# Patient Record
Sex: FEMALE | Race: WHITE | Employment: UNEMPLOYED | URBAN - NONMETROPOLITAN AREA
[De-identification: names, ages, dates, MRNs, and addresses within clinical notes are randomized per-mention and may not be internally consistent; named-entity substitution may affect disease eponyms.]

---

## 2019-11-06 ENCOUNTER — OFFICE VISIT (OUTPATIENT)
Dept: NEUROSURGERY | Age: 59
End: 2019-11-06
Payer: COMMERCIAL

## 2019-11-06 VITALS
DIASTOLIC BLOOD PRESSURE: 76 MMHG | HEART RATE: 84 BPM | HEIGHT: 65 IN | BODY MASS INDEX: 33.15 KG/M2 | SYSTOLIC BLOOD PRESSURE: 134 MMHG | OXYGEN SATURATION: 98 % | WEIGHT: 199 LBS

## 2019-11-06 DIAGNOSIS — R25.1 TREMOR: ICD-10-CM

## 2019-11-06 DIAGNOSIS — R25.1 TREMOR: Primary | ICD-10-CM

## 2019-11-06 LAB
T4 FREE: 0.3 NG/DL (ref 0.9–1.7)
TSH SERPL DL<=0.05 MIU/L-ACNC: 22.56 UIU/ML (ref 0.27–4.2)

## 2019-11-06 PROCEDURE — 99204 OFFICE O/P NEW MOD 45 MIN: CPT | Performed by: NURSE PRACTITIONER

## 2019-11-06 RX ORDER — GEMFIBROZIL 600 MG/1
600 TABLET, FILM COATED ORAL
COMMUNITY
End: 2021-03-11

## 2019-11-06 RX ORDER — INSULIN GLARGINE 100 [IU]/ML
111 INJECTION, SOLUTION SUBCUTANEOUS DAILY
Refills: 1 | COMMUNITY
Start: 2019-09-23 | End: 2022-10-18

## 2019-11-06 RX ORDER — VITAMIN E 268 MG
800 CAPSULE ORAL DAILY
COMMUNITY
End: 2022-10-18

## 2019-11-06 RX ORDER — PEN NEEDLE, DIABETIC 32GX 5/32"
NEEDLE, DISPOSABLE MISCELLANEOUS
Refills: 5 | COMMUNITY
Start: 2019-08-19 | End: 2022-10-18

## 2019-11-06 SDOH — HEALTH STABILITY: MENTAL HEALTH: HOW OFTEN DO YOU HAVE A DRINK CONTAINING ALCOHOL?: NEVER

## 2019-11-07 ENCOUNTER — TELEPHONE (OUTPATIENT)
Dept: NEUROSURGERY | Age: 59
End: 2019-11-07

## 2019-11-15 ENCOUNTER — TELEPHONE (OUTPATIENT)
Dept: NEUROSURGERY | Age: 59
End: 2019-11-15

## 2019-12-18 ENCOUNTER — TELEPHONE (OUTPATIENT)
Dept: NEUROSURGERY | Age: 59
End: 2019-12-18

## 2020-01-15 ENCOUNTER — TELEPHONE (OUTPATIENT)
Dept: NEUROLOGY | Age: 60
End: 2020-01-15

## 2020-01-15 NOTE — TELEPHONE ENCOUNTER
Patient called and was wanting to know the results of her MRI that was done at [facility]. The radiology report is in the media tab in the chart.

## 2020-01-16 NOTE — TELEPHONE ENCOUNTER
Called patient and left VM. Advised patient to call the office if she has any additional questions. Provided my extension number so the patient can reach me directly.

## 2020-03-11 ENCOUNTER — OFFICE VISIT (OUTPATIENT)
Dept: NEUROSURGERY | Age: 60
End: 2020-03-11
Payer: COMMERCIAL

## 2020-03-11 VITALS
HEART RATE: 100 BPM | BODY MASS INDEX: 33.66 KG/M2 | OXYGEN SATURATION: 97 % | HEIGHT: 65 IN | WEIGHT: 202 LBS | SYSTOLIC BLOOD PRESSURE: 128 MMHG | DIASTOLIC BLOOD PRESSURE: 73 MMHG

## 2020-03-11 PROCEDURE — 99213 OFFICE O/P EST LOW 20 MIN: CPT | Performed by: NURSE PRACTITIONER

## 2020-03-11 RX ORDER — PROPRANOLOL HYDROCHLORIDE 40 MG/1
40 TABLET ORAL 2 TIMES DAILY
Qty: 60 TABLET | Refills: 5 | Status: SHIPPED | OUTPATIENT
Start: 2020-03-11 | End: 2020-06-10 | Stop reason: SDUPTHER

## 2020-03-11 RX ORDER — PRAVASTATIN SODIUM 80 MG/1
TABLET ORAL
COMMUNITY
Start: 2020-03-04 | End: 2020-12-11

## 2020-03-11 NOTE — PATIENT INSTRUCTIONS
Propranolol instructions:     Week 1: Take 1 tablet in the morning   Week 2: If no side effects (sleepiness, sluggish) then increase to 1 tablet twice daily.

## 2020-03-11 NOTE — PROGRESS NOTES
REVIEW OF SYSTEMS    Constitutional: []Fever []Sweat []Chills [] Recent Injury [x] Denies all unless marked  HEENT:[x]Headache  [] Head Injury/Hearing Loss  [] Sore Throat  [] Ear Ache/Dizziness  [] Denies all unless marked  Spine:  [] Neck pain  [] Back pain  [] Sciaticia  [x] Denies all unless marked  Cardiovascular:[]Heart Disease []Chest Pain [] Palpitations  [x] Denies all unless marked  Pulmonary: []Shortness of Breath []Cough   [x] Denies all unless marke  Gastrointestinal: []Nausea  []Vomiting  []Abdominal Pain  []Constipation  []Diarrhea  []Dark Bloody Stools  [x] Denies all unless marked  Psychiatric/Behavioral:[x] Depression [] Anxiety [] Denies all unless marked  Genitourinary:   [] Frequency  [] Urgency  [] Incontinence [] Pain with Urination  [x] Denies all unless marked  Extremities: []Pain  []Swelling  [x] Denies all unless marked  Musculoskeletal: [] Muscle Pain  [] Joint Pain  [] Arthritis [] Muscle Cramps [] Muscle Twitches  [x] Denies all unless marked  Sleep: [] Insomnia [] Snoring [x] Restless Legs [] Sleep Apnea  [] Daytime Sleepiness  [] Denies all unless marked  Skin:[] Rash [] Skin Discoloration [x] Denies all unless marked   Neurological: []Visual Disturbance/Memory Loss [] Loss of Balance [] Slurred Speech/Weakness [] Seizures  [] Vertigo/Dizziness [x] Denies all unless marked

## 2020-03-11 NOTE — PROGRESS NOTES
repetition intact. COMMENTS:    Cranial Nerves [X]No VF deficit to confrontation,  no papilledema on fundoscopic exam.  [X]PERRLA, EOMI, no nystagmus, conjugate eye movements, no ptosis  [X]Face symmetric  [X]Facial sensation intact  [X]Tongue midline no atrophy or fasciculations present  [X]Palate midline, hearing to finger rub normal bilaterally  [X]Shoulder shrug and SCM testing normal bilaterally  COMMENTS:    Motor   [X]5/5 strength x 4 extremities  [X]Normal bulk and tone  [X]No tremor present  [X]No rigidity or bradykinesia noted  COMMENTS: mild intention tremor, bilaterally L>R   Sensory  [X]Sensation intact to light touch, pin prick, vibration, and proprioception BLE  [X]Sensation intact to light touch, pin prick, vibration, and proprioception BUE  COMMENTS:   Coordination [X]FTN normal bilaterally   [X]HTS normal bilaterally  [X]ALISSA normal bilaterally. COMMENTS:   Reflexes  [X]Symmetric and non-pathological  [X]Toes down going bilaterally  [X]No clonus present  COMMENTS:   Gait                  [X]Normal steady gait    [  ]Ataxic    [  ]Spastic     [  ]Magnetic     [  ]Shuffling  COMMENTS:        LABS RECORD AND IMAGING REVIEW (As below and per HPI)    No results found for: Pearley Tonny  No results found for: WBC, HGB, HCT, MCV, PLT  No results found for: NA, K, CL, CO2, BUN, CREATININE, GLUCOSE, CALCIUM, PROT, LABALBU, BILITOT, ALKPHOS, AST, ALT, LABGLOM, GFRAA, AGRATIO, GLOB  No results found for: CHOL, TRIG, HDL, LDLCALC  Lab Results   Component Value Date    TSH 22.560 (H) 11/06/2019    T4FREE 0.3 (L) 11/06/2019     No results found for: CRP, SEDRATE     Reviewed referral records     MRI brain (1/2020)- normal     ASSESSMENT:    Ha Carver is a 64y.o. year old female here for follow up of tremor. MRI brain was normal, TSH was quite elevated, she has followed up with PCP and started on medication now.  Exam is most c/w essential tremor, has noted some worsening since last visit, starting to interfere with ADLs. Will plan to try Propranolol, has taken this previously but at quite a low dose. Advised to monitor blood sugar and possible masking of hypoglycemia while taking this medication, her blood sugar is not labile, does not have hypoglycemic episodes. ICD-10-CM    1. Tremor R25.1        PLAN:  1. Propranolol 40mg BID. Discussed side effects with patient. Discussed closely monitoring blood sugar with this medication   2. Continue follow up with PCP regarding thyroid function   3. Return in about 3 months (around 6/11/2020) for follow up, sooner if worsening. ROB Severino    Note:  A total of >50% (>8 minutes) of 15 minutes was spent discussing the pathophysiology and treatment and/or coordination of care of the above diagnoses. This dictation was generated by voice recognition computer software. Although all attempts are made to edit the dictation for accuracy, there may be errors in the transcription that are not intended.

## 2020-06-08 ENCOUNTER — TELEPHONE (OUTPATIENT)
Dept: NEUROSURGERY | Age: 60
End: 2020-06-08

## 2020-06-10 ENCOUNTER — VIRTUAL VISIT (OUTPATIENT)
Dept: NEUROSURGERY | Age: 60
End: 2020-06-10
Payer: COMMERCIAL

## 2020-06-10 PROCEDURE — 99213 OFFICE O/P EST LOW 20 MIN: CPT | Performed by: NURSE PRACTITIONER

## 2020-06-10 RX ORDER — PROPRANOLOL HYDROCHLORIDE 40 MG/1
80 TABLET ORAL 2 TIMES DAILY
Qty: 120 TABLET | Refills: 5
Start: 2020-06-10 | End: 2020-09-09

## 2020-09-09 ENCOUNTER — TELEPHONE (OUTPATIENT)
Dept: NEUROSURGERY | Age: 60
End: 2020-09-09

## 2020-09-09 ENCOUNTER — VIRTUAL VISIT (OUTPATIENT)
Dept: NEUROSURGERY | Age: 60
End: 2020-09-09
Payer: MEDICAID

## 2020-09-09 PROCEDURE — 99213 OFFICE O/P EST LOW 20 MIN: CPT | Performed by: NURSE PRACTITIONER

## 2020-09-09 RX ORDER — PRIMIDONE 50 MG/1
50 TABLET ORAL NIGHTLY
Qty: 60 TABLET | Refills: 5 | Status: SHIPPED | OUTPATIENT
Start: 2020-09-09 | End: 2020-12-11 | Stop reason: SDUPTHER

## 2020-09-09 NOTE — PROGRESS NOTES
unless marked  Cardiovascular:[]? Heart Disease []? Chest Pain []? Palpitations  [x]? Denies all unless marked  Pulmonary: []? Shortness of Breath []? Cough   [x]? Denies all unless marke  Gastrointestinal: []? Nausea  []? Vomiting  []? Abdominal Pain  []? Constipation  []? Diarrhea  []? Dark Bloody Stools  [x]? Denies all unless marked  Psychiatric/Behavioral:[]? Depression []? Anxiety [x]? Denies all unless marked  Genitourinary:   []? Frequency  []? Urgency  []? Incontinence []? Pain with Urination  [x]? Denies all unless marked  Extremities: []? Pain  []? Swelling  [x]? Denies all unless marked  Musculoskeletal: []? Muscle Pain  []? Joint Pain  []? Arthritis []? Muscle Cramps []? Muscle Twitches  [x]? Denies all unless marked  Sleep: []? Insomnia []? Snoring []? Restless Legs []? Sleep Apnea  []? Daytime Sleepiness  [x]? Denies all unless marked  Skin:[]? Rash []? Skin Discoloration [x]? Denies all unless marked   Neurological: []? Visual Disturbance/Memory Loss []? Loss of Balance []? Slurred Speech/Weakness []? Seizures  []? Vertigo/Dizziness [x]? Denies all unless marked    The MA has completed the ROS with the patient. I have reviewed it in its' entirety with the patient and agree with the documentation. Past Medical History:   Diagnosis Date    Hypercholesteremia     Tremor        History reviewed. No pertinent surgical history. History reviewed. No pertinent family history. Social History     Socioeconomic History    Marital status:       Spouse name: Not on file    Number of children: Not on file    Years of education: Not on file    Highest education level: Not on file   Occupational History    Not on file   Social Needs    Financial resource strain: Not on file    Food insecurity     Worry: Not on file     Inability: Not on file    Transportation needs     Medical: Not on file     Non-medical: Not on file   Tobacco Use    Smoking status: Never Smoker    Smokeless tobacco: Never Used Substance and Sexual Activity    Alcohol use: Never     Frequency: Never    Drug use: Never    Sexual activity: Not on file   Lifestyle    Physical activity     Days per week: Not on file     Minutes per session: Not on file    Stress: Not on file   Relationships    Social connections     Talks on phone: Not on file     Gets together: Not on file     Attends [employer] service: Not on file     Active member of club or organization: Not on file     Attends meetings of clubs or organizations: Not on file     Relationship status: Not on file    Intimate partner violence     Fear of current or ex partner: Not on file     Emotionally abused: Not on file     Physically abused: Not on file     Forced sexual activity: Not on file   Other Topics Concern    Not on file   Social History Narrative    Not on file       Current Outpatient Medications   Medication Sig Dispense Refill    primidone (MYSOLINE) 50 MG tablet Take 1 tablet by mouth nightly 60 tablet 5    pravastatin (PRAVACHOL) 80 MG tablet TAKE 1 TABLET BY MOUTH ONCE DAILY AT BEDTIME      BASAGLAR KWIKPEN 100 UNIT/ML injection pen Inject 111 Units into the skin daily   1    BD PEN NEEDLE ESPERANZA U/F 32G X 4 MM MISC USE AS DIRECTED  5    gemfibrozil (LOPID) 600 MG tablet Take 600 mg by mouth 2 times daily (before meals)      metFORMIN (GLUCOPHAGE) 1000 MG tablet Take 1,000 mg by mouth 2 times daily (with meals)      vitamin E 400 UNIT capsule Take 800 Units by mouth daily       No current facility-administered medications for this visit.         No Known Allergies    PHYSICAL EXAMINATION:    Constitutional -   General appearance: No acute distress   EYES -   Conjunctiva normal  ENT-    No scars, masses, or lesions over external nose or ears  Cardiovascular -   No clubbing, cyanosis, or edema   Pulmonary-   Good expansion, normal effort without use of accessory muscles  Musculoskeletal -   No significant wasting of muscles noted  Gait as below, see gait exam in the neurologic exam  No gross bony deformities  Skin -   No rash, erythema, or pallor  Psychiatric -   Mood, affect, and behavior appear normal    Memory as below see mental status examination in the neurologic exam    NEUROLOGICAL EXAM    Mental status   [x]Awake, alert, oriented   [x]Affect attention and concentration appear appropriate  [x]Recent and remote memory appears unremarkable  [x]Speech normal without dysarthria or aphasia, comprehension and repetition intact. COMMENTS:    Cranial Nerves [x] PER, EOMI, no nystagmus, conjugate eye movements, no ptosis  [x]Face symmetric  [x]Tongue midline   [x]Shoulder shrug normal bilaterally  COMMENTS:   Motor   [x]Antigravity x 4 extremities  [x]Normal bulk and tone  []No tremor present  COMMENTS: postural/intention tremor bilateral hands L>R       Coordination [x] HTS normal bilaterally   COMMENTS:       Gait                  [x]Normal steady gait    []Ataxic    []Spastic     []Magnetic     []Shuffling  COMMENTS:       [] OTHER:      Due to this being a TeleHealth encounter, evaluation of the following organ systems is limited: Vitals/Constitutional/EENT/Resp/CV/GI//MS/Neuro/Skin/Heme-Lymph-Imm. LABS RECORD AND IMAGING REVIEW (As below and per HPI)    No results found for: Eddie García  No results found for: WBC, HGB, HCT, MCV, PLT  No results found for: NA, K, CL, CO2, BUN, CREATININE, GLUCOSE, CALCIUM, PROT, LABALBU, BILITOT, ALKPHOS, AST, ALT, LABGLOM, GFRAA, AGRATIO, GLOB    Reviewed referral records      MRI brain (1/2020)- normal     ASSESSMENT:    Yoly Wiggins is a 64y.o. year old female evaluated via Telehealth encounter for follow up of tremors. MRI brain negative. Previous TSH was quite elevated but now on Synthroid and level has normalized per patient, PCP following this. Exam is unchanged, most consistent with essential tremor.  Has tried Propranolol without much improvement, further titration not beneficial. Will plan to switch to Primidone today. Noting more heel pain recently, question heel spur, she has an appt with her primary tomorrow regarding this. Diagnosis Orders   1. Tremor          PLAN:  1. Trial of Primidone 50mg nightly. Titrate further as needed. Discussed side effects with patient. 2. Continue follow up with PCP regarding thyroid function and right heel/hip pain   3. Follow up in 3 months, sooner with any worsening     An  electronic signature was used to authenticate this note. ROB Goodwin     Pursuant to the emergency declaration under the [facility] waiver authority and the [facility] and [employer] Act, this Virtual  Visit was conducted, with patient's consent, to reduce the patient's risk of exposure to COVID-19 and provide continuity of care for an established patient. Services were provided through a video synchronous discussion virtually to substitute for in-person clinic visit.

## 2020-09-09 NOTE — TELEPHONE ENCOUNTER
[place] to r/s appointment due to provider being out of office, patient came in office and scheduled new appointment

## 2020-12-11 ENCOUNTER — OFFICE VISIT (OUTPATIENT)
Dept: NEUROSURGERY | Age: 60
End: 2020-12-11
Payer: MEDICAID

## 2020-12-11 VITALS
OXYGEN SATURATION: 98 % | HEIGHT: 65 IN | DIASTOLIC BLOOD PRESSURE: 79 MMHG | SYSTOLIC BLOOD PRESSURE: 133 MMHG | WEIGHT: 197 LBS | HEART RATE: 88 BPM | TEMPERATURE: 98.9 F | BODY MASS INDEX: 32.82 KG/M2

## 2020-12-11 PROCEDURE — 99213 OFFICE O/P EST LOW 20 MIN: CPT | Performed by: NURSE PRACTITIONER

## 2020-12-11 RX ORDER — LEVOTHYROXINE SODIUM 137 UG/1
125 TABLET ORAL DAILY
COMMUNITY
Start: 2020-12-04 | End: 2021-03-11

## 2020-12-11 RX ORDER — PRIMIDONE 50 MG/1
100 TABLET ORAL NIGHTLY
Qty: 60 TABLET | Refills: 5 | Status: SHIPPED | OUTPATIENT
Start: 2020-12-11 | End: 2021-03-11 | Stop reason: SDUPTHER

## 2020-12-11 RX ORDER — HUMAN INSULIN 100 [IU]/ML
INJECTION, SOLUTION SUBCUTANEOUS
COMMUNITY
Start: 2020-12-10 | End: 2021-03-11

## 2020-12-11 NOTE — PROGRESS NOTES
[facility] Neurology Office Note      Patient:   Irais Driver  MR#:    [id number]  Account Number:                         YOB: 1960  Date of Evaluation:  12/11/2020  Time of Note:                          8:26 AM  Primary/Referring Physician:  ROB Fleming - [facility]   Consulting Physician:  ROB Mancilla    FOLLOW UP VISIT    Chief Complaint   Patient presents with    3 Month Follow-Up    Tremors     pt states tremors are about the same        HISTORY OF PRESENT ILLNESS    Irais Driver is a 64y.o. year old female here for follow up of bilateral hand tremor, L>R. Intention and postural component noted. Noting head titubation as well. No vocal changes. No clear resting tremor. Denies gait changes. Denies rigidity or bradykinesia. Tremor not interfering with ADLs. She has tried Propranolol but no improvement. No tremor inducing medication. No stroke history. There is a family history of Parkinson's. No clear family history of essential tremor. No other complaints. Past Medical History:   Diagnosis Date    Hypercholesteremia     Tremor      History reviewed. No pertinent surgical history. History reviewed. No pertinent family history. Social History     Socioeconomic History    Marital status:       Spouse name: Not on file    Number of children: Not on file    Years of education: Not on file    Highest education level: Not on file   Occupational History    Not on file   Social Needs    Financial resource strain: Not on file    Food insecurity     Worry: Not on file     Inability: Not on file    Transportation needs     Medical: Not on file     Non-medical: Not on file   Tobacco Use    Smoking status: Never Smoker    Smokeless tobacco: Never Used   Substance and Sexual Activity    Alcohol use: Never     Frequency: Never    Drug use: Never    Sexual activity: Not on file   Lifestyle    Physical activity     Days per week: Not on file     Minutes per session: Not on file    Stress: Not on file   Relationships    Social connections     Talks on phone: Not on file     Gets together: Not on file     Attends [employer]ist service: Not on file     Active member of club or organization: Not on file     Attends meetings of clubs or organizations: Not on file     Relationship status: Not on file    Intimate partner violence     Fear of current or ex partner: Not on file     Emotionally abused: Not on file     Physically abused: Not on file     Forced sexual activity: Not on file   Other Topics Concern    Not on file   Social History Narrative    Not on file       Current Outpatient Medications   Medication Sig Dispense Refill    levothyroxine (SYNTHROID) 137 MCG tablet Take 125 mcg by mouth daily      NOVOLIN R FLEXPEN 100 UNIT/ML SOPN       primidone (MYSOLINE) 50 MG tablet Take 2 tablets by mouth nightly 60 tablet 5    BASAGLAR KWIKPEN 100 UNIT/ML injection pen Inject 111 Units into the skin daily   1    BD PEN NEEDLE ESPERANZA U/F 32G X 4 MM MISC USE AS DIRECTED  5    gemfibrozil (LOPID) 600 MG tablet Take 600 mg by mouth 2 times daily (before meals)      vitamin E 400 UNIT capsule Take 800 Units by mouth daily       No current facility-administered medications for this visit. No Known Allergies    REVIEW OF SYSTEMS  Constitutional: []? Fever []? Sweats []? Chills []? Recent Injury [x]? Denies all unless marked  HEENT:[]? Headache  []? Head Injury []? Hearing Loss  []? Sore Throat  []? Ear Ache [x]? Denies all unless marked  Spine:  []? Neck pain  []? Back pain  []? Sciaticia  [x]? Denies all unless marked  Cardiovascular:[]? Heart Disease []? Palpitations []? Chest Pain   [x]? Denies all unless marked  Pulmonary: []? Shortness of Breath []? Cough   [x]? Denies all unless marked  Psychiatric/Behavioral:[]? Depression []? Anxiety [x]? Denies all unless marked  Gastrointestinal: []? Nausea  []? Vomiting  []? Abdominal Pain  []? Constipation  []? Diarrhea  [x]?  Denies all unless intact  [X]Tongue midline no atrophy or fasciculations present  [X]Palate midline, hearing to finger rub normal bilaterally  [X]Shoulder shrug and SCM testing normal bilaterally  COMMENTS:    Motor   [X]5/5 strength x 4 extremities  [X]Normal bulk and tone  [X]No tremor present  [X]No rigidity or bradykinesia noted  COMMENTS: mild intention tremor, bilaterally L>R   Sensory  [X]Sensation intact to light touch, pin prick, vibration, and proprioception BLE  [X]Sensation intact to light touch, pin prick, vibration, and proprioception BUE  COMMENTS:   Coordination [X]FTN normal bilaterally   [X]HTS normal bilaterally  [X]ALISSA normal bilaterally. COMMENTS:   Reflexes  [X]Symmetric and non-pathological  [X]Toes down going bilaterally  [X]No clonus present  COMMENTS:   Gait                  [X]Normal steady gait    [  ]Ataxic    [  ]Spastic     [  ]Magnetic     [  ]Shuffling  COMMENTS:        LABS RECORD AND IMAGING REVIEW (As below and per HPI)    No results found for: Elijosh Fritz  No results found for: WBC, HGB, HCT, MCV, PLT  No results found for: NA, K, CL, CO2, BUN, CREATININE, GLUCOSE, CALCIUM, PROT, LABALBU, BILITOT, ALKPHOS, AST, ALT, LABGLOM, GFRAA, AGRATIO, GLOB  No results found for: CHOL, TRIG, HDL, LDLCALC  Lab Results   Component Value Date    TSH 22.560 (H) 11/06/2019    T4FREE 0.3 (L) 11/06/2019     No results found for: CRP, SEDRATE     Reviewed referral records     MRI brain (1/2020)- normal     ASSESSMENT:    Cristian Gallagher is a 64y.o. year old female here for follow up of tremors. MRI brain negative. Previous thyroid testing abnormal but now on Synthroid, normalized per patient. Exam is unchanged, most c/w essential tremor. Has tried Propranolol without much improvement. Primidone has been beneficial, will titrate slightly further today. ICD-10-CM    1. Tremor  R25.1        PLAN:  1. Increase Primidone to 50mg BID. Titrate further as needed. Discussed side effects with patient.     2. Continue follow up with PCP regarding thyroid function   3. Return in about 6 months (around 6/11/2021) for follow up, sooner if worsening. Jarrett Carroll DNP, APRN    Note:  A total of >50% (>8 minutes) of 15 minutes was spent discussing the pathophysiology and treatment and/or coordination of care of the above diagnoses.

## 2021-03-11 ENCOUNTER — VIRTUAL VISIT (OUTPATIENT)
Dept: NEUROSURGERY | Age: 61
End: 2021-03-11
Payer: MEDICAID

## 2021-03-11 DIAGNOSIS — R25.1 TREMOR: Primary | ICD-10-CM

## 2021-03-11 PROCEDURE — 99213 OFFICE O/P EST LOW 20 MIN: CPT | Performed by: NURSE PRACTITIONER

## 2021-03-11 RX ORDER — PRIMIDONE 50 MG/1
TABLET ORAL
Qty: 90 TABLET | Refills: 5 | Status: SHIPPED | OUTPATIENT
Start: 2021-03-11 | End: 2021-06-11 | Stop reason: SDUPTHER

## 2021-03-11 NOTE — PROGRESS NOTES
[facility] Virtual Visit Note    3/11/2021    TELEHEALTH EVALUATION -- Audio/Visual (During GTYUF-42 public health emergency)      Patient:   Eloise Benjamin  MR#:    [id number]  Account Number:                         YOB: 1960  Date of Evaluation:  3/11/2021  Time of Note:                          9:32 AM  Primary/Referring Physician:  ROB Polo - NP   Consulting Physician:  ROB Momin     FOLLOW UP    Chief Complaint   Patient presents with    6 Month Follow-Up     c/o worsening tremors    Tremors    Medication Adjustment       HPI:    Eloise Benjamin (:  1960) has requested an audio/video evaluation for the following concern(s):    For sooner follow up. Has noted worsening tremor recently. Still noting bilateral hand tremor L>R. Intention and postural component noted. Noting head titubation as well. No vocal changes. No clear resting tremor. Denies gait changes. Denies rigidity or bradykinesia. Tremor is interfering with ADLs. She is on Primidone 100mg nightly. She has tried Propranolol but no improvement. No tremor inducing medications. No stroke history. There is a family history of Parkinson's. No clear family history of essential tremor. REVIEW OF SYSTEMS    Constitutional: []? Fever []? Sweats []? Chills []? Recent Injury [x]? Denies all unless marked  HEENT:[]? Headache  []? Head Injury []? Hearing Loss  []? Sore Throat  []? Ear Ache [x]? Denies all unless marked  Spine:  []? Neck pain  []? Back pain  []? Sciaticia  [x]? Denies all unless marked  Cardiovascular:[]? Heart Disease []? Palpitations []? Chest Pain   [x]? Denies all unless marked  Pulmonary: []? Shortness of Breath []? Cough   [x]? Denies all unless marked  Psychiatric/Behavioral:[]? Depression []? Anxiety [x]? Denies all unless marked  Gastrointestinal: []? Nausea  []? Vomiting  []? Abdominal Pain  []? Constipation  []? Diarrhea  [x]? Denies all unless marked  Genitourinary:   []? Frequency  []? Urgency  []? Dysuria []? Incontinence  [x]? Denies all unless marked  Extremities: []? Pain  []? Swelling  [x]? Denies all unless marked  Musculoskeletal: []? Myalgias  []? Joint Pain  []? Arthritis []? Muscle Cramps []? Muscle Twitches  [x]? Denies all unless marked  Sleep: []? Insomnia[]? Snoring []? Restless Legs  []? Sleep Apnea  []? Daytime Sleepiness  [x]? Denies all unless marked  Skin:[]? Rash []? Color Change [x]? Denies all unless marked   Neurological:[]? Visual Disturbance []? Memory Loss []? Loss of Balance []? Slurred Speech []? Weakness []? Seizures  []? Dizziness [x]? Denies all unless marked    Past Medical History:   Diagnosis Date    Hypercholesteremia     Tremor        History reviewed. No pertinent surgical history. History reviewed. No pertinent family history. Social History     Socioeconomic History    Marital status:       Spouse name: Not on file    Number of children: Not on file    Years of education: Not on file    Highest education level: Not on file   Occupational History    Not on file   Social Needs    Financial resource strain: Not on file    Food insecurity     Worry: Not on file     Inability: Not on file    Transportation needs     Medical: Not on file     Non-medical: Not on file   Tobacco Use    Smoking status: Never Smoker    Smokeless tobacco: Never Used   Substance and Sexual Activity    Alcohol use: Never     Frequency: Never    Drug use: Never    Sexual activity: Not on file   Lifestyle    Physical activity     Days per week: Not on file     Minutes per session: Not on file    Stress: Not on file   Relationships    Social connections     Talks on phone: Not on file     Gets together: Not on file     Attends Cheondoism service: Not on file     Active member of club or organization: Not on file     Attends meetings of clubs or organizations: Not on file     Relationship status: Not on file    Intimate partner violence     Fear of current or ex partner: Not on file     Emotionally abused: Not on file     Physically abused: Not on file     Forced sexual activity: Not on file   Other Topics Concern    Not on file   Social History Narrative    Not on file       Current Outpatient Medications   Medication Sig Dispense Refill    primidone (MYSOLINE) 50 MG tablet Take 2 tablets by mouth nightly 60 tablet 5    BASAGLAR KWIKPEN 100 UNIT/ML injection pen Inject 111 Units into the skin daily   1    BD PEN NEEDLE ESPERANZA U/F 32G X 4 MM MISC USE AS DIRECTED  5    vitamin E 400 UNIT capsule Take 800 Units by mouth daily       No current facility-administered medications for this visit. No Known Allergies    PHYSICAL EXAMINATION:    Constitutional    General appearance: No acute distress   EYES -   Conjunctiva normal  ENT-    No scars, masses, or lesions over external nose or ears  Cardiovascular -   No clubbing, cyanosis, or edema   Pulmonary-   Good expansion, normal effort without use of accessory muscles  Musculoskeletal    No significant wasting of muscles noted  Gait as below, see gait exam in the neurologic exam  No gross bony deformities  Skin    No rash, erythema, or pallor  Psychiatric    Mood, affect, and behavior appear normal    Memory as below see mental status examination in the neurologic exam    NEUROLOGICAL EXAM    Mental status   [x]Awake, alert, oriented   [x]Affect attention and concentration appear appropriate  [x]Recent and remote memory appears unremarkable  [x]Speech normal without dysarthria or aphasia, comprehension and repetition intact.    COMMENTS:    Cranial Nerves [x] PER, EOMI, no nystagmus, conjugate eye movements, no ptosis  [x]Face symmetric  [x]Tongue midline   [x]Shoulder shrug normal bilaterally  COMMENTS:   Motor   [x]Antigravity x 4 extremities  [x]Normal bulk and tone  [x]No tremor present  COMMENTS: mild intention tremor, bilaterally L>R       Coordination [x] HTS normal bilaterally   COMMENTS:       Gait Patient was located at there primary place of residence (home). Provider was located at [facility] in [facility], [address]. No one else was present during the video visit.

## 2021-06-11 ENCOUNTER — OFFICE VISIT (OUTPATIENT)
Dept: NEUROSURGERY | Age: 61
End: 2021-06-11
Payer: MEDICAID

## 2021-06-11 VITALS
WEIGHT: 188 LBS | HEART RATE: 69 BPM | DIASTOLIC BLOOD PRESSURE: 72 MMHG | BODY MASS INDEX: 29.51 KG/M2 | HEIGHT: 67 IN | SYSTOLIC BLOOD PRESSURE: 122 MMHG | OXYGEN SATURATION: 98 %

## 2021-06-11 DIAGNOSIS — G25.0 ESSENTIAL TREMOR: Primary | ICD-10-CM

## 2021-06-11 PROCEDURE — 99213 OFFICE O/P EST LOW 20 MIN: CPT | Performed by: NURSE PRACTITIONER

## 2021-06-11 RX ORDER — GEMFIBROZIL 600 MG/1
1 TABLET, FILM COATED ORAL 2 TIMES DAILY
COMMUNITY
Start: 2021-04-18

## 2021-06-11 RX ORDER — PRIMIDONE 50 MG/1
TABLET ORAL
Qty: 150 TABLET | Refills: 5 | Status: SHIPPED | OUTPATIENT
Start: 2021-06-11 | End: 2021-09-14 | Stop reason: SDUPTHER

## 2021-06-11 RX ORDER — LEVOTHYROXINE SODIUM 0.15 MG/1
1 TABLET ORAL DAILY
COMMUNITY
Start: 2021-04-18 | End: 2022-10-18

## 2021-06-11 NOTE — PROGRESS NOTES
Insecurity:     Worried About Running Out of Food in the Last Year:     [address] in the Last Year:    Transportation Needs:     Lack of Transportation (Medical):  Lack of Transportation (Non-Medical):    Physical Activity:     Days of Exercise per Week:     Minutes of Exercise per Session:    Stress:     Feeling of Stress :    Social Connections:     Frequency of Communication with Friends and Family:     Frequency of Social Gatherings with Friends and Family:     Attends Muslim Services:     Active Member of Clubs or Organizations:     Attends Club or Organization Meetings:     Marital Status:    Intimate Partner Violence:     Fear of Current or Ex-Partner:     Emotionally Abused:     Physically Abused:     Sexually Abused:        Current Outpatient Medications   Medication Sig Dispense Refill    levothyroxine (SYNTHROID) 150 MCG tablet Take 1 tablet by mouth daily      gemfibrozil (LOPID) 600 MG tablet Take 1 tablet by mouth 2 times daily      primidone (MYSOLINE) 50 MG tablet Take 2 tablets in the morning and 3 tablets at night. 150 tablet 5    BASAGLAR KWIKPEN 100 UNIT/ML injection pen Inject 111 Units into the skin daily   1    BD PEN NEEDLE ESPERANZA U/F 32G X 4 MM MISC USE AS DIRECTED  5    vitamin E 400 UNIT capsule Take 800 Units by mouth daily       No current facility-administered medications for this visit. No Known Allergies      REVIEW OF SYSTEMS  Constitutional: []? Fever []? Sweat []? Chills []? Recent Injury [x]? Denies all unless marked  HEENT:[]? Headache  []? Head Injury/Hearing Loss  []? Sore Throat  []? Ear Ache/Dizziness  [x]? Denies all unless marked  Spine:  []? Neck pain  []? Back pain  []? Sciaticia  [x]? Denies all unless marked  Cardiovascular:[]? Heart Disease []? Chest Pain []? Palpitations  [x]? Denies all unless marked  Pulmonary: []? Shortness of Breath []? Cough   [x]? Denies all unless marke  Gastrointestinal: []? Nausea  []? Vomiting  []? Abdominal Pain []?Constipation  []? Diarrhea  []? Dark Bloody Stools  [x]? Denies all unless marked  Psychiatric/Behavioral:[]? Depression []? Anxiety [x]? Denies all unless marked  Genitourinary:   []? Frequency  []? Urgency  []? Incontinence []? Pain with Urination  [x]? Denies all unless marked  Extremities: []? Pain  []? Swelling  [x]? Denies all unless marked  Musculoskeletal: []? Muscle Pain  []? Joint Pain  []? Arthritis []? Muscle Cramps []? Muscle Twitches  [x]? Denies all unless marked  Sleep: []? Insomnia []? Snoring []? Restless Legs []? Sleep Apnea  []? Daytime Sleepiness  [x]? Denies all unless marked  Skin:[]? Rash []? Skin Discoloration [x]? Denies all unless marked   Neurological: [x]? Visual Disturbance/Memory Loss []? Loss of Balance []? Slurred Speech/Weakness []? Seizures  [x]? Vertigo/Dizziness [x]? Denies all unless marked    The MA has completed the ROS with the patient. I have reviewed it in its' entirety with the patient and agree with the documentation. PHYSICAL EXAM  /72   Pulse 69   Ht 5' 7\" (1.702 m)   Wt 188 lb (85.3 kg)   SpO2 98%   BMI 29.44 kg/m²       Constitutional - No acute distress    HEENT- Conjunctiva normal.  No scars, masses, or lesions over external nose or ears, no neck masses noted, no jugular vein distension, no bruit  Cardiac- Regular rate and rhythm  Pulmonary- Good expansion, normal effort without use of accessory muscles  Musculoskeletal - No significant wasting of muscles noted, no bony deformities  Extremities - No clubbing, cyanosis or edema  Skin - Warm, dry, and intact. No rash, erythema, or pallor  Psychiatric - Mood, affect, and behavior appear normal      NEUROLOGICAL EXAM     Mental status   [x] Awake, alert, oriented   [x]Affect attention and concentration appear appropriate  [x]Recent and remote memory appears unremarkable  [x]Speech normal without dysarthria or aphasia, comprehension and repetition intact.    COMMENTS:    Cranial Nerves [x]No VF deficit to confrontation,  no papilledema on fundoscopic exam.  [x]PERRLA, EOMI, no nystagmus, conjugate eye movements, no ptosis  [x]Face symmetric  [x]Facial sensation intact  [x]Tongue midline no atrophy or fasciculations present  [x]Palate midline, hearing to finger rub normal bilaterally  [x]Shoulder shrug and SCM testing normal bilaterally  COMMENTS: head titubation; vocal tremor   Motor   [x]5/5 strength x 4 extremities  [x]Normal bulk and tone  []No tremor present  [x]No rigidity or bradykinesia noted  COMMENTS: bilateral postural tremor L>R   Sensory  [x]Sensation intact to light touch, pin prick, vibration, and proprioception BLE  [x]Sensation intact to light touch, pin prick, vibration, and proprioception BUE  COMMENTS:   Coordination [x]FTN normal bilaterally   [x]HTS normal bilaterally  [x]ALISSA normal bilaterally. COMMENTS:   Reflexes  [x]Symmetric and non-pathological  [x]Toes down going bilaterally  [x]No clonus present  COMMENTS:   Gait                  [x]Normal steady gait    []Ataxic    []Spastic     []Magnetic     []Shuffling  COMMENTS:       LABS RECORD AND IMAGING REVIEW (As below and per HPI)    No results found for: [id number]  No results found for: WBC, HGB, HCT, MCV, PLT  No results found for: NA, K, CL, CO2, BUN, CREATININE, GLUCOSE, CALCIUM, PROT, LABALBU, BILITOT, ALKPHOS, AST, ALT, LABGLOM, GFRAA, AGRATIO, GLOB  No results found for: CHOL, TRIG, HDL, LDLCALC  Lab Results   Component Value Date    TSH 22.560 (H) 11/06/2019    T4FREE 0.3 (L) 11/06/2019     No results found for: CRP, SEDRATE     MRI brain (1/2020)- normal     Reviewed referral records     ASSESSMENT:    Jaquan Mckenna is a 63y.o. year old female here for follow up of tremors. Exam is most consistent with essential tremor. Prior MRI brain negative. Prior thyroid testing abnormal but now on Synthroid. Given worsening of tremor will further titrate Primidone today. Has tried and failed Propranolol previously.  Could consider referral to movement clinic for additional treatment options as well. ICD-10-CM    1. Essential tremor  G25.0        PLAN:  1. Titrate Primidone to 100/150. Discussed side effects, titrate further as needed/tolerated   2. Avoid caffeine, manage stress/anxiety in the setting of essential tremor   3. Return in about 3 months (around 9/11/2021) for follow up, sooner if worsening.     Norm Cochran DNP, APRN

## 2021-06-11 NOTE — PROGRESS NOTES
REVIEW OF SYSTEMS    Constitutional: []Fever []Sweat []Chills [] Recent Injury [x] Denies all unless marked  HEENT:[]Headache  [] Head Injury/Hearing Loss  [] Sore Throat  [] Ear Ache/Dizziness  [x] Denies all unless marked  Spine:  [] Neck pain  [] Back pain  [] Sciaticia  [x] Denies all unless marked  Cardiovascular:[]Heart Disease []Chest Pain [] Palpitations  [x] Denies all unless marked  Pulmonary: []Shortness of Breath []Cough   [x] Denies all unless marke  Gastrointestinal: []Nausea  []Vomiting  []Abdominal Pain  []Constipation  []Diarrhea  []Dark Bloody Stools  [x] Denies all unless marked  Psychiatric/Behavioral:[] Depression [] Anxiety [x] Denies all unless marked  Genitourinary:   [] Frequency  [] Urgency  [] Incontinence [] Pain with Urination  [x] Denies all unless marked  Extremities: []Pain  []Swelling  [x] Denies all unless marked  Musculoskeletal: [] Muscle Pain  [] Joint Pain  [] Arthritis [] Muscle Cramps [] Muscle Twitches  [x] Denies all unless marked  Sleep: [] Insomnia [] Snoring [] Restless Legs [] Sleep Apnea  [] Daytime Sleepiness  [x] Denies all unless marked  Skin:[] Rash [] Skin Discoloration [x] Denies all unless marked   Neurological: [x]Visual Disturbance/Memory Loss [] Loss of Balance [] Slurred Speech/Weakness [] Seizures  [x] Vertigo/Dizziness [x] Denies all unless marked

## 2021-06-16 NOTE — PROGRESS NOTES
Twitches  [x]? Denies all unless marked  Sleep: []? Insomnia[]? Snoring []? Restless Legs  []? Sleep Apnea  []? Daytime Sleepiness  [x]? Denies all unless marked  Neurological:[]? Visual Disturbance/Memory Loss []? Loss of Balance []? Slurred Speech/Weakness []? Seizures  []? Vertigo/Dizziness [x]? Denies all unless marked    The MA has completed the ROS with the patient. I have reviewed it in its' entirety with the patient and agree with the documentation. Past Medical History:   Diagnosis Date    Hypercholesteremia     Tremor        History reviewed. No pertinent surgical history. History reviewed. No pertinent family history. Social History     Socioeconomic History    Marital status:       Spouse name: Not on file    Number of children: Not on file    Years of education: Not on file    Highest education level: Not on file   Occupational History    Not on file   Social Needs    Financial resource strain: Not on file    Food insecurity     Worry: Not on file     Inability: Not on file    Transportation needs     Medical: Not on file     Non-medical: Not on file   Tobacco Use    Smoking status: Never Smoker    Smokeless tobacco: Never Used   Substance and Sexual Activity    Alcohol use: Never     Frequency: Never    Drug use: Never    Sexual activity: Not on file   Lifestyle    Physical activity     Days per week: Not on file     Minutes per session: Not on file    Stress: Not on file   Relationships    Social connections     Talks on phone: Not on file     Gets together: Not on file     Attends Latter-day service: Not on file     Active member of club or organization: Not on file     Attends meetings of clubs or organizations: Not on file     Relationship status: Not on file    Intimate partner violence     Fear of current or ex partner: Not on file     Emotionally abused: Not on file     Physically abused: Not on file     Forced sexual activity: Not on file   Other Topics Concern    Not emergency declaration under the [facility] waiver authority and the [employer] Act, this Virtual  Visit was conducted, with patient's consent, to reduce the patient's risk of exposure to COVID-19 and provide continuity of care for an established patient. Services were provided through a video synchronous discussion virtually to substitute for in-person clinic visit. Family

## 2021-09-14 ENCOUNTER — TELEPHONE (OUTPATIENT)
Dept: NEUROSURGERY | Age: 61
End: 2021-09-14

## 2021-09-14 ENCOUNTER — VIRTUAL VISIT (OUTPATIENT)
Dept: NEUROSURGERY | Age: 61
End: 2021-09-14
Payer: MEDICAID

## 2021-09-14 DIAGNOSIS — G25.0 ESSENTIAL TREMOR: Primary | ICD-10-CM

## 2021-09-14 PROCEDURE — 99213 OFFICE O/P EST LOW 20 MIN: CPT | Performed by: NURSE PRACTITIONER

## 2021-09-14 RX ORDER — INSULIN LISPRO 100 [IU]/ML
INJECTION, SOLUTION INTRAVENOUS; SUBCUTANEOUS
COMMUNITY
Start: 2021-08-25

## 2021-09-14 RX ORDER — PRIMIDONE 50 MG/1
TABLET ORAL
Qty: 180 TABLET | Refills: 5 | Status: SHIPPED | OUTPATIENT
Start: 2021-09-14 | End: 2021-09-23 | Stop reason: SDUPTHER

## 2021-09-14 RX ORDER — FLUTICASONE PROPIONATE 50 MCG
SPRAY, SUSPENSION (ML) NASAL
COMMUNITY
Start: 2021-08-25 | End: 2022-10-18

## 2021-09-14 RX ORDER — AZELASTINE 1 MG/ML
SPRAY, METERED NASAL
COMMUNITY
Start: 2021-09-13 | End: 2022-10-18

## 2021-09-14 RX ORDER — CETIRIZINE HYDROCHLORIDE 10 MG/1
TABLET ORAL
COMMUNITY
Start: 2021-09-13 | End: 2022-10-18

## 2021-09-14 NOTE — PROGRESS NOTES
[facility] Neurology Virtual Visit Note    2021    TELEHEALTH EVALUATION -- Audio/Visual (During OOKST-28 public health emergency)      Patient:   Carol Montalvo  MR#:    [id number]  Account Number:                         YOB: 1960  Date of Evaluation:  2021  Time of Note:                          10:21 AM  Primary/Referring Physician:  ROB Osorio - NP   Consulting Physician:  Humphrey Amador DNP, APRN    FOLLOW UP    Chief Complaint   Patient presents with    3 Month Follow-Up     pt states tremor is getting worse. head is shaking much worse    Tremors    Discuss Medications     HPI:    Carol Montalvo (:  1960) has requested an audio/video evaluation for the following concern(s): For follow up of tremor. Has noted worsening tremor since last visit. Notes more head titubation. Feels like hand tremor has worsened as well, right hand more so than left. Worsens with intention/posture. Vocal tremor noted as well. Tremor is interfering with ADLs. On Primidone 100/150, no side effects. No clear resting tremor. Denies gait changes. Denies rigidity or bradykinesia. She has failed Propranolol previously. No tremor inducing medications. No stroke history. There is a family history of Parkinson's. No clear family history of essential tremor. REVIEW OF SYSTEMS     Constitutional: []? Fever []? Sweat []? Chills []? Recent Injury [x]? Denies all unless marked  HEENT:[]? Headache  []? Head Injury/Hearing Loss  []? Sore Throat  []? Ear Ache/Dizziness  [x]? Denies all unless marked  Spine:  []? Neck pain  []? Back pain  []? Sciaticia  [x]? Denies all unless marked  Cardiovascular:[]? Heart Disease []? Chest Pain []? Palpitations  [x]? Denies all unless marked  Pulmonary: []? Shortness of Breath []? Cough   [x]? Denies all unless marke  Gastrointestinal: []? Nausea  []? Vomiting  []? Abdominal Pain  []? Constipation  []? Diarrhea  []? Dark Bloody Stools  [x]?  Denies all unless marked  Psychiatric/Behavioral:[]? Depression []? Anxiety [x]? Denies all unless marked  Genitourinary:   []? Frequency  []? Urgency  []? Incontinence []? Pain with Urination  [x]? Denies all unless marked  Extremities: []? Pain  []? Swelling  [x]? Denies all unless marked  Musculoskeletal: []? Muscle Pain  []? Joint Pain  []? Arthritis []? Muscle Cramps []? Muscle Twitches  [x]? Denies all unless marked  Sleep: []? Insomnia []? Snoring []? Restless Legs []? Sleep Apnea  []? Daytime Sleepiness  [x]? Denies all unless marked  Skin:[]? Rash []? Skin Discoloration [x]? Denies all unless marked   Neurological: [x]? Visual Disturbance/Memory Loss []? Loss of Balance []? Slurred Speech/Weakness []? Seizures  [x]? Vertigo/Dizziness [x]? Denies all unless marked    The MA has completed the ROS with the patient. I have reviewed it in its' entirety with the patient and agree with the documentation. Past Medical History:   Diagnosis Date    Acquired cataract     Hypercholesteremia     Tremor        No past surgical history on file. No family history on file. Social History     Socioeconomic History    Marital status:      Spouse name: Not on file    Number of children: Not on file    Years of education: Not on file    Highest education level: Not on file   Occupational History    Not on file   Tobacco Use    Smoking status: Never Smoker    Smokeless tobacco: Never Used   Vaping Use    Vaping Use: Never used   Substance and Sexual Activity    Alcohol use: Never    Drug use: Never    Sexual activity: Not Currently   Other Topics Concern    Not on file   Social History Narrative    Not on file     Social Determinants of Health     Financial Resource Strain:     Difficulty of Paying Living Expenses:    Food Insecurity:     Worried About Running Out of Food in the Last Year:     [address] in the Last Year:    Transportation Needs:     Lack of Transportation (Medical):      Lack of Transportation (Non-Medical):    Physical Activity:     Days of Exercise per Week:     Minutes of Exercise per Session:    Stress:     Feeling of Stress :    Social Connections:     Frequency of Communication with Friends and Family:     Frequency of Social Gatherings with Friends and Family:     Attends [employer] Services:     Active Member of Clubs or Organizations:     Attends Club or Organization Meetings:     Marital Status:    Intimate Partner Violence:     Fear of Current or Ex-Partner:     Emotionally Abused:     Physically Abused:     Sexually Abused:        Current Outpatient Medications   Medication Sig Dispense Refill    fluticasone (FLONASE) 50 MCG/ACT nasal spray instill ONE SPRAY into EACH nostril TWICE DAILY      HUMALOG KWIKPEN 100 UNIT/ML SOPN INJECT 20 UNITS SUBCUTANEOUSLY THREE TIMES DAILY WITH MEALS      azelastine (ASTELIN) 0.1 % nasal spray       cetirizine (ZYRTEC) 10 MG tablet       primidone (MYSOLINE) 50 MG tablet Take 3 tablets in the morning and 4 tablets at night. 180 tablet 5    levothyroxine (SYNTHROID) 150 MCG tablet Take 1 tablet by mouth daily      gemfibrozil (LOPID) 600 MG tablet Take 1 tablet by mouth 2 times daily      BASAGLAR KWIKPEN 100 UNIT/ML injection pen Inject 111 Units into the skin daily   1    BD PEN NEEDLE ESPERANZA U/F 32G X 4 MM MISC USE AS DIRECTED  5    vitamin E 400 UNIT capsule Take 800 Units by mouth daily       No current facility-administered medications for this visit.        No Known Allergies    PHYSICAL EXAMINATION:    Constitutional    General appearance: No acute distress   EYES -   Conjunctiva normal  ENT-    No scars, masses, or lesions over external nose or ears  Cardiovascular -   No clubbing, cyanosis, or edema   Pulmonary-   Good expansion, normal effort without use of accessory muscles  Musculoskeletal    No significant wasting of muscles noted  Gait as below, see gait exam in the neurologic exam  No gross bony deformities  Skin    No rash, erythema, or pallor  Psychiatric    Mood, affect, and behavior appear normal    Memory as below see mental status examination in the neurologic exam    NEUROLOGICAL EXAM    Mental status   [x]Awake, alert, oriented   [x]Affect attention and concentration appear appropriate  [x]Recent and remote memory appears unremarkable  [x]Speech normal without dysarthria or aphasia, comprehension and repetition intact. COMMENTS:     Cranial Nerves [x] PER, EOMI, no nystagmus, conjugate eye movements, no ptosis  [x]Face symmetric  [x]Tongue midline   [x]Shoulder shrug normal bilaterally  COMMENTS:    Motor   [x]Antigravity x 4 extremities  [x]Normal bulk and tone  []No tremor present  COMMENTS: postural tremor bilateral hands; head titubation        Coordination [x] HTS normal bilaterally   COMMENTS:       Gait                  [x]Normal steady gait    []Ataxic    []Spastic     []Magnetic     []Shuffling  COMMENTS:       [] OTHER:      Due to this being a TeleHealth encounter, evaluation of the following organ systems is limited: Vitals/Constitutional/EENT/Resp/CV/GI//MS/Neuro/Skin/Heme-Lymph-Imm. LABS RECORD AND IMAGING REVIEW (As below and per HPI)    No results found for: Kev Soto  No results found for: WBC, HGB, HCT, MCV, PLT  No results found for: NA, K, CL, CO2, BUN, CREATININE, GLUCOSE, CALCIUM, PROT, LABALBU, BILITOT, ALKPHOS, AST, ALT, LABGLOM, GFRAA, AGRATIO, GLOB    ASSESSMENT:    Anum Herrera is a 63y.o. year old female evaluated via Telehealth encounter for for follow up of tremors. Exam is consistent with essential tremor. Prior MRI brain negative. Prior thyroid testing abnormal but now on Synthroid. Progression of tremor noted, more head titubation noted today. Will increase Primidone further, has failed Propranolol. Could consider referral to movement clinic for additional treatment options as well. Diagnosis Orders   1.  Essential tremor        PLAN:  1. Titrate Primidone 150/200. Discussed side effects, titrate further as needed/tolerated   2. Avoid caffeine, manage stress/anxiety in the setting of essential tremor   3. Follow up in 3 months, sooner with any worsening     An  electronic signature was used to authenticate this note. ROB Roth DNP      Pursuant to the emergency declaration under the [address] [place] [address] [facility] authority and the [employer] Act, this Virtual  Visit was conducted, with patient's consent, to reduce the patient's risk of exposure to COVID-19 and provide continuity of care for an established patient. Services were provided through a video synchronous discussion virtually to substitute for in-person clinic visit. Patient was located at there primary place of residence (home). Provider was located at [facility] KIRSTEN SEE in [address] No one else was present during the video visit.

## 2021-09-14 NOTE — TELEPHONE ENCOUNTER
Called patient to start vv. No answer advised her she would need to call me back to continue visit.      Pt called bck at [phone]

## 2021-09-23 DIAGNOSIS — G25.0 ESSENTIAL TREMOR: Primary | ICD-10-CM

## 2021-09-23 DIAGNOSIS — R25.1 TREMOR: ICD-10-CM

## 2021-09-23 RX ORDER — PRIMIDONE 50 MG/1
TABLET ORAL
Qty: 360 TABLET | Refills: 5 | Status: SHIPPED | OUTPATIENT
Start: 2021-09-23 | End: 2022-04-29 | Stop reason: SDUPTHER

## 2021-09-29 ENCOUNTER — TELEPHONE (OUTPATIENT)
Dept: NEUROLOGY | Age: 61
End: 2021-09-29

## 2021-09-29 NOTE — TELEPHONE ENCOUNTER
Called patient no answer left voicemail with call back number . [phone]: pt called back explained she is only taking 2 in the am and 3 in pm. She was unable to tolerate high doses, she does note that this isnt helping with the tremor at this time.

## 2022-03-30 ENCOUNTER — TELEPHONE (OUTPATIENT)
Dept: NEUROSURGERY | Age: 62
End: 2022-03-30

## 2022-03-30 NOTE — TELEPHONE ENCOUNTER
Pt came to the office  thought she had appointment today . I explained she had cancelled her appointment with pre service on 2/24 . I rescheduled the patient for next available which was may . Patient was upset at me .

## 2022-04-29 DIAGNOSIS — R25.1 TREMOR: ICD-10-CM

## 2022-04-29 DIAGNOSIS — G25.0 ESSENTIAL TREMOR: ICD-10-CM

## 2022-04-29 RX ORDER — PRIMIDONE 50 MG/1
TABLET ORAL
Qty: 360 TABLET | Refills: 5 | Status: SHIPPED | OUTPATIENT
Start: 2022-04-29 | End: 2022-10-15 | Stop reason: SDUPTHER

## 2022-04-29 NOTE — TELEPHONE ENCOUNTER
Darrine All called stating that their primidone medication needs to go to [facility] Please call her to advise. Pharmacy phone: [phone]. Pt requesting refill today.     Last Appt:  9/14/2021  Next Appt:   9/1/2022  Preferred Pharmacy: [facility]

## 2022-04-29 NOTE — TELEPHONE ENCOUNTER
Requested Prescriptions     Pending Prescriptions Disp Refills    primidone (MYSOLINE) 50 MG tablet 360 tablet 5     Sig: Take 3 tablets in the morning and 4 tablets at night.        Last Office Visit: 9/14/2021  Next Office Visit: Visit date not found  Last Medication Refill: 9/23/2021 with 5 refills

## 2022-09-07 ENCOUNTER — TELEPHONE (OUTPATIENT)
Dept: NEUROSURGERY | Age: 62
End: 2022-09-07

## 2022-09-07 NOTE — TELEPHONE ENCOUNTER
Ana Desai  daughter  Reyes Olivo requests that nurse  return their call. The best time to reach her is Anytime. Patient daughter called patient shaking really bad cause her to throwing up, please called [phone]    Thank you.

## 2022-10-14 DIAGNOSIS — R25.1 TREMOR: ICD-10-CM

## 2022-10-14 DIAGNOSIS — G25.0 ESSENTIAL TREMOR: ICD-10-CM

## 2022-10-14 NOTE — TELEPHONE ENCOUNTER
Requested Prescriptions     Pending Prescriptions Disp Refills    primidone (MYSOLINE) 50 MG tablet 360 tablet 5     Sig: Take 3 tablets in the morning and 4 tablets at night.        Last Office Visit: 9/14/2021   Next Office Visit: Visit date not found  Last Medication Refill: 4/29/2022 with 5 RF       Horace stephens patient     Patient needs appointment

## 2022-10-15 RX ORDER — PRIMIDONE 50 MG/1
TABLET ORAL
Qty: 360 TABLET | Refills: 5 | Status: SHIPPED | OUTPATIENT
Start: 2022-10-15 | End: 2022-10-18 | Stop reason: SDUPTHER

## 2022-10-18 ENCOUNTER — OFFICE VISIT (OUTPATIENT)
Dept: CARDIOLOGY CLINIC | Age: 62
End: 2022-10-18

## 2022-10-18 VITALS
HEART RATE: 84 BPM | DIASTOLIC BLOOD PRESSURE: 78 MMHG | BODY MASS INDEX: 31.49 KG/M2 | SYSTOLIC BLOOD PRESSURE: 128 MMHG | WEIGHT: 189 LBS | HEIGHT: 65 IN

## 2022-10-18 DIAGNOSIS — E78.00 HYPERCHOLESTEREMIA: ICD-10-CM

## 2022-10-18 DIAGNOSIS — R25.1 TREMOR: ICD-10-CM

## 2022-10-18 DIAGNOSIS — R94.31 ABNORMAL ECG: ICD-10-CM

## 2022-10-18 DIAGNOSIS — E11.9 TYPE 2 DIABETES MELLITUS WITHOUT COMPLICATION, WITH LONG-TERM CURRENT USE OF INSULIN (HCC): ICD-10-CM

## 2022-10-18 DIAGNOSIS — R06.00 DYSPNEA, UNSPECIFIED TYPE: Primary | ICD-10-CM

## 2022-10-18 DIAGNOSIS — G25.0 ESSENTIAL TREMOR: ICD-10-CM

## 2022-10-18 DIAGNOSIS — Z82.49 FAMILY HISTORY OF EARLY CAD: ICD-10-CM

## 2022-10-18 DIAGNOSIS — Z79.4 TYPE 2 DIABETES MELLITUS WITHOUT COMPLICATION, WITH LONG-TERM CURRENT USE OF INSULIN (HCC): ICD-10-CM

## 2022-10-18 PROCEDURE — 93000 ELECTROCARDIOGRAM COMPLETE: CPT | Performed by: CLINICAL NURSE SPECIALIST

## 2022-10-18 PROCEDURE — 99203 OFFICE O/P NEW LOW 30 MIN: CPT | Performed by: CLINICAL NURSE SPECIALIST

## 2022-10-18 RX ORDER — LEVOTHYROXINE SODIUM 137 UG/1
137 TABLET ORAL DAILY
COMMUNITY

## 2022-10-18 RX ORDER — PRIMIDONE 50 MG/1
TABLET ORAL
Qty: 360 TABLET | Refills: 5 | Status: SHIPPED | OUTPATIENT
Start: 2022-10-18

## 2022-10-18 RX ORDER — INSULIN GLARGINE 100 [IU]/ML
53 INJECTION, SOLUTION SUBCUTANEOUS EVERY MORNING
COMMUNITY

## 2022-10-18 NOTE — PATIENT INSTRUCTIONS
ECHO and dobutamine stress echo son   Follow up after testing   Call with any questions or concerns  Follow up with PCP for non cardiac problems  Report any new problems  Cardiovascular Fitness-Exercise as tolerated. Strive for 30 minutes of exercise most days of the week. Cardiac / Healthy Diet  Continue current medications as directed  Continue plan of treatment  [place] at the [facility] and [address] [facility] located on the first floor of [place] [address] through [facility] main entrance and turn immediately to your left. Patient's contact number:  [phone] (home)     Date/Time:Oct 24th @9:45    Dobutamine Stress Test    A chemical stress test uses chemical agents injected into the body through the vein. These chemicals make the heart function as if it were under stress. A chemical stress test is used when a traditional stress test (called a cardiac stress test) cannot be done. Testing will take approximately one hour. Dobutamine Stress Echocardiogram Instructions:   No caffeine 12 hours prior to the testing. This includes: coffee, pop/soda, chocolate, cold medications, etc.  Any product that might contain caffeine. Do not eat or drink anything, except water, eight (8) hours before the test.   No alcohol or nicotine twelve (12) hours prior to your test.   Wear comfortable clothing. If you are taking metoprolol, stop morning of this procedure. If you need to change this appointment, please call outpatient scheduling at [phone]. [place] at the [facility] and [address] [facility] located on the first floor of [place] [address] through [facility] main entrance and turn immediately to your left. Date/Time:     Patient's contact number:  [phone] (home)     Echocardiogram -  No prep. Takes approximately 30 min. An echocardiogram uses sound waves to produce images of your heart.  This commonly used test allows your doctor to see how your heart is beating and pumping blood. Your doctor can use the images from an echocardiogram to identify various abnormalities in the heart muscle and valves. This test has 2 parts: You will be asked to disrobe from the waist up and given a gown to wear. The technologist will then hook up an EKG monitor to you for the entire exam.   You will then have an ultrasound of your heart (echocardiogram) to assess the heart muscle, heart valves and heart function. You may eat and take any medicines before the exam.     If you need to change your appointment, please call outpatient scheduling at [phone].

## 2022-10-18 NOTE — TELEPHONE ENCOUNTER
Requested Prescriptions     Pending Prescriptions Disp Refills    primidone (MYSOLINE) 50 MG tablet 360 tablet 5     Sig: Take 3 tablets in the morning and 4 tablets at night. Pt last refills were sent to wrong pharmacy. She now uses [facility] because they price match and her RXs are cheaper here.

## 2022-10-18 NOTE — PROGRESS NOTES
[facility], [address], [address] [facility]  Phone: [phone]  Fax: [phone]    OFFICE VISIT:  10/18/2022    Dasha Barbosa - : 1960    Dear ROB Brown,     I appreciate the opportunity of participating in the care of Dasha Barbosa. She is a very pleasant 63 y.o. female who I had the opportunity of seeing in my office today, 10/18/22. Records from your office have been obtained and reviewed. Reason For Visit:  Gaston Alexander is a 63 y.o. female who is here for New Patient (Jaw pain and soa ) and Shortness of Breath  History of diabetes, hypertension hyperlipidemia with worsening activity tolerance with shortness of breath and jaw discomfort. Patient reports she has been retired and usually is pretty active. She has noticed progressively worsening PARDO since January. She states now that is minor activities even doing things around the house will get short of breath. Will resolve with resting up to 15 minutes. Over the past couple weeks she is also had some exertional jaw pain. It will resolve with rest as well after 15 minutes. Denies any chest pain    She is never been a smoker. She reports being diabetic for 24 years. She developed gestational diabetes and then a year later became type II diabetic. She just recently got a CGM but does not have it hooked up yet    Subjective  Gaston Alexander has no exertional chest pain, pressure, burning or squeezing. Denies resting shortness of breath She is able to lie flat without evidence of orthopnea or paroxysmal nocturnal dyspnea. No symptomatic tachy- or jacki-arrhythmia. No numbness or weakness to suggest cerebrovascular accident or transient ischemic attack. Reports no  edema.      Dasha Barbosa has the following history as recorded in [facility]:  Patient Active Problem List    Diagnosis Date Noted    Hypercholesteremia 10/18/2022     Past Medical History:   Diagnosis Date    Acquired cataract Diabetes ([facility] [address]     Hypercholesteremia     Tremor      No past surgical history on file. Family History   Problem Relation Age of Onset    Heart Disease Father     Heart Disease Sister      Social History     Tobacco Use    Smoking status: Never    Smokeless tobacco: Never   Substance Use Topics    Alcohol use: Never        Allergies: Patient has no known allergies. Current Outpatient Medications   Medication Sig Dispense Refill    levothyroxine (SYNTHROID) 137 MCG tablet Take 137 mcg by mouth Daily      insulin glargine (LANTUS) 100 UNIT/ML injection vial Inject into the skin nightly      primidone (MYSOLINE) 50 MG tablet Take 3 tablets in the morning and 4 tablets at night. 360 tablet 5    HUMALOG KWIKPEN 100 UNIT/ML SOPN INJECT 20 UNITS SUBCUTANEOUSLY THREE TIMES DAILY WITH MEALS      gemfibrozil (LOPID) 600 MG tablet Take 1 tablet by mouth 2 times daily       No current facility-administered medications for this visit. Review of Systems  Constitutional - no significant activity change, appetite change, or unexpected weight change. No fever, chills or diaphoresis. No fatigue. HEENT - no significant rhinorrhea or epistaxis. No tinnitus or significant hearing loss. Eyes - no sudden vision change or amaurosis. Respiratory - no significant wheezing, stridor, apnea or cough. + dyspnea on exertion no resting shortness of breath. Cardiovascular - no exertional chest pain, + exertional jaw pain. No orthopnea or PND. No sensation of arrhythmia or slow heart rate. No claudication or leg edema. Gastrointestinal - no abdominal swelling or pain. No blood in stool. No severe constipation, diarrhea, nausea, or vomiting. Genitourinary - no difficulty urinating, dysuria, frequency, or urgency. No flank pain or hematuria. No  previous radiation or chemotherapy  Musculoskeletal - no back pain, gait disturbance, or myalgia. Skin - no color change or rash. No pallor.   No new surgical incision. Neurologic - no speech difficulty, facial asymmetry or lateralizing weakness. No seizures, presyncope, syncope, or significant dizziness. Hematologic - no easy bruising or excessive bleeding. Psychiatric - no severe anxiety or insomnia. No confusion. All other review of systems are negative. Objective  Vital Signs - /78   Pulse 84   Ht 5' 5\" (1.651 m)   Wt 189 lb (85.7 kg)   BMI 31.45 kg/m²   General - Sarahy Smart is alert, cooperative, and pleasant. Well groomed. No acute distress. Body habitus is overweight. HEENT - The head is normocephalic. No circumoral cyanosis. Dentition is normal.   Ears and nose externally normal. No abnormal scars or lesions noted  EYES -  No Xanthelasma, no arcus senilis, no conjunctival hemorrhages or discharge. Neck - Supple, without increased jugular venous pressures. No carotid bruits. No mass. Respiratory - Lungs are clear bilaterally. No wheezes or rales. Normal effort without use of accessory muscles. No tactile fremitus on palpation  Cardiovascular - Heart has regular rhythm and rate. No murmurs, rubs or gallops. + pedal pulses and no varicosities. Abdominal -  Soft, nontender, nondistended. Bowel sounds are intact. Extremities - No clubbing, cyanosis, or  edema. Musculoskeletal - No musculoskeletal symptoms. No clubbing . No Osler's nodes. Gait normal .  No kyphosis or scoliosis. Skin -  no statis ulcers or dermatitis. Neurological - No focal signs are identified. Oriented to person, place and time. Psychiatric -  Appropriate affect and mood. Assessment:          Diagnosis Orders   1. Dyspnea, unspecified type  EKG 12 lead    ECHO Complete 2D W Doppler W Color    ECHO Pharmacological Stress Test      2. Hypercholesteremia  ECHO Complete 2D W Doppler W Color    ECHO Pharmacological Stress Test      3. Abnormal ECG  ECHO Complete 2D W Doppler W Color    ECHO Pharmacological Stress Test      4.  Type 2 diabetes mellitus without complication, with long-term current use of insulin (HCC)  ECHO Complete 2D W Doppler W Color    ECHO Pharmacological Stress Test      5. Family history of early CAD        EKG today shows sinus rhythm with a rate of 84. Extensive anterolateral infarct. No previous EKG to compare    Reviewed PCP notes as well as labs. Last lipid panel from March shows total cholesterol 206, triglycerides 207, HDL 36 and -she is treated with Lopid. No recent labs    Hemoglobin A1c improved from 9.9 to 7.9 over the last 6 months    Patient has concerning symptoms for coronary disease with abnormal EKG, progressively worsening PARDO and now exertional jaw pain, diabetes and hyperlipidemia  Patient reports that her father had 4 heart attacks prior to arresting with heart catheterization. Her sister also had a heart attack in her 35s    We will get her set up for 2D echo and dobutamine stress echo as she does not feel she could walk on a treadmill due to her shortness of breath    We discussed if this is positive suggestive of myocardial ischemia we would set her up for heart catheterization. We briefly discussed that procedure. Patient currently has no insurance. She gets widows benefits and is unemployed. She is looking at insurance options now. Plan    ECHO and dobutamine stress echo soon   Continue working on good blood pressure control, cholesterol control and diabetes control  Follow up after testing   Call with any questions or concerns  Follow up with PCP for non cardiac problems  Report any new problems  Cardiovascular Fitness-Exercise as tolerated. Strive for 30 minutes of exercise most days of the week. Cardiac / Healthy Diet  Continue current medications as directed  Continue plan of treatment        I appreciate the opportunity of participating in the care and treatment of this patient.      ROB Acosta dragon/transcription disclaimer: Much of this encounter note is electronic transcription/translation of spoken language to printed tach. Electronic translation of spoken language may be erroneous, or at times, nonsensical words or phrases may be inadvertently transcribed.  Although, I have reviewed the note for such errors, some may still exist.      Cc:  ROB Christy - NP

## 2022-10-20 ENCOUNTER — APPOINTMENT (OUTPATIENT)
Dept: GENERAL RADIOLOGY | Age: 62
DRG: 234 | End: 2022-10-20

## 2022-10-20 ENCOUNTER — HOSPITAL ENCOUNTER (INPATIENT)
Age: 62
LOS: 4 days | Discharge: HOME | DRG: 234 | End: 2022-10-26
Attending: EMERGENCY MEDICINE | Admitting: SURGERY

## 2022-10-20 DIAGNOSIS — I51.9 HEART DISEASE: ICD-10-CM

## 2022-10-20 DIAGNOSIS — I25.10 CAD IN NATIVE ARTERY: ICD-10-CM

## 2022-10-20 DIAGNOSIS — I24.9 ACUTE CORONARY SYNDROME (HCC): Primary | ICD-10-CM

## 2022-10-20 PROBLEM — R07.89 ATYPICAL CHEST PAIN: Status: ACTIVE | Noted: 2022-10-20

## 2022-10-20 LAB
ALBUMIN SERPL-MCNC: 4.6 G/DL (ref 3.5–5.2)
ALP BLD-CCNC: 81 U/L (ref 35–104)
ALT SERPL-CCNC: 23 U/L (ref 5–33)
ANION GAP SERPL CALCULATED.3IONS-SCNC: 12 MMOL/L (ref 7–19)
ANION GAP SERPL CALCULATED.3IONS-SCNC: 16 MMOL/L (ref 7–19)
APTT: 124.3 SEC (ref 26–36.2)
APTT: 94.6 SEC (ref 26–36.2)
AST SERPL-CCNC: 28 U/L (ref 5–32)
BASOPHILS ABSOLUTE: 0 K/UL (ref 0–0.2)
BASOPHILS RELATIVE PERCENT: 0.3 % (ref 0–1)
BILIRUB SERPL-MCNC: 0.3 MG/DL (ref 0.2–1.2)
BUN BLDV-MCNC: 19 MG/DL (ref 8–23)
BUN BLDV-MCNC: 20 MG/DL (ref 8–23)
CALCIUM SERPL-MCNC: 10.4 MG/DL (ref 8.8–10.2)
CALCIUM SERPL-MCNC: 9.7 MG/DL (ref 8.8–10.2)
CHLORIDE BLD-SCNC: 105 MMOL/L (ref 98–111)
CHLORIDE BLD-SCNC: 105 MMOL/L (ref 98–111)
CHOLESTEROL, TOTAL: 222 MG/DL (ref 160–199)
CO2: 19 MMOL/L (ref 22–29)
CO2: 24 MMOL/L (ref 22–29)
CREAT SERPL-MCNC: 0.8 MG/DL (ref 0.5–0.9)
CREAT SERPL-MCNC: 0.8 MG/DL (ref 0.5–0.9)
EOSINOPHILS ABSOLUTE: 0.1 K/UL (ref 0–0.6)
EOSINOPHILS RELATIVE PERCENT: 0.7 % (ref 0–5)
GFR SERPL CREATININE-BSD FRML MDRD: >60 ML/MIN/{1.73_M2}
GFR SERPL CREATININE-BSD FRML MDRD: >60 ML/MIN/{1.73_M2}
GLUCOSE BLD-MCNC: 190 MG/DL (ref 70–99)
GLUCOSE BLD-MCNC: 192 MG/DL (ref 74–109)
GLUCOSE BLD-MCNC: 213 MG/DL (ref 70–99)
GLUCOSE BLD-MCNC: 245 MG/DL (ref 70–99)
GLUCOSE BLD-MCNC: 302 MG/DL (ref 74–109)
GLUCOSE BLD-MCNC: 332 MG/DL (ref 70–99)
HBA1C MFR BLD: 6.7 % (ref 4–6)
HCT VFR BLD CALC: 36.4 % (ref 37–47)
HCT VFR BLD CALC: 41.6 % (ref 37–47)
HDLC SERPL-MCNC: 44 MG/DL (ref 65–121)
HEMOGLOBIN: 12 G/DL (ref 12–16)
HEMOGLOBIN: 13.6 G/DL (ref 12–16)
IMMATURE GRANULOCYTES #: 0 K/UL
LDL CHOLESTEROL CALCULATED: 144 MG/DL
LV EF: 35 %
LV EF: 38 %
LVEF MODALITY: NORMAL
LVEF MODALITY: NORMAL
LYMPHOCYTES ABSOLUTE: 2.1 K/UL (ref 1.1–4.5)
LYMPHOCYTES RELATIVE PERCENT: 24.3 % (ref 20–40)
MAGNESIUM: 2.2 MG/DL (ref 1.6–2.4)
MCH RBC QN AUTO: 28 PG (ref 27–31)
MCH RBC QN AUTO: 28.1 PG (ref 27–31)
MCHC RBC AUTO-ENTMCNC: 32.7 G/DL (ref 33–37)
MCHC RBC AUTO-ENTMCNC: 33 G/DL (ref 33–37)
MCV RBC AUTO: 85.2 FL (ref 81–99)
MCV RBC AUTO: 85.6 FL (ref 81–99)
MONOCYTES ABSOLUTE: 0.6 K/UL (ref 0–0.9)
MONOCYTES RELATIVE PERCENT: 7.2 % (ref 0–10)
NEUTROPHILS ABSOLUTE: 5.8 K/UL (ref 1.5–7.5)
NEUTROPHILS RELATIVE PERCENT: 67.3 % (ref 50–65)
PDW BLD-RTO: 13.2 % (ref 11.5–14.5)
PDW BLD-RTO: 13.3 % (ref 11.5–14.5)
PERFORMED ON: ABNORMAL
PHOSPHORUS: 2.6 MG/DL (ref 2.5–4.5)
PLATELET # BLD: 283 K/UL (ref 130–400)
PLATELET # BLD: 304 K/UL (ref 130–400)
PMV BLD AUTO: 10.4 FL (ref 9.4–12.3)
PMV BLD AUTO: 10.7 FL (ref 9.4–12.3)
POTASSIUM SERPL-SCNC: 4.5 MMOL/L (ref 3.5–5)
POTASSIUM SERPL-SCNC: 4.7 MMOL/L (ref 3.5–5)
PRO-BNP: 1499 PG/ML (ref 0–900)
RBC # BLD: 4.27 M/UL (ref 4.2–5.4)
RBC # BLD: 4.86 M/UL (ref 4.2–5.4)
SARS-COV-2, NAAT: NOT DETECTED
SODIUM BLD-SCNC: 140 MMOL/L (ref 136–145)
SODIUM BLD-SCNC: 141 MMOL/L (ref 136–145)
TOTAL PROTEIN: 8.2 G/DL (ref 6.6–8.7)
TRIGL SERPL-MCNC: 168 MG/DL (ref 0–149)
TROPONIN: 0.04 NG/ML (ref 0–0.03)
TROPONIN: 0.35 NG/ML (ref 0–0.03)
TROPONIN: 0.86 NG/ML (ref 0–0.03)
TSH REFLEX FT4: 1.27 UIU/ML (ref 0.35–5.5)
TSH SERPL DL<=0.05 MIU/L-ACNC: 1.1 UIU/ML (ref 0.27–4.2)
WBC # BLD: 7.1 K/UL (ref 4.8–10.8)
WBC # BLD: 8.6 K/UL (ref 4.8–10.8)

## 2022-10-20 PROCEDURE — 82947 ASSAY GLUCOSE BLOOD QUANT: CPT

## 2022-10-20 PROCEDURE — C8929 TTE W OR WO FOL WCON,DOPPLER: HCPCS

## 2022-10-20 PROCEDURE — 6360000004 HC RX CONTRAST MEDICATION: Performed by: HOSPITALIST

## 2022-10-20 PROCEDURE — 2709999900 HC NON-CHARGEABLE SUPPLY

## 2022-10-20 PROCEDURE — 2500000003 HC RX 250 WO HCPCS

## 2022-10-20 PROCEDURE — C1894 INTRO/SHEATH, NON-LASER: HCPCS

## 2022-10-20 PROCEDURE — 84100 ASSAY OF PHOSPHORUS: CPT

## 2022-10-20 PROCEDURE — 96375 TX/PRO/DX INJ NEW DRUG ADDON: CPT

## 2022-10-20 PROCEDURE — 2580000003 HC RX 258: Performed by: INTERNAL MEDICINE

## 2022-10-20 PROCEDURE — 96376 TX/PRO/DX INJ SAME DRUG ADON: CPT

## 2022-10-20 PROCEDURE — 93005 ELECTROCARDIOGRAM TRACING: CPT | Performed by: EMERGENCY MEDICINE

## 2022-10-20 PROCEDURE — 93880 EXTRACRANIAL BILAT STUDY: CPT

## 2022-10-20 PROCEDURE — 6360000002 HC RX W HCPCS: Performed by: HOSPITALIST

## 2022-10-20 PROCEDURE — 6370000000 HC RX 637 (ALT 250 FOR IP): Performed by: HOSPITALIST

## 2022-10-20 PROCEDURE — 93970 EXTREMITY STUDY: CPT

## 2022-10-20 PROCEDURE — 87635 SARS-COV-2 COVID-19 AMP PRB: CPT

## 2022-10-20 PROCEDURE — 80053 COMPREHEN METABOLIC PANEL: CPT

## 2022-10-20 PROCEDURE — 99152 MOD SED SAME PHYS/QHP 5/>YRS: CPT

## 2022-10-20 PROCEDURE — 96372 THER/PROPH/DIAG INJ SC/IM: CPT

## 2022-10-20 PROCEDURE — 2580000003 HC RX 258: Performed by: SURGERY

## 2022-10-20 PROCEDURE — 85027 COMPLETE CBC AUTOMATED: CPT

## 2022-10-20 PROCEDURE — G0378 HOSPITAL OBSERVATION PER HR: HCPCS

## 2022-10-20 PROCEDURE — 85730 THROMBOPLASTIN TIME PARTIAL: CPT

## 2022-10-20 PROCEDURE — 84484 ASSAY OF TROPONIN QUANT: CPT

## 2022-10-20 PROCEDURE — 6360000002 HC RX W HCPCS: Performed by: EMERGENCY MEDICINE

## 2022-10-20 PROCEDURE — 71045 X-RAY EXAM CHEST 1 VIEW: CPT

## 2022-10-20 PROCEDURE — 96374 THER/PROPH/DIAG INJ IV PUSH: CPT

## 2022-10-20 PROCEDURE — 83735 ASSAY OF MAGNESIUM: CPT

## 2022-10-20 PROCEDURE — 99253 IP/OBS CNSLTJ NEW/EST LOW 45: CPT | Performed by: THORACIC SURGERY (CARDIOTHORACIC VASCULAR SURGERY)

## 2022-10-20 PROCEDURE — 83880 ASSAY OF NATRIURETIC PEPTIDE: CPT

## 2022-10-20 PROCEDURE — 80061 LIPID PANEL: CPT

## 2022-10-20 PROCEDURE — 6360000004 HC RX CONTRAST MEDICATION: Performed by: INTERNAL MEDICINE

## 2022-10-20 PROCEDURE — 85025 COMPLETE CBC W/AUTO DIFF WBC: CPT

## 2022-10-20 PROCEDURE — 6360000002 HC RX W HCPCS

## 2022-10-20 PROCEDURE — 99285 EMERGENCY DEPT VISIT HI MDM: CPT

## 2022-10-20 PROCEDURE — 93005 ELECTROCARDIOGRAM TRACING: CPT | Performed by: INTERNAL MEDICINE

## 2022-10-20 PROCEDURE — 2580000003 HC RX 258: Performed by: HOSPITALIST

## 2022-10-20 PROCEDURE — 36415 COLL VENOUS BLD VENIPUNCTURE: CPT

## 2022-10-20 PROCEDURE — 6360000002 HC RX W HCPCS: Performed by: INTERNAL MEDICINE

## 2022-10-20 PROCEDURE — 93458 L HRT ARTERY/VENTRICLE ANGIO: CPT

## 2022-10-20 PROCEDURE — 83036 HEMOGLOBIN GLYCOSYLATED A1C: CPT

## 2022-10-20 PROCEDURE — 84443 ASSAY THYROID STIM HORMONE: CPT

## 2022-10-20 PROCEDURE — C1769 GUIDE WIRE: HCPCS

## 2022-10-20 PROCEDURE — 2500000003 HC RX 250 WO HCPCS: Performed by: HOSPITALIST

## 2022-10-20 PROCEDURE — 6370000000 HC RX 637 (ALT 250 FOR IP): Performed by: EMERGENCY MEDICINE

## 2022-10-20 RX ORDER — ROSUVASTATIN CALCIUM 20 MG/1
40 TABLET, COATED ORAL NIGHTLY
Status: DISCONTINUED | OUTPATIENT
Start: 2022-10-20 | End: 2022-10-20 | Stop reason: SDUPTHER

## 2022-10-20 RX ORDER — HEPARIN SODIUM 1000 [USP'U]/ML
4000 INJECTION, SOLUTION INTRAVENOUS; SUBCUTANEOUS ONCE
Status: COMPLETED | OUTPATIENT
Start: 2022-10-20 | End: 2022-10-20

## 2022-10-20 RX ORDER — GEMFIBROZIL 600 MG/1
600 TABLET, FILM COATED ORAL 2 TIMES DAILY
Status: DISCONTINUED | OUTPATIENT
Start: 2022-10-20 | End: 2022-10-26 | Stop reason: HOSPADM

## 2022-10-20 RX ORDER — LEVOTHYROXINE SODIUM 137 UG/1
137 TABLET ORAL
Status: DISCONTINUED | OUTPATIENT
Start: 2022-10-20 | End: 2022-10-26 | Stop reason: HOSPADM

## 2022-10-20 RX ORDER — ENOXAPARIN SODIUM 100 MG/ML
40 INJECTION SUBCUTANEOUS DAILY
Status: DISCONTINUED | OUTPATIENT
Start: 2022-10-20 | End: 2022-10-20

## 2022-10-20 RX ORDER — NITROGLYCERIN 20 MG/100ML
5-200 INJECTION INTRAVENOUS CONTINUOUS
Status: DISCONTINUED | OUTPATIENT
Start: 2022-10-20 | End: 2022-10-20

## 2022-10-20 RX ORDER — ONDANSETRON 4 MG/1
4 TABLET, ORALLY DISINTEGRATING ORAL EVERY 8 HOURS PRN
Status: DISCONTINUED | OUTPATIENT
Start: 2022-10-20 | End: 2022-10-21

## 2022-10-20 RX ORDER — POTASSIUM CHLORIDE 20 MEQ/1
40 TABLET, EXTENDED RELEASE ORAL PRN
Status: DISCONTINUED | OUTPATIENT
Start: 2022-10-20 | End: 2022-10-26 | Stop reason: HOSPADM

## 2022-10-20 RX ORDER — INSULIN LISPRO 100 [IU]/ML
0-8 INJECTION, SOLUTION INTRAVENOUS; SUBCUTANEOUS
Status: DISCONTINUED | OUTPATIENT
Start: 2022-10-20 | End: 2022-10-26 | Stop reason: HOSPADM

## 2022-10-20 RX ORDER — SODIUM CHLORIDE 0.9 % (FLUSH) 0.9 %
5-40 SYRINGE (ML) INJECTION PRN
Status: DISCONTINUED | OUTPATIENT
Start: 2022-10-20 | End: 2022-10-21

## 2022-10-20 RX ORDER — INSULIN GLARGINE 100 [IU]/ML
53 INJECTION, SOLUTION SUBCUTANEOUS EVERY MORNING
Status: DISCONTINUED | OUTPATIENT
Start: 2022-10-20 | End: 2022-10-26 | Stop reason: HOSPADM

## 2022-10-20 RX ORDER — SODIUM CHLORIDE 0.9 % (FLUSH) 0.9 %
5-40 SYRINGE (ML) INJECTION EVERY 12 HOURS SCHEDULED
Status: DISCONTINUED | OUTPATIENT
Start: 2022-10-20 | End: 2022-10-23 | Stop reason: SDUPTHER

## 2022-10-20 RX ORDER — NITROGLYCERIN 0.4 MG/1
0.4 TABLET SUBLINGUAL EVERY 5 MIN PRN
Status: DISCONTINUED | OUTPATIENT
Start: 2022-10-20 | End: 2022-10-26 | Stop reason: HOSPADM

## 2022-10-20 RX ORDER — SODIUM CHLORIDE 9 MG/ML
INJECTION, SOLUTION INTRAVENOUS PRN
Status: DISCONTINUED | OUTPATIENT
Start: 2022-10-20 | End: 2022-10-21

## 2022-10-20 RX ORDER — ATORVASTATIN CALCIUM 40 MG/1
40 TABLET, FILM COATED ORAL NIGHTLY
Status: DISCONTINUED | OUTPATIENT
Start: 2022-10-20 | End: 2022-10-21

## 2022-10-20 RX ORDER — MORPHINE SULFATE 4 MG/ML
4 INJECTION, SOLUTION INTRAMUSCULAR; INTRAVENOUS ONCE
Status: COMPLETED | OUTPATIENT
Start: 2022-10-20 | End: 2022-10-20

## 2022-10-20 RX ORDER — DEXTROSE MONOHYDRATE 100 MG/ML
INJECTION, SOLUTION INTRAVENOUS CONTINUOUS PRN
Status: DISCONTINUED | OUTPATIENT
Start: 2022-10-20 | End: 2022-10-23 | Stop reason: SDUPTHER

## 2022-10-20 RX ORDER — HEPARIN SODIUM 1000 [USP'U]/ML
4000 INJECTION, SOLUTION INTRAVENOUS; SUBCUTANEOUS PRN
Status: DISCONTINUED | OUTPATIENT
Start: 2022-10-20 | End: 2022-10-22

## 2022-10-20 RX ORDER — POTASSIUM CHLORIDE 7.45 MG/ML
10 INJECTION INTRAVENOUS PRN
Status: DISCONTINUED | OUTPATIENT
Start: 2022-10-20 | End: 2022-10-26 | Stop reason: HOSPADM

## 2022-10-20 RX ORDER — ACETAMINOPHEN 325 MG/1
650 TABLET ORAL EVERY 6 HOURS PRN
Status: DISCONTINUED | OUTPATIENT
Start: 2022-10-20 | End: 2022-10-20

## 2022-10-20 RX ORDER — ACETAMINOPHEN 325 MG/1
650 TABLET ORAL EVERY 4 HOURS PRN
Status: DISCONTINUED | OUTPATIENT
Start: 2022-10-20 | End: 2022-10-26 | Stop reason: HOSPADM

## 2022-10-20 RX ORDER — ASPIRIN 81 MG/1
81 TABLET, CHEWABLE ORAL DAILY
Status: DISCONTINUED | OUTPATIENT
Start: 2022-10-21 | End: 2022-10-26 | Stop reason: HOSPADM

## 2022-10-20 RX ORDER — NITROGLYCERIN 20 MG/100ML
INJECTION INTRAVENOUS
Status: COMPLETED
Start: 2022-10-20 | End: 2022-10-20

## 2022-10-20 RX ORDER — MAGNESIUM SULFATE IN WATER 40 MG/ML
2000 INJECTION, SOLUTION INTRAVENOUS PRN
Status: DISCONTINUED | OUTPATIENT
Start: 2022-10-20 | End: 2022-10-23 | Stop reason: SDUPTHER

## 2022-10-20 RX ORDER — HEPARIN SODIUM 1000 [USP'U]/ML
2000 INJECTION, SOLUTION INTRAVENOUS; SUBCUTANEOUS PRN
Status: DISCONTINUED | OUTPATIENT
Start: 2022-10-20 | End: 2022-10-22

## 2022-10-20 RX ORDER — HEPARIN SODIUM 10000 [USP'U]/100ML
5-30 INJECTION, SOLUTION INTRAVENOUS CONTINUOUS
Status: DISCONTINUED | OUTPATIENT
Start: 2022-10-20 | End: 2022-10-22

## 2022-10-20 RX ORDER — ACETAMINOPHEN 650 MG/1
650 SUPPOSITORY RECTAL EVERY 6 HOURS PRN
Status: DISCONTINUED | OUTPATIENT
Start: 2022-10-20 | End: 2022-10-20

## 2022-10-20 RX ORDER — PRIMIDONE 50 MG/1
150 TABLET ORAL DAILY
Status: DISCONTINUED | OUTPATIENT
Start: 2022-10-20 | End: 2022-10-22

## 2022-10-20 RX ORDER — SODIUM CHLORIDE 0.9 % (FLUSH) 0.9 %
10 SYRINGE (ML) INJECTION EVERY 12 HOURS SCHEDULED
Status: DISCONTINUED | OUTPATIENT
Start: 2022-10-20 | End: 2022-10-26 | Stop reason: HOSPADM

## 2022-10-20 RX ORDER — INSULIN LISPRO 100 [IU]/ML
0-4 INJECTION, SOLUTION INTRAVENOUS; SUBCUTANEOUS NIGHTLY
Status: DISCONTINUED | OUTPATIENT
Start: 2022-10-20 | End: 2022-10-26 | Stop reason: HOSPADM

## 2022-10-20 RX ORDER — SODIUM CHLORIDE 9 MG/ML
INJECTION, SOLUTION INTRAVENOUS CONTINUOUS
Status: ACTIVE | OUTPATIENT
Start: 2022-10-20 | End: 2022-10-21

## 2022-10-20 RX ORDER — PRIMIDONE 50 MG/1
200 TABLET ORAL NIGHTLY
Status: DISCONTINUED | OUTPATIENT
Start: 2022-10-20 | End: 2022-10-26 | Stop reason: HOSPADM

## 2022-10-20 RX ORDER — SODIUM CHLORIDE 0.9 % (FLUSH) 0.9 %
10 SYRINGE (ML) INJECTION PRN
Status: DISCONTINUED | OUTPATIENT
Start: 2022-10-20 | End: 2022-10-21

## 2022-10-20 RX ORDER — ONDANSETRON 2 MG/ML
4 INJECTION INTRAMUSCULAR; INTRAVENOUS EVERY 6 HOURS PRN
Status: DISCONTINUED | OUTPATIENT
Start: 2022-10-20 | End: 2022-10-21

## 2022-10-20 RX ORDER — ONDANSETRON 2 MG/ML
4 INJECTION INTRAMUSCULAR; INTRAVENOUS ONCE
Status: COMPLETED | OUTPATIENT
Start: 2022-10-20 | End: 2022-10-20

## 2022-10-20 RX ADMIN — ATORVASTATIN CALCIUM 40 MG: 40 TABLET, FILM COATED ORAL at 22:10

## 2022-10-20 RX ADMIN — ONDANSETRON 4 MG: 2 INJECTION INTRAMUSCULAR; INTRAVENOUS at 03:28

## 2022-10-20 RX ADMIN — INSULIN GLARGINE 53 UNITS: 100 INJECTION, SOLUTION SUBCUTANEOUS at 10:06

## 2022-10-20 RX ADMIN — NITROGLYCERIN 5 MCG/MIN: 20 INJECTION INTRAVENOUS at 08:14

## 2022-10-20 RX ADMIN — ENOXAPARIN SODIUM 40 MG: 100 INJECTION SUBCUTANEOUS at 09:59

## 2022-10-20 RX ADMIN — MORPHINE SULFATE 2 MG: 4 INJECTION, SOLUTION INTRAMUSCULAR; INTRAVENOUS at 03:41

## 2022-10-20 RX ADMIN — NITROGLYCERIN 5 MCG/MIN: 20 INJECTION INTRAVENOUS at 04:23

## 2022-10-20 RX ADMIN — HEPARIN SODIUM 4000 UNITS: 1000 INJECTION INTRAVENOUS; SUBCUTANEOUS at 14:47

## 2022-10-20 RX ADMIN — NITROGLYCERIN 0.4 MG: 0.4 TABLET, ORALLY DISINTEGRATING SUBLINGUAL at 03:28

## 2022-10-20 RX ADMIN — SODIUM CHLORIDE: 9 INJECTION, SOLUTION INTRAVENOUS at 11:25

## 2022-10-20 RX ADMIN — PRIMIDONE 100 MG: 50 TABLET ORAL at 09:58

## 2022-10-20 RX ADMIN — SODIUM CHLORIDE, PRESERVATIVE FREE 10 ML: 5 INJECTION INTRAVENOUS at 10:00

## 2022-10-20 RX ADMIN — HEPARIN SODIUM 11 UNITS/KG/HR: 10000 INJECTION, SOLUTION INTRAVENOUS at 15:57

## 2022-10-20 RX ADMIN — IOPAMIDOL 60 ML: 612 INJECTION, SOLUTION INTRAVENOUS at 13:34

## 2022-10-20 RX ADMIN — GEMFIBROZIL 600 MG: 600 TABLET ORAL at 09:59

## 2022-10-20 RX ADMIN — GEMFIBROZIL 600 MG: 600 TABLET ORAL at 22:10

## 2022-10-20 RX ADMIN — LEVOTHYROXINE SODIUM 137 MCG: 137 TABLET ORAL at 10:06

## 2022-10-20 RX ADMIN — PRIMIDONE 200 MG: 50 TABLET ORAL at 22:10

## 2022-10-20 RX ADMIN — PERFLUTREN 1.5 ML: 6.52 INJECTION, SUSPENSION INTRAVENOUS at 09:32

## 2022-10-20 RX ADMIN — HEPARIN SODIUM 12 UNITS/KG/HR: 10000 INJECTION, SOLUTION INTRAVENOUS at 14:55

## 2022-10-20 RX ADMIN — SODIUM CHLORIDE, PRESERVATIVE FREE 10 ML: 5 INJECTION INTRAVENOUS at 22:11

## 2022-10-20 ASSESSMENT — ENCOUNTER SYMPTOMS
NAUSEA: 0
SHORTNESS OF BREATH: 1
ABDOMINAL PAIN: 0
VOMITING: 1
VOMITING: 0
DIARRHEA: 0
NAUSEA: 1
ABDOMINAL DISTENTION: 0

## 2022-10-20 ASSESSMENT — PAIN SCALES - GENERAL
PAINLEVEL_OUTOF10: 5

## 2022-10-20 ASSESSMENT — PAIN DESCRIPTION - LOCATION: LOCATION: ABDOMEN

## 2022-10-20 ASSESSMENT — PAIN - FUNCTIONAL ASSESSMENT: PAIN_FUNCTIONAL_ASSESSMENT: 0-10

## 2022-10-20 NOTE — ED NOTES
Pt denies any specific pain she states \" I just feel like crap all over\"     Marilee Mendieta RN  10/20/22 1882

## 2022-10-20 NOTE — ED NOTES
Verbal order from Dr. Verónica Baptiste to give pt 4 mg of Morphine.      Michele Roque RN  10/20/22 1136

## 2022-10-20 NOTE — PROGRESS NOTES
Vascular lab preliminary. Bilateral carotid duplex scan completed. B/L ICA's no significant stenosis. B/l Vertebrals antegrade flow. Bilateral lower extremity vein mapping and marking completed. Right GSV: Zone 1:  3.8mm    Zone 2:  3.8mm    Zone 3:  4.4mm    Zone 4:  4.1mm    Zone 5:  3.9mm    Zone 6:  3.2mm    Zone 7:  3.7mm    Zone 8:  4.2mm    Left GSV: Zone 1:  6.1mm    Zone 2:  3.5mm    Zone 3:  2.9mm    Zone 4:  3.2mm    Zone 5:  3.6mm    Zone 6:  3.1mm    Zone 7:  3.2mm    Zone 8:  4.1mm    Final report pending.

## 2022-10-20 NOTE — ED PROVIDER NOTES
[address] [facility]  eMERGENCY dEPARTMENT eNCOUnter      Pt Name: Magali Means  MRN: [id number]  [place] 1960  Date of evaluation: 10/20/2022  Provider: Almedia Gowers, MD    CHIEF COMPLAINT       Chief Complaint   Patient presents with    Shortness of Breath     Started when pt went to lay down for bed tonight. Pt did have some nausea and one episode of vomiting. Pt is to have a stress test Monday pt denies any CP at this time. HISTORY OF PRESENT ILLNESS   (Location/Symptom, Timing/Onset,Context/Setting, Quality, Duration, Modifying Factors, Severity)  Note limiting factors. Magali Means is a 63 y.o. female who presents to the emergency department for evaluation regarding episodes of shortness of breath and feelings of chest pressure. Patient states that this evening when she went to lay down to go to bed she began to have increasing shortness of breath. This is been associated with some nausea and feelings of tightness across her anterior chest.  States that she has been having episodes of exertional chest discomfort and exertional shortness of breath for the past several months. She was just recently seen in cardiology's office and scheduled for outpatient echocardiogram and dobutamine stress testing. She denies any personal history of coronary artery disease. Does relate a prior family history of coronary artery disease. States that she has not had any fevers or chills. No prior history of pulmonary embolism or DVT. HPI    NursingNotes were reviewed. REVIEW OF SYSTEMS    (2-9 systems for level 4, 10 or more for level 5)     Review of Systems   Constitutional:  Negative for chills and fever. Respiratory:  Positive for shortness of breath. Cardiovascular:  Positive for chest pain. Negative for palpitations and leg swelling. Gastrointestinal:  Negative for abdominal distention, abdominal pain, diarrhea, nausea and vomiting. Neurological:  Negative for dizziness and syncope.    All other systems reviewed and are negative. PAST MEDICALHISTORY     Past Medical History:   Diagnosis Date    Acquired cataract     Diabetes ([address] 2     Essential tremor     Hypercholesteremia          SURGICAL HISTORY     History reviewed. No pertinent surgical history. CURRENT MEDICATIONS     Previous Medications    GEMFIBROZIL (LOPID) 600 MG TABLET    Take 1 tablet by mouth 2 times daily    HUMALOG KWIKPEN 100 UNIT/ML SOPN    INJECT 20 UNITS SUBCUTANEOUSLY THREE TIMES DAILY WITH MEALS    INSULIN GLARGINE (LANTUS) 100 UNIT/ML INJECTION VIAL    Inject 53 Units into the skin every morning    LEVOTHYROXINE (SYNTHROID) 137 MCG TABLET    Take 137 mcg by mouth Daily    PRIMIDONE (MYSOLINE) 50 MG TABLET    Take 3 tablets in the morning and 4 tablets at night. ALLERGIES     Patient has no known allergies. FAMILY HISTORY       Family History   Problem Relation Age of Onset    No Known Problems Mother         2nd hand smoker    Heart Disease Father         smoker    Heart Attack Father          at age 58 and had 4 MIs in the 4 months preceding the last and fatal one    Heart Disease Sister         2nd hand smoker    Heart Attack Sister 32    No Known Problems Brother         2nd hand smoker    No Known Problems Brother         2nd hand smoker    No Known Problems Brother         2nd hand smoker    No Known Problems Brother         2nd hand smoker    No Known Problems Daughter     No Known Problems Daughter     No Known Problems Daughter     No Known Problems Daughter     No Known Problems Daughter         adopted out    No Known Problems Son           SOCIAL HISTORY       Social History     Socioeconomic History    Marital status:       Spouse name: None    Number of children: 6    Years of education: None    Highest education level: None   Occupational History    Occupation:      Comment: retired   Tobacco Use    Smoking status: Never    Smokeless tobacco: Never   Vaping Use    Vaping Use: Never used   Substance and Sexual Activity    Alcohol use: Never    Drug use: Never   Social History Narrative    CODE STATUS: LIMITED CODE - NO INTUBATION, it was discussed that normally needs intubation after CPR nilson survive but pt declines that and is aware that she would be unlikely to have beenfit from CPR then    HEALTH CARE PROXY: her daughter, Mrs. Get Tracy: independently    DOMICILED: has no stairs in the home, has 2 steps to enter the home, lives alone, no pets       SCREENINGS    [place] Coma Scale  Eye Opening: Spontaneous  Best Verbal Response: Oriented  Best Motor Response: Obeys commands  [place] Coma Scale Score: 15        PHYSICAL EXAM    (up to 7 for level 4, 8 or more for level 5)     ED Triage Vitals [10/20/22 0256]   BP Temp Temp Source Heart Rate Resp SpO2 Height Weight   (!) 145/98 97.2 °F (36.2 °C) Temporal (!) 107 23 98 % -- --       Physical Exam  Vitals and nursing note reviewed. HENT:      Head: Atraumatic. Mouth/Throat:      Mouth: Mucous membranes are moist. Mucous membranes are not dry. Eyes:      General: No scleral icterus. Pupils: Pupils are equal, round, and reactive to light. Neck:      Trachea: No tracheal deviation. Cardiovascular:      Rate and Rhythm: Regular rhythm. Tachycardia present. Pulses: Normal pulses. Heart sounds: Normal heart sounds. No murmur heard. Pulmonary:      Effort: Pulmonary effort is normal. No respiratory distress. Breath sounds: Normal breath sounds. No stridor. Abdominal:      General: There is no distension. Palpations: Abdomen is soft. Tenderness: There is no abdominal tenderness. There is no guarding. Musculoskeletal:      Right lower leg: No edema. Left lower leg: No edema. Skin:     Capillary Refill: Capillary refill takes less than 2 seconds. Coloration: Skin is not pale. Findings: No rash. Neurological:      General: No focal deficit present.       Mental Status: She is alert and oriented to person, place, and time. Psychiatric:         Mood and Affect: Mood normal.         Behavior: Behavior is cooperative. DIAGNOSTIC RESULTS     EKG: All EKG's areinterpreted by the Emergency Department Physician who either signs or Co-signs this chart in the absence of a cardiologist.    8065: Sinus tachycardia at a rate of 104, there is no evidence of acute ST elevation identified. There is some T wave inversion identified in leads V5 and V6 with some mild ST depression noted in 1 and aVL. RADIOLOGY:  Non-plain film images such as CT, Ultrasound and MRI are read by the radiologist. Plain radiographic images are visualized and preliminarily interpreted bythe emergency physician with the below findings:      XR CHEST PORTABLE   Final Result   Increased interstitial airspace opacities throughout the lungs bilaterally. Correlation for atypical infection versus mild pulmonary vascular congestion recommended. Electronically signed by Nia Casanova M.D. on 10-20-22 at 5035              LABS:  Labs Reviewed   CBC WITH AUTO DIFFERENTIAL - Abnormal; Notable for the following components:       Result Value    MCHC 32.7 (*)     Neutrophils % 67.3 (*)     All other components within normal limits   COMPREHENSIVE METABOLIC PANEL - Abnormal; Notable for the following components:    CO2 19 (*)     Glucose 302 (*)     Calcium 10.4 (*)     All other components within normal limits   TROPONIN - Abnormal; Notable for the following components:    Troponin 0.04 (*)     All other components within normal limits   BRAIN NATRIURETIC PEPTIDE - Abnormal; Notable for the following components:    Pro-BNP 1,499 (*)     All other components within normal limits   HEMOGLOBIN A1C - Abnormal; Notable for the following components:    Hemoglobin A1C 6.7 (*)     All other components within normal limits   COVID-19, RAPID   MAGNESIUM   PHOSPHORUS   TROPONIN   TROPONIN   TROPONIN   TROPONIN   TSH WITH REFLEX TO FT4   LIPID PANEL       All other labs were within normal range or not returned as of this dictation. EMERGENCY DEPARTMENT COURSE and DIFFERENTIAL DIAGNOSIS/MDM:   Vitals:    Vitals:    10/20/22 0256 10/20/22 0345   BP: (!) 145/98 (!) 143/98   Pulse: (!) 107 100   Resp: 23 15   Temp: 97.2 °F (36.2 °C)    TempSrc: Temporal    SpO2: 98% 95%       MDM    Patient's initial EKG does have some very mild T wave inversion and ST depression identified. Her initial troponin is slightly elevated at 0.04. BNP is also a little bit elevated at about 1500. She seems to have got some relief after sublingual nitro. We will plan to start her on a nitro infusion with plans for admission to the progressive care unit. CONSULTS:    Case was discussed with Dr. Mckinley Figueroa regarding inpatient admission to the hospitalist service. PROCEDURES:  Unless otherwise noted below, none     Procedures    FINAL IMPRESSION      1.  Acute coronary syndrome [facility])          DISPOSITION/PLAN   DISPOSITION Admitted 10/20/2022 04:15:04 AM      (Please note that portions of this note were completed with a voice recognition program.  Efforts were made to edit thedictations but occasionally words are mis-transcribed.)    Jacqui Wong MD (electronically signed)  Attending Emergency Physician          Jacqui Wong MD  10/20/22 7804

## 2022-10-20 NOTE — PROGRESS NOTES
Case discussed with Dr. Chela Harris. I agree that the patient is indicated for urgent CABG. There are CHF symptoms, elevated BNP, reduced LVEF in the setting of multivessel CAD with the RCA showing EH grade 2 flow. This latter finding suggests an imminent risk for STEMI or other major cardiac event without timely intervention. I have discussed with the family and  the patient how I intend on proceeding tomorrow. There was a discussion of the patient's existing DNR. Based on our discussion, we agreed to suspend the DNR as an inherent part of the consent to proceed with CABG. After surgery, if it unexpectedly becomes clear that the patient's prognosis is not good, we will reinstate the DNR. The patient did not wish to assign a POA and wanted the family to discuss this issue as a group. I disclosed this discussion with the patient's bedside nurse.

## 2022-10-20 NOTE — CONSULTS
[facility]) Cardiology   Consult Note       Requesting MD:  Jose Bernal MD   Admit Status:         Patient:    Shoaib Gilmore  [id number]  1960         Chief Complaint: Non-STEMI    HPI: Patient is a 63 y.o. female has been having shortness of breath on moderate exertion since January with no chest pain. Yesterday patient started having nausea and vomiting with more shortness of breath. EKG done at the family practitioner's office showed ischemic changes. She was seen emergency room last night and found to have elevated troponin. At the moment she is comfortable. She is known to have diabetes and hyperlipidemia. Blood pressure was elevated on admission. She is a non-smoker nondrinker. She does have strong family history of early myocardial infarct. Father  age 58 of myocardial infarct. 1 sister had myocardial infarction in her 35s. Review of Systems:  HENNT: normal  PULMONARY: normal   CVS:see history of present illness  ABD: denies any abdominal pain  PERIPHERL: normal  MSK: no swelling of the lower extremities  CNS: Denies any dizziness, syncopal episode or history of stroke  Renal: Denies any history of dysuria or frequency    Cardiac Specific Data:  Specialty Problems          Cardiology Problems    Hypercholesteremia        * (Principal) Atypical chest pain           Past Medical History:  Past Medical History:   Diagnosis Date    Acquired cataract     Diabetes [address] [address] 2     Essential tremor     Hypercholesteremia         Past Surgical History:  History reviewed. No pertinent surgical history.     Past Family History:  Family History   Problem Relation Age of Onset    No Known Problems Mother         2nd hand smoker    Heart Disease Father         smoker    Heart Attack Father          at age 58 and had 4 MIs in the 4 months preceding the last and fatal one    Heart Disease Sister         2nd hand smoker    Heart Attack Sister 32    No Known Problems Brother         2nd hand smoker 137 MCG tablet Take 137 mcg by mouth Daily    Historical Provider, MD   insulin glargine (LANTUS) 100 UNIT/ML injection vial Inject 53 Units into the skin every morning    Historical Provider, MD   HUMALOG KWIKPEN 100 UNIT/ML SOPN INJECT [address] WITH MEALS 8/25/21   Historical Provider, MD   gemfibrozil (LOPID) 600 MG tablet Take 1 tablet by mouth 2 times daily 4/18/21   Historical Provider, MD        Current Facility-Administered Medications   Medication Dose Route Frequency Provider Last Rate Last Admin    nitroGLYCERIN (NITROSTAT) SL tablet 0.4 mg  0.4 mg SubLINGual Q5 Min PRN Joanne Sage MD   0.4 mg at 10/20/22 1324    nitroGLYCERIN 50 mg in dextrose 5% 250 mL infusion  5-200 mcg/min IntraVENous Continuous Amina Caraballo MD 1.5 mL/hr at 10/20/22 0423 5 mcg/min at 10/20/22 0423    primidone (MYSOLINE) tablet 150 mg  150 mg Oral Daily Amina Caraballo MD   100 mg at 10/20/22 6132    primidone (MYSOLINE) tablet 200 mg  200 mg Oral Nightly Amina Caraballo MD        insulin glargine (LANTUS) injection vial 53 Units  53 Units SubCUTAneous QAM Amina Caraballo MD   53 Units at 10/20/22 1006    gemfibrozil (LOPID) tablet 600 mg  600 mg Oral BID Amina Caraballo MD   600 mg at 10/20/22 0959    levothyroxine (SYNTHROID) tablet 137 mcg  137 mcg Oral QAM AC Amina Caraballo MD   137 mcg at 10/20/22 1006    insulin lispro (HUMALOG) injection vial 0-8 Units  0-8 Units SubCUTAneous TID WC Amina Caraballo MD        insulin lispro (HUMALOG) injection vial 0-4 Units  0-4 Units SubCUTAneous Nightly Amina Caraballo MD        glucose chewable tablet 16 g  4 tablet Oral PRN Amina Caraballo MD        dextrose bolus 10% 125 mL  125 mL IntraVENous PRN Amina Caraballo MD        Or    dextrose bolus 10% 250 mL  250 mL IntraVENous PRN Amina Caraballo MD        glucagon (rDNA) injection 1 mg  1 mg SubCUTAneous PRN Amina Caraballo MD        dextrose 10 % infusion   IntraVENous Continuous PRN Amina Caraballo MD sodium chloride flush 0.9 % injection 5-40 mL  5-40 mL IntraVENous 2 times per day Amy Molina MD   10 mL at 10/20/22 1000    sodium chloride flush 0.9 % injection 5-40 mL  5-40 mL IntraVENous PRN Amy Molina MD        0.9 % sodium chloride infusion   IntraVENous PRN Amy Molina MD        ondansetron (ZOFRAN-ODT) disintegrating tablet 4 mg  4 mg Oral Q8H PRN Amy Molina MD        Or    ondansetron [facility]) injection 4 mg  4 mg IntraVENous Q6H PRN Amy Molina MD        acetaminophen (TYLENOL) tablet 650 mg  650 mg Oral Q6H PRN Amy Molina MD        Or    acetaminophen (TYLENOL) suppository 650 mg  650 mg Rectal Q6H PRN MD Meaghan Moniqueela Dmitry ON 10/21/2022] aspirin chewable tablet 81 mg  81 mg Oral Daily Amy Molina MD        atorvastatin (LIPITOR) tablet 40 mg  40 mg Oral Nightly Amy Molina MD        potassium chloride (KLOR-CON M) extended release tablet 40 mEq  40 mEq Oral PRN Amy Molina MD        Or    potassium bicarb-citric acid (EFFER-K) effervescent tablet 40 mEq  40 mEq Oral PRN Amy Molina MD        Or    potassium chloride 10 mEq/100 mL IVPB (Peripheral Line)  10 mEq IntraVENous PRN Amy Molina MD        magnesium sulfate 2000 mg in 50 mL IVPB premix  2,000 mg IntraVENous PRN Amy Molina MD        enoxaparin (LOVENOX) injection 40 mg  40 mg SubCUTAneous Daily mAy Molina MD   40 mg at 10/20/22 0959    0.9 % sodium chloride infusion   IntraVENous Continuous Breegeovany Toney MD            Current Infused Meds:   nitroGLYCERIN 5 mcg/min (10/20/22 0423)    dextrose      sodium chloride      sodium chloride         Physical Exam:  Vitals:    10/20/22 1007   BP: 108/69   Pulse: 89   Resp: 20   Temp: 98 °F (36.7 °C)   SpO2: 99%     No intake or output data in the 24 hours ending 10/20/22 1111  Estimated body mass index is 31.45 kg/m² as calculated from the following:    Height as of 10/18/22: 5' 5\" (1.651 m). Weight as of 10/18/22: 189 lb (85.7 kg).     PHYSICAL EXAM:  HENNT: normal  PULMONARY: normal to inspection, palpation, percussion and ascultation  CVS:Normal neck vein, S1 and S2 normal, no murmur  ABD: liver and spleen not palpable, nontender, bowel sound normal  PERIPHERL: all pulses are normal with no bruit  MSK: no swelling of the lower extremities  CNS: Normal CN 2,3,4,6,5,7,9,10,11,and 12. Oriented to time, place and person    Labs:  Recent Labs     10/20/22  0255   WBC 8.6   HGB 13.6          Recent Labs     10/20/22  0255      K 4.5      CO2 19*   BUN 20   CREATININE 0.8   CALCIUM 10.4*   PHOS 2.6       CK, CKMB, Troponin:   Recent Labs     10/20/22  0255 10/20/22  0653   TROPONINI 0.04* 0.35*       Last 3 BNP:  Recent Labs     10/20/22  0255   PROBNP 1,499*             XR CHEST PORTABLE    Result Date: 10/20/2022  Increased interstitial airspace opacities throughout the lungs bilaterally. Correlation for atypical infection versus mild pulmonary vascular congestion recommended. Electronically signed by Yin Viera M.D. on 10-20-22 at 4410       Assessment and Plan:  Non-STEMI with functional class IV angina, her anginal is no chest pain but shortness of breath nausea and vomiting  Hypertension, diabetes and hyperlipidemia  Strong family history of early myocardial infarct    Plan: We will proceed with cardiac catheterization. The risk and benefit of the procedure have been explained to the patient risks include but not limited to myocardial infarction, cerebral  vascular accident, kidney problem, leg problem, allergy to the dye and possibility of emergency bypass surgery. Patient has given the consent. ASA class: 3, Mallampati class II    I have spent 60 minutes reviewing the chart, taking history, examining the patient, discussion with the patient and the family concerning the finding, diagnoses and treatment plan. This dictation was performed using [address] [place].   Mistake and misspelling may have been created without realizing them. Please notify me for clarification.   Thank you    Rossi Higuera MD   10/20/2022, 11:11 AM

## 2022-10-20 NOTE — ED NOTES
2 mg of Morphine wasted with [facility] - TERESA BAUTISTA, NATHANIEL Manzo RN  10/20/22 8305

## 2022-10-20 NOTE — CONSULTS
[address] [facility] Cardiothoracic Surgery  [address], [place] [address]  Phone: [phone]  Fax: [phone]    Name: Pallavi Cadet  : 1960    DATE: 10/20/2022                                 CARDIOTHORACIC SURGERY CONSULTATION    The patient is a 26-year-old type II diabetic female with a history of obesity hypertension hyperlipidemia and a strong family history of heart disease. She began developing episodes of shortness of breath and weakness about 9 months ago. She was seen in the cardiology office just recently with these complaints and it was arranged for her to come in for elective cardiac catheterization. In the meantime she presented yesterday with severe shortness of breath and nausea and vomiting. On presentation to the emergency department her ECG demonstrated flipped T waves in the anterior wall in addition to slight elevation of her troponin consistent with NSTEMI. She was seen in consultation by Dr. Ruel Long cardiologist who performed cardiac catheterization today. Catheterization demonstrates left ventricle that shows global hypokinesis with ejection fraction of 35%. The left main artery has a distal 50% tapering. The LAD is totally occluded proximally after a small diagonal branch the mid and distal vessel fills by left to left collaterals. This appears to be a chronic total occlusion. Circumflex system has a long proximal 50% stenosis followed by a 60 to 70% stenosis of the first obtuse marginal branch and a 99% subtotal occlusion before larger second obtuse marginal branch. The RCA is a dominant system there is a long complex 90 to 99% subtotal occlusion in the midportion of the vessel with EH II sluggish flow distally. The patient remains without chest pain following the cardiac catheterization. She has never been a cigarette smoker.   Because the presence of severe multivessel coronary disease I was asked to see her to consider surgical revascularization. Past Medical History:   Diagnosis Date    Acquired cataract     Diabetes ([facility] [address] 2     Essential tremor     Hypercholesteremia      History reviewed. No pertinent surgical history.   Current Facility-Administered Medications   Medication Dose Route Frequency Provider Last Rate Last Admin    nitroGLYCERIN (NITROSTAT) SL tablet 0.4 mg  0.4 mg SubLINGual Q5 Min PRN Prosper Garcias MD   0.4 mg at 10/20/22 0328    primidone (MYSOLINE) tablet 150 mg  150 mg Oral Daily Manoj Connolly MD   100 mg at 10/20/22 6386    primidone (MYSOLINE) tablet 200 mg  200 mg Oral Nightly Manoj Connolly MD        insulin glargine (LANTUS) injection vial 53 Units  53 Units SubCUTAneous QAM Manoj Connolly MD   53 Units at 10/20/22 1006    gemfibrozil (LOPID) tablet 600 mg  600 mg Oral BID Manoj Connolly MD   600 mg at 10/20/22 0959    levothyroxine (SYNTHROID) tablet 137 mcg  137 mcg Oral QAM AC Manoj Connolly MD   137 mcg at 10/20/22 1006    insulin lispro (HUMALOG) injection vial 0-8 Units  0-8 Units SubCUTAneous TID WC Manoj Connolly MD        insulin lispro (HUMALOG) injection vial 0-4 Units  0-4 Units SubCUTAneous Nightly Manoj Connolly MD        glucose chewable tablet 16 g  4 tablet Oral PRN Manoj Connolly MD        dextrose bolus 10% 125 mL  125 mL IntraVENous PRN Manoj Connolly MD        Or    dextrose bolus 10% 250 mL  250 mL IntraVENous PRN Manoj Connolly MD        glucagon (rDNA) injection 1 mg  1 mg SubCUTAneous PRN Manoj Connolly MD        dextrose 10 % infusion   IntraVENous Continuous PRN Manoj Connolly MD        sodium chloride flush 0.9 % injection 5-40 mL  5-40 mL IntraVENous 2 times per day Manoj Connolly MD   10 mL at 10/20/22 1000    sodium chloride flush 0.9 % injection 5-40 mL  5-40 mL IntraVENous PRN Manoj Connolly MD        0.9 % sodium chloride infusion   IntraVENous PRN Manoj Connolly MD        ondansetron (ZOFRAN-ODT) disintegrating tablet 4 mg  4 mg Oral Q8H PRN Omari Garnett MD        Or    ondansetron [facility]) injection 4 mg  4 mg IntraVENous Q6H PRN Omari Garnett MD        [START ON 10/21/2022] aspirin chewable tablet 81 mg  81 mg Oral Daily Omari Garnett MD        atorvastatin (LIPITOR) tablet 40 mg  40 mg Oral Nightly Omari Garnett MD        potassium chloride (KLOR-CON M) extended release tablet 40 mEq  40 mEq Oral PRN Omari Garnett MD        Or    potassium bicarb-citric acid (EFFER-K) effervescent tablet 40 mEq  40 mEq Oral PRN Omari Garnett MD        Or    potassium chloride 10 mEq/100 mL IVPB (Peripheral Line)  10 mEq IntraVENous PRN Omari Garnett MD        magnesium sulfate 2000 mg in 50 mL IVPB premix  2,000 mg IntraVENous PRN Omari Garnett MD        sodium chloride flush 0.9 % injection 5-40 mL  5-40 mL IntraVENous 2 times per day Fina Ahuja MD        sodium chloride flush 0.9 % injection 5-40 mL  5-40 mL IntraVENous PRN Fina Ahuja MD        0.9 % sodium chloride infusion   IntraVENous PRN Fina Ahuja MD        0.9 % sodium chloride infusion   IntraVENous Continuous Fina Ahuja MD 50 mL/hr at 10/20/22 1441 Rate Change at 10/20/22 1441    sodium chloride flush 0.9 % injection 5-40 mL  5-40 mL IntraVENous 2 times per day Fina Ahuja MD        sodium chloride flush 0.9 % injection 5-40 mL  5-40 mL IntraVENous PRN Fina Ahuja MD        0.9 % sodium chloride infusion   IntraVENous PRN Fina Ahuja MD        acetaminophen (TYLENOL) tablet 650 mg  650 mg Oral Q4H PRN Fina Ahuja MD        heparin (porcine) injection 4,000 Units  4,000 Units IntraVENous PRLEELA Ahuja MD        heparin (porcine) injection 2,000 Units  2,000 Units IntraVENous PRLEELA Ahuja MD        heparin 25,000 units in dextrose 5% 250 mL (premix) infusion  5-30 Units/kg/hr IntraVENous Continuous Fina Ahuja MD         No Known Allergies  Family History   Problem Relation Age of Onset    No Known Problems Mother         2nd hand smoker    Heart Disease Father         smoker    Heart Attack Father          at age 58 and had 4 MIs in the 4 months preceding the last and fatal one    Heart Disease Sister         2nd hand smoker    Heart Attack Sister 32    No Known Problems Brother         2nd hand smoker    No Known Problems Brother         2nd hand smoker    No Known Problems Brother         2nd hand smoker    No Known Problems Brother         2nd hand smoker    No Known Problems Daughter     No Known Problems Daughter     No Known Problems Daughter     No Known Problems Daughter     No Known Problems Daughter         adopted out    No Known Problems Son      Social History     Socioeconomic History    Marital status:      Spouse name: Not on file    Number of children: 6    Years of education: Not on file    Highest education level: Not on file   Occupational History    Occupation:      Comment: retired   Tobacco Use    Smoking status: Never    Smokeless tobacco: Never   Vaping Use    Vaping Use: Never used   Substance and Sexual Activity    Alcohol use: Never    Drug use: Never    Sexual activity: Not on file     Comment: had 6 kids, 5 survive   Other Topics Concern    Not on file   Social History Narrative    CODE STATUS: LIMITED CODE - NO INTUBATION, it was discussed that normally needs intubation after CPR nilson survive but pt declines that and is aware that she would be unlikely to have beenfit from CPR then    HEALTH CARE PROXY: her daughter, Mrs. Anastasio Primrose: independently    DOMICILED: has no stairs in the home, has 2 steps to enter the home, lives alone, no pets     Social Determinants of Health     Financial Resource Strain: Not on file   Food Insecurity: Not on file   Transportation Needs: Not on file   Physical Activity: Not on file   Stress: Not on file   Social Connections: Not on file   Intimate Partner Violence: Not on file   Housing Stability: Not on file         ROS:   HEENT: denies neck pain, sore throat, blurred vision, diplopia.     Respiratory: chronic shortness of breath, over the past 9 months worse yesterday. Denies any hemoptysis or wheezes. Cardiovascular: denies angina, palpitations, lower extremity edema. GI: denies abdominal pain, nausea, vomiting, constipation. : denies urinary frequency, or dysuria. Musculoskeletal: No joint pain or swelling. Neurologic: denies diplopia, headache, or ataxia. The other 14 systems have been reviewed and are negative    Physical Exam: BP (!) 100/59   Pulse 90   Temp 97.6 °F (36.4 °C) (Temporal)   Resp 24   SpO2 98%   Pleasant middle-age lady who appears comfortable at this time without pain or distress. ENT: Throat is clear., Mucosa clear., Septum intact., No pyorrhea or abscess. Neck: Neck is soft., No cervical masses. , Throat is clear., Neck veins are not distended. , Thyroid is normal size. Respiratory: Clear lung sounds., Normal., No wheezes or rhonchi., No use of accessory muscles. Cardiovascular: Cardiac sounds are clear., Regular rate and rhythm., No murmur., No carotid bruits. , Peripheral pulses are intact., Extremities exhibit no edema or varicosities. Abdomen: Abdomen is soft., No masses or tenderness. , Liver and spleen not palpable., Normal bowel sounds. Lymphatic: No lymphadenopathy in the cervical, axillary, or femoral areas. Musculoskeletal: No clubbing or cyanosis. , Normal muscle tone., Normal range of motion upper and lower extremities. , No joint inflammation or swelling. Skin: No rashes, lesions, or ulcers., No swelling or edema. Neurologic exam: No lateralizing neurologic findings. , Cranial nerves II-XII are normal., Examination of sensation is normal.  Psychiatric: Patient exhibits normal judgement and is oriented to name, time, and place., No anxiety or depression. This 63-year-old type II diabetic has severe multivessel coronary disease with progressive symptoms over the past 9 months.   It appears that she has had a prior anterior wall ischemic event most likely a small infarction as there is occlusion of the LAD that appears to be chronic. Most likely the culprit lesion is the RCA which has a very complex area of stenosis with EH II sluggish flow in the distal vessel. She certainly has significant circumflex disease as well with a subtotally occluded second obtuse marginal branch as well. Considering the severity of her coronary anatomy associated with moderate LV dysfunction with ischemia she is a good candidate for surgical revascularization. I am concerned about the sluggish flow in the RCA and feel that she needs surgical bypass fairly urgently. I will be leaving town tomorrow for the weekend and therefore I have asked Dr. Emmett Cheung to see her to consider placing her on his operating schedule for tomorrow. I discussed this with the patient and her family as well. She appears to be a good candidate for coronary bypass grafting her STS risk score is only 1.5% mortality with a 18% major morbidity mortality.     Electronically signed by Landon Melchor MD on 10/20/2022 at 2:53 PM

## 2022-10-20 NOTE — H&P
[facility] History and Physical    Patient Information:  Patient: Bradley Chatterjee  MRN: [id number]   Acct: [de-identified]  YOB: 1960  Admit Date: 10/20/2022      Date of Service: 10/20/2022  Primary Care Physician: ROB Escudero NP  CODE STATUS: LIMITED CODE - NO INTUBATION, it was discussed that normally needs intubation after CPR nilson survive but pt declines that and is aware that she would be unlikely to have beenfit from CPR then  HEALTH CARE PROXY: her daughter, Mrs. John Dubois:    Chief Complaint   Patient presents with    Shortness of Breath     Started when pt went to lay down for bed tonight. Pt did have some nausea and one episode of vomiting. Pt is to have a stress test Monday pt denies any CP at this time. EP Sign Out:  Atypical Chest pain  Dyspnea    HPI:  Mrs. Bradley Chatterjee is a pleasant 63year old  [place] lady from home. She states that she started to feel sick after taking a meal and that she threw up in the ambulance on the way in. She states that she was still nauseous on arrival but medicine did get rid of the nausea in the ED. She states that she has never been hospitalized before. She had given birth 6 times and adopted one of the children out. She states that there is nothing else that she wants us to know about. Her father had premature CAD but was a smoker. She had a sister have a MI at age 32 years as well however. She has type 2 DM and HLD. Review of Systems:   Review of Systems   Constitutional:  Positive for diaphoresis and fatigue. Respiratory:  Positive for shortness of breath. Cardiovascular:  Negative for chest pain, palpitations and leg swelling. Denied chest pressure   Gastrointestinal:  Positive for nausea and vomiting. Neurological:  Positive for weakness. Negative for syncope and light-headedness. Psychiatric/Behavioral:  Negative for confusion.       Past Medical History: Diagnosis Date    Acquired cataract     Diabetes ([facility] [address] 2     Essential tremor     Hypercholesteremia      Past Psychiatric History:  Denies any    Surgical History:  Denies any  History reviewed. No pertinent surgical history. Social History       Tobacco History       Smoking Status  Never      Smokeless Tobacco Use  Never              Alcohol History       Alcohol Use Status  Never              Drug Use       Drug Use Status  Never              Sexual Activity       Sexually Active  Not Asked Comment  had 6 kids, 5 survive             CODE STATUS: LIMITED CODE - NO INTUBATION, it was discussed that normally needs intubation after CPR nilson survive but pt declines that and is aware that she would be unlikely to have beenfit from CPR then  HEALTH CARE PROXY: her daughter, Mrs. Farooq Lock: independently  DOMICILED: has no stairs in the home, has 2 steps to enter the home, lives alone, no pets     Family History   Problem Relation Age of Onset    No Known Problems Mother         2nd hand smoker    Heart Disease Father         smoker    Heart Attack Father          at age 58 and had 4 MIs in the 4 months preceding the last and fatal one    Heart Disease Sister         2nd hand smoker    Heart Attack Sister 32    No Known Problems Brother         2nd hand smoker    No Known Problems Brother         2nd hand smoker    No Known Problems Brother         2nd hand smoker    No Known Problems Brother         2nd hand smoker    No Known Problems Daughter     No Known Problems Daughter     No Known Problems Daughter     No Known Problems Daughter     No Known Problems Daughter         adopted out    No Known Problems Son      Allergies:   No Known Allergies    Home Medications:  Prior to Admission medications    Medication Sig Start Date End Date Taking? Authorizing Provider   primidone (MYSOLINE) 50 MG tablet Take 3 tablets in the morning and 4 tablets at night.  10/18/22   ROB Dalal levothyroxine (SYNTHROID) 137 MCG tablet Take 137 mcg by mouth Daily    Historical Provider, MD   insulin glargine (LANTUS) 100 UNIT/ML injection vial Inject 53 Units into the skin every morning    Historical Provider, MD   HUMALOG KWIKPEN 100 UNIT/ML SOPN INJECT 20 UNITS SUBCUTANEOUSLY THREE TIMES DAILY WITH MEALS 8/25/21   Historical Provider, MD   gemfibrozil (LOPID) 600 MG tablet Take 1 tablet by mouth 2 times daily 4/18/21   Historical Provider, MD         OBJECTIVE:    Vitals:    10/20/22 0345   BP: (!) 143/98   Pulse: 100   Resp: 15   Temp:    SpO2: 95%   Breathing room air    BP (!) 143/98   Pulse 100   Temp 97.2 °F (36.2 °C) (Temporal)   Resp 15   SpO2 95%     No intake or output data in the 24 hours ending 10/20/22 0431    Physical Exam  Vitals reviewed. Constitutional:       General: She is not in acute distress. Appearance: Normal appearance. She is obese. She is ill-appearing. She is not toxic-appearing. HENT:      Head: Normocephalic and atraumatic. Nose: No congestion or rhinorrhea. Eyes:      General:         Right eye: No discharge. Left eye: No discharge. Neck:      Comments: Supple, trachea appears midline  Cardiovascular:      Rate and Rhythm: Normal rate and regular rhythm. Heart sounds: No murmur heard. No friction rub. No gallop. Pulmonary:      Effort: Pulmonary effort is normal. No respiratory distress. Breath sounds: No stridor. No wheezing, rhonchi or rales. Chest:      Chest wall: No tenderness. Abdominal:      General: Bowel sounds are normal.      Palpations: Abdomen is soft. Tenderness: There is no abdominal tenderness. There is no guarding or rebound. Musculoskeletal:         General: No tenderness or signs of injury. Right lower leg: No edema. Left lower leg: No edema. Skin:     General: Skin is warm.       Comments: nondiaphoretic   Neurological:      Mental Status: She is alert and oriented to person, place, and time.   Psychiatric:         Mood and Affect: Mood normal.         Behavior: Behavior normal.        LABORATORY DATA:    CBC:   Recent Labs     10/20/22  0255   WBC 8.6   HGB 13.6   HCT 41.6        BMP:   Recent Labs     10/20/22  0255      K 4.5      CO2 19*   BUN 20   CREATININE 0.8   CALCIUM 10.4*     Hepatic Profile:   Recent Labs     10/20/22  0255   AST 28   ALT 23   BILITOT 0.3   ALKPHOS 81     Coag Panel: No results for input(s): INR, PROTIME, APTT in the last 72 hours. Cardiac Enzymes:   Recent Labs     10/20/22  0255   TROPONINI 0.04*     Pro-BNP:   Recent Labs     10/20/22  0255   PROBNP 1,499*     A1C: No results for input(s): LABA1C in the last 72 hours. TSH: Invalid input(s): LABTSH  ABG: No results for input(s): PHART, DQC8GWO, PO2ART, OFJ1OPZ, BEART, HGBAE, C3GEZGEI, CARBOXHGBART in the last 72 hours. Urinalysis: No results found for: NITRU, WBCUA, BACTERIA, RBCUA, BLOODU, SPECGRAV, GLUCOSEU    EKG:   No ST changes reported    IMAGING:  No results found.       ASESSMENTS & PLANS:    Patient Active Problem List   Diagnosis    Hypercholesteremia    Atypical chest pain   Dyspnea:  Tele  Admit to cardiac barron as OBSERVATION status patient  Cardio consult in AM  Trend TnI  Mag, Phos, TSH, Lipid Panel, HbA1c - will run as add ons to ED labs if able  CBC and BMP with Reflex for following AM  ASA as per protocol (324-325mg chewed STAT unless on 81ASA daily in which case 162mg chewed STAT if not yet given, THEN from following AM 81mg Daily.)  Statin as per protocol (High intensity Statin, if already on high intensity Stain of Simvasttain 80 continue it, if Lipitor 40+ then Lipitor 80 (if less than 40 just double so long as >=40), if Rosuvastatin 20mg+ then Rosuvastatin 40mg PO QHS (if less than 20 just double so long as >=20mg)  NG SL PRN CP  TTE in AM  NG GGT    Chronic Medical Problems:  Continue current Regimen as indicated  Essential Tremor:   primidone  150 mg Oral Daily primidone  200 mg Oral Nightly   Type 2 DM:   insulin glargine  53 Units SubCUTAneous QAM    gemfibrozil  600 mg Oral BID   Hypothyroidism:   levothyroxine  137 mcg Oral QAM AC     Supoportive and Prophylactic Txx:  DVT Prophylaxis: lovenox SQ  GI (PUD) Ppx: not indicated  PT consult for evalutation and Txx as indicated: contraindicated for now  NPO except sips with meds  nitroGLYCERIN      Care time of >45 minutes  Pt seen/examined and placed to OBServation status. Inpatient status is used for patients with an expected LOS extending past two midnights due to medical therapy and or critical care needs, otherwise patients are placed to OBServation status. Signed:  Electronically signed by Saul Reilly MD on 10/20/22 at 4:47 AM CDT.

## 2022-10-20 NOTE — PLAN OF CARE
Patient admitted this a.m. Writer assumes care of patient this AM.  Patient seen and examined. Doing well. No new complaints. Continues to endorse intermittent chest pain.   Continue current management, including nitroglycerin and heparin gtt   Cardiology consulted on admission  Diagnostic cardiac catheterization-pending

## 2022-10-20 NOTE — PROCEDURES
Cardiac Catherization        Patient Name: Pallavi Cadet   MRN: [id number]   Age: 63 y.o. : 1960   Admission Date: 10/20/2022   Room/Bed: [address] [place] [address]   PRIMARY CARE PROVIDER   ROB White - MARE Marquez MD        DATE OF PROCEDURE: 10/20/2022   INDICATIONS:   This is a 63 y.o. female non-STEMI and functional class 3 angina in terms of shortness of breath on minimal exertion    PROCEDURE:   1. Coronary Angiogram   2. Left heart catheterization   3. Left ventriculogram      ANESTHESIA   Moderate sedation. The patient was continously monitored by the trained experienced nurse under my supervision with respect to level of  consciousness, and vital signs/physiologic status throughout the case. For further details regarding specific medications and doses please refer to  cath lab procedural notes    DESCRIPTION OF PROCEDURE:   Pallavi Cadet was brought to the cath lab in a fasting state after informed consent was obtained. A pre procedural time out occurred to identify correct patient and procedure. Patient was prepped and draped in the usual sterile fashion. Right radial approach was used. Bleeding was stopped with TR band  Routine use of diagnostic catheters were used for angiogram and different projections. FINDINGS:   HEMODYNAMICS:   LVEDP: 12 mmHg   BP: 130/70  HR: 80/min  There was no gradient seen across the aortic valve. Ejection fraction was 35%    Left ventriculogram was performed: Left ventriculogram was performed in the right anterior oblique projection. Left ventricle was normal in size. There was a large apical hypokinesis involving the inferoapical and anteroapical area    Comments: Moderately decreased ejection fraction. Severe wall motion abnormality.   Normal left ventricular end-diastolic pressure    CORONARY ANGIOGRAPHY:   Dominance: Right  Left Main: Long mid to distal left main stenosis compromising lumen probably by 60%  LAD: Left anterior descending was occluded chronically. Mid and distal left anterior descending filled faintly via left to left collateral.  The ramus or high diagonal branch was small in caliber and narrowed 60% proximally over the long tubular segment  LCx: Left circumflex was a large-caliber vessel. It supplied 2 obtuse marginal branch. The first obtuse marginal branch was normal in caliber. The second obtuse marginal branch was a large-caliber vessel and narrowed 90% over a short segment in its midportion. RCA: Right coronary artery was a normal-sized vessel. It had a long tubular stenosis at the midportion compromising the lumen by 95%. There was EH I flow down through the distal right coronary artery. The posterior descending and posterolateral branches were normal in caliber and free of significant disease          ESTIMATED BLOOD LOSS: Minimal  COMPLICATIONS: None         CONCLUSIONS:   Severe left main stenoses  Chronically occluded left anterior descending  Subtotally occluded mid right coronary artery  Severe stenoses of the second obtuse marginal branch  Moderate disease of the diagonal branch  Moderate impairment of left ventricular systolic function    RECOMMENDATIONS:   Patient will be referred for CABG. Dr. Marcela Washington has been notified. IV heparin will be restarted    This dictation was performed using [address]. Mistake and misspelling may have been created without  realizing them. Please notify me for clarification.   Thank you  Norm Mancini MD  10/20/2022, 1:51 PM

## 2022-10-21 ENCOUNTER — APPOINTMENT (OUTPATIENT)
Dept: GENERAL RADIOLOGY | Age: 62
DRG: 234 | End: 2022-10-21

## 2022-10-21 ENCOUNTER — ANESTHESIA EVENT (OUTPATIENT)
Dept: OPERATING ROOM | Age: 62
DRG: 234 | End: 2022-10-21

## 2022-10-21 ENCOUNTER — ANESTHESIA (OUTPATIENT)
Dept: OPERATING ROOM | Age: 62
DRG: 234 | End: 2022-10-21

## 2022-10-21 LAB
ABO/RH: NORMAL
ANION GAP SERPL CALCULATED.3IONS-SCNC: 10 MMOL/L (ref 7–19)
ANION GAP SERPL CALCULATED.3IONS-SCNC: 8 MMOL/L (ref 7–19)
ANTIBODY SCREEN: NORMAL
APTT: 57.4 SEC (ref 26–36.2)
BASE EXCESS ARTERIAL: -0.2 MMOL/L (ref -2–2)
BASE EXCESS ARTERIAL: -1 (ref -3–3)
BASE EXCESS ARTERIAL: -1.5 MMOL/L (ref -2–2)
BASE EXCESS ARTERIAL: -5 (ref -3–3)
BASE EXCESS ARTERIAL: 0 (ref -3–3)
BASE EXCESS ARTERIAL: 0 (ref -3–3)
BASE EXCESS VENOUS: -4
BLOOD BANK DISPENSE STATUS: NORMAL
BLOOD BANK PRODUCT CODE: NORMAL
BPU ID: NORMAL
BUN BLDV-MCNC: 15 MG/DL (ref 8–23)
BUN BLDV-MCNC: 16 MG/DL (ref 8–23)
CALCIUM IONIZED: 1.13 MMOL/L (ref 1.1–1.3)
CALCIUM IONIZED: 1.24 MMOL/L (ref 1.1–1.3)
CALCIUM IONIZED: 1.34 MMOL/L (ref 1.1–1.3)
CALCIUM IONIZED: 1.38 MMOL/L (ref 1.1–1.3)
CALCIUM IONIZED: 1.63 MMOL/L (ref 1.1–1.3)
CALCIUM SERPL-MCNC: 9.2 MG/DL (ref 8.8–10.2)
CALCIUM SERPL-MCNC: 9.3 MG/DL (ref 8.8–10.2)
CARBOXYHEMOGLOBIN ARTERIAL: 1.5 % (ref 0–5)
CARBOXYHEMOGLOBIN ARTERIAL: 1.7 % (ref 0–5)
CHLORIDE BLD-SCNC: 108 MMOL/L (ref 98–111)
CHLORIDE BLD-SCNC: 109 MMOL/L (ref 98–111)
CO2: 20 MMOL/L (ref 22–29)
CO2: 21 MEQ/L (ref 21–32)
CO2: 21 MEQ/L (ref 21–32)
CO2: 24 MEQ/L (ref 21–32)
CO2: 24 MMOL/L (ref 22–29)
CO2: 25 MEQ/L (ref 21–32)
CO2: 26 MEQ/L (ref 21–32)
CREAT SERPL-MCNC: 0.7 MG/DL (ref 0.5–0.9)
CREAT SERPL-MCNC: 0.8 MG/DL (ref 0.5–0.9)
DESCRIPTION BLOOD BANK: NORMAL
FIO2: 100 %
FIO2: 50 %
GFR SERPL CREATININE-BSD FRML MDRD: 57 ML/MIN/{1.73_M2}
GFR SERPL CREATININE-BSD FRML MDRD: 57 ML/MIN/{1.73_M2}
GFR SERPL CREATININE-BSD FRML MDRD: >60 ML/MIN/{1.73_M2}
GLUCOSE BLD-MCNC: 106 MG/DL (ref 70–99)
GLUCOSE BLD-MCNC: 119 MG/DL (ref 70–99)
GLUCOSE BLD-MCNC: 129 MG/DL (ref 70–99)
GLUCOSE BLD-MCNC: 135 MG/DL (ref 70–99)
GLUCOSE BLD-MCNC: 141 MG/DL (ref 70–99)
GLUCOSE BLD-MCNC: 142 MG/DL (ref 74–109)
GLUCOSE BLD-MCNC: 153 MG/DL (ref 70–99)
GLUCOSE BLD-MCNC: 153 MG/DL (ref 70–99)
GLUCOSE BLD-MCNC: 154 MG/DL (ref 70–99)
GLUCOSE BLD-MCNC: 158 MG/DL (ref 70–99)
GLUCOSE BLD-MCNC: 161 MG/DL (ref 70–99)
GLUCOSE BLD-MCNC: 163 MG/DL (ref 70–99)
GLUCOSE BLD-MCNC: 167 MG/DL (ref 74–109)
GLUCOSE BLD-MCNC: 168 MG/DL (ref 70–99)
GLUCOSE BLD-MCNC: 169 MG/DL (ref 70–99)
HCO3 ARTERIAL: 23.7 MMOL/L (ref 22–26)
HCO3 ARTERIAL: 24.8 MMOL/L (ref 22–26)
HCT VFR BLD CALC: 26.5 % (ref 37–47)
HCT VFR BLD CALC: 32.5 % (ref 37–47)
HCT VFR BLD CALC: 36.3 % (ref 37–47)
HEMOGLOBIN, ART, EXTENDED: 10.1 G/DL (ref 12–16)
HEMOGLOBIN, ART, EXTENDED: 9.2 G/DL (ref 12–16)
HEMOGLOBIN: 10.1 GM/DL (ref 12–18)
HEMOGLOBIN: 10.5 GM/DL (ref 12–18)
HEMOGLOBIN: 10.9 G/DL (ref 12–16)
HEMOGLOBIN: 10.9 G/DL (ref 12–16)
HEMOGLOBIN: 11.9 G/DL (ref 12–16)
HEMOGLOBIN: 8.2 GM/DL (ref 12–18)
HEMOGLOBIN: 8.6 GM/DL (ref 12–18)
HEMOGLOBIN: 8.7 GM/DL (ref 12–18)
HEMOGLOBIN: 9 G/DL (ref 12–16)
INR BLD: 1.32 (ref 0.88–1.18)
MAGNESIUM: 1.6 MG/DL (ref 1.6–2.4)
MAGNESIUM: 2.2 MG/DL (ref 1.6–2.4)
MCH RBC QN AUTO: 28.4 PG (ref 27–31)
MCH RBC QN AUTO: 29.3 PG (ref 27–31)
MCHC RBC AUTO-ENTMCNC: 32.8 G/DL (ref 33–37)
MCHC RBC AUTO-ENTMCNC: 34 G/DL (ref 33–37)
MCV RBC AUTO: 86.3 FL (ref 81–99)
MCV RBC AUTO: 86.6 FL (ref 81–99)
MECHANICAL RATE IN BPM: 10
METHEMOGLOBIN ARTERIAL: 1 %
METHEMOGLOBIN ARTERIAL: 1.1 %
MODE: ABNORMAL
MODE: ABNORMAL
MRSA SCREEN RT-PCR: NOT DETECTED
O2 CONTENT ARTERIAL: 13.5 ML/DL
O2 CONTENT ARTERIAL: 14.1 ML/DL
O2 SAT, ARTERIAL: 100 % (ref 93–100)
O2 SAT, ARTERIAL: 97 %
O2 SAT, ARTERIAL: 97.2 %
O2 SAT, VEN: 77 %
O2 THERAPY: ABNORMAL
O2 THERAPY: ABNORMAL
PCO2 ARTERIAL: 34 MM HG (ref 35–48)
PCO2 ARTERIAL: 37 MM HG (ref 35–48)
PCO2 ARTERIAL: 41 MMHG (ref 35–45)
PCO2 ARTERIAL: 41 MMHG (ref 35–45)
PCO2 ARTERIAL: 43 MM HG (ref 35–48)
PCO2 ARTERIAL: 46 MM HG (ref 35–48)
PCO2, VEN: 38.3 MM HG (ref 40–50)
PDW BLD-RTO: 13.2 % (ref 11.5–14.5)
PDW BLD-RTO: 13.4 % (ref 11.5–14.5)
PERFORMED ON: ABNORMAL
PH ARTERIAL: 7.34 (ref 7.3–7.5)
PH ARTERIAL: 7.36 (ref 7.3–7.5)
PH ARTERIAL: 7.37 (ref 7.35–7.45)
PH ARTERIAL: 7.38 (ref 7.3–7.5)
PH ARTERIAL: 7.39 (ref 7.35–7.45)
PH ARTERIAL: 7.45 (ref 7.3–7.5)
PH VENOUS: 7.35
PLATELET # BLD: 167 K/UL (ref 130–400)
PLATELET # BLD: 266 K/UL (ref 130–400)
PMV BLD AUTO: 10.6 FL (ref 9.4–12.3)
PMV BLD AUTO: 11.1 FL (ref 9.4–12.3)
PO2 ARTERIAL: 149 MMHG (ref 80–100)
PO2 ARTERIAL: 233 MM HG (ref 83–108)
PO2 ARTERIAL: 340 MMHG (ref 80–100)
PO2 ARTERIAL: 430 MM HG (ref 83–108)
PO2 ARTERIAL: 526 MM HG (ref 83–108)
PO2 ARTERIAL: 538 MM HG (ref 83–108)
PO2, VEN: 43.5 MM HG
POC ANION GAP: 10
POC ANION GAP: 10
POC ANION GAP: 12
POC ANION GAP: 13
POC ANION GAP: 13
POC CHLORIDE: 105 MEQ/L (ref 99–110)
POC CHLORIDE: 107 MEQ/L (ref 99–110)
POC CHLORIDE: 108 MEQ/L (ref 99–110)
POC CHLORIDE: 109 MEQ/L (ref 99–110)
POC CHLORIDE: 109 MEQ/L (ref 99–110)
POC CREATININE: 0.8 MG/DL (ref 0.3–1.3)
POC CREATININE: 1 MG/DL (ref 0.3–1.3)
POC CREATININE: 1 MG/DL (ref 0.3–1.3)
POC CREATININE: 1.1 MG/DL (ref 0.3–1.3)
POC CREATININE: 1.1 MG/DL (ref 0.3–1.3)
POC HEMATOCRIT: 24 % (ref 37–52)
POC HEMATOCRIT: 25 % (ref 37–52)
POC HEMATOCRIT: 26 % (ref 37–52)
POC HEMATOCRIT: 30 % (ref 37–52)
POC HEMATOCRIT: 31 % (ref 37–52)
POC POTASSIUM: 3.3 MEQ/L (ref 3.5–5.1)
POC POTASSIUM: 3.5 MEQ/L (ref 3.5–5.1)
POC POTASSIUM: 3.5 MEQ/L (ref 3.5–5.1)
POC POTASSIUM: 4.1 MEQ/L (ref 3.5–5.1)
POC POTASSIUM: 4.1 MEQ/L (ref 3.5–5.1)
POC SAMPLE TYPE: ABNORMAL
POC SODIUM: 142 MEQ/L (ref 136–145)
POC SODIUM: 143 MEQ/L (ref 136–145)
POC SODIUM: 144 MEQ/L (ref 136–145)
POSITIVE END EXP PRESS: 5
POSITIVE END EXP PRESS: 5
POTASSIUM REFLEX MAGNESIUM: 4.2 MMOL/L (ref 3.5–5)
POTASSIUM SERPL-SCNC: 3.8 MMOL/L (ref 3.5–5)
POTASSIUM SERPL-SCNC: 4.2 MMOL/L (ref 3.5–5)
POTASSIUM SERPL-SCNC: 4.5 MMOL/L (ref 3.5–5)
POTASSIUM, WHOLE BLOOD: 3.6
POTASSIUM, WHOLE BLOOD: 3.9
PROTHROMBIN TIME: 16.5 SEC (ref 12–14.6)
RBC # BLD: 3.07 M/UL (ref 4.2–5.4)
RBC # BLD: 4.19 M/UL (ref 4.2–5.4)
SAMPLE SOURCE: ABNORMAL
SAMPLE SOURCE: ABNORMAL
SODIUM BLD-SCNC: 138 MMOL/L (ref 136–145)
SODIUM BLD-SCNC: 141 MMOL/L (ref 136–145)
TCO2 ARTERIAL: 22 MMOL/L
TCO2 ARTERIAL: 25 MMOL/L
TCO2 ARTERIAL: 26 MMOL/L
TCO2 ARTERIAL: 27 MMOL/L
TCO2 CALC VENOUS: 22 MMOL/L
VT MECHANICAL: 500 %
WBC # BLD: 6.4 K/UL (ref 4.8–10.8)
WBC # BLD: 8.9 K/UL (ref 4.8–10.8)

## 2022-10-21 PROCEDURE — 2580000003 HC RX 258: Performed by: SURGERY

## 2022-10-21 PROCEDURE — 82374 ASSAY BLOOD CARBON DIOXIDE: CPT

## 2022-10-21 PROCEDURE — 2720000010 HC SURG SUPPLY STERILE: Performed by: SURGERY

## 2022-10-21 PROCEDURE — 82565 ASSAY OF CREATININE: CPT

## 2022-10-21 PROCEDURE — 85018 HEMOGLOBIN: CPT

## 2022-10-21 PROCEDURE — 82800 BLOOD PH: CPT

## 2022-10-21 PROCEDURE — 02100Z9 BYPASS CORONARY ARTERY, ONE ARTERY FROM LEFT INTERNAL MAMMARY, OPEN APPROACH: ICD-10-PCS | Performed by: SURGERY

## 2022-10-21 PROCEDURE — 84132 ASSAY OF SERUM POTASSIUM: CPT

## 2022-10-21 PROCEDURE — 2500000003 HC RX 250 WO HCPCS: Performed by: SURGERY

## 2022-10-21 PROCEDURE — 94640 AIRWAY INHALATION TREATMENT: CPT

## 2022-10-21 PROCEDURE — 33533 CABG ARTERIAL SINGLE: CPT | Performed by: SURGERY

## 2022-10-21 PROCEDURE — 80048 BASIC METABOLIC PNL TOTAL CA: CPT

## 2022-10-21 PROCEDURE — 82435 ASSAY OF BLOOD CHLORIDE: CPT

## 2022-10-21 PROCEDURE — 3600000090 HC PERFUSION PUMP ON: Performed by: SURGERY

## 2022-10-21 PROCEDURE — 85530 HEPARIN-PROTAMINE TOLERANCE: CPT | Performed by: SURGERY

## 2022-10-21 PROCEDURE — 6360000002 HC RX W HCPCS: Performed by: SURGERY

## 2022-10-21 PROCEDURE — 3600000018 HC SURGERY OHS ADDTL 15MIN: Performed by: SURGERY

## 2022-10-21 PROCEDURE — 6370000000 HC RX 637 (ALT 250 FOR IP): Performed by: HOSPITALIST

## 2022-10-21 PROCEDURE — 87641 MR-STAPH DNA AMP PROBE: CPT

## 2022-10-21 PROCEDURE — C1894 INTRO/SHEATH, NON-LASER: HCPCS | Performed by: SURGERY

## 2022-10-21 PROCEDURE — G0378 HOSPITAL OBSERVATION PER HR: HCPCS

## 2022-10-21 PROCEDURE — 37799 UNLISTED PX VASCULAR SURGERY: CPT

## 2022-10-21 PROCEDURE — 2700000000 HC OXYGEN THERAPY PER DAY

## 2022-10-21 PROCEDURE — 33508 ENDOSCOPIC VEIN HARVEST: CPT | Performed by: SURGERY

## 2022-10-21 PROCEDURE — 2709999900 HC NON-CHARGEABLE SUPPLY: Performed by: SURGERY

## 2022-10-21 PROCEDURE — 71045 X-RAY EXAM CHEST 1 VIEW: CPT

## 2022-10-21 PROCEDURE — 36415 COLL VENOUS BLD VENIPUNCTURE: CPT

## 2022-10-21 PROCEDURE — 021209W BYPASS CORONARY ARTERY, THREE ARTERIES FROM AORTA WITH AUTOLOGOUS VENOUS TISSUE, OPEN APPROACH: ICD-10-PCS | Performed by: SURGERY

## 2022-10-21 PROCEDURE — 82803 BLOOD GASES ANY COMBINATION: CPT

## 2022-10-21 PROCEDURE — 84295 ASSAY OF SERUM SODIUM: CPT

## 2022-10-21 PROCEDURE — 86850 RBC ANTIBODY SCREEN: CPT

## 2022-10-21 PROCEDURE — 82330 ASSAY OF CALCIUM: CPT

## 2022-10-21 PROCEDURE — 33967 INSERT I-AORT PERCUT DEVICE: CPT | Performed by: SURGERY

## 2022-10-21 PROCEDURE — 85730 THROMBOPLASTIN TIME PARTIAL: CPT

## 2022-10-21 PROCEDURE — C1729 CATH, DRAINAGE: HCPCS | Performed by: SURGERY

## 2022-10-21 PROCEDURE — 3700000001 HC ADD 15 MINUTES (ANESTHESIA): Performed by: SURGERY

## 2022-10-21 PROCEDURE — 3700000000 HC ANESTHESIA ATTENDED CARE: Performed by: SURGERY

## 2022-10-21 PROCEDURE — 2580000003 HC RX 258: Performed by: ANESTHESIOLOGY

## 2022-10-21 PROCEDURE — 71045 X-RAY EXAM CHEST 1 VIEW: CPT | Performed by: RADIOLOGY

## 2022-10-21 PROCEDURE — 06BQ0ZZ EXCISION OF LEFT SAPHENOUS VEIN, OPEN APPROACH: ICD-10-PCS | Performed by: SURGERY

## 2022-10-21 PROCEDURE — 83735 ASSAY OF MAGNESIUM: CPT

## 2022-10-21 PROCEDURE — B245ZZ4 ULTRASONOGRAPHY OF LEFT HEART, TRANSESOPHAGEAL: ICD-10-PCS | Performed by: SURGERY

## 2022-10-21 PROCEDURE — 85027 COMPLETE CBC AUTOMATED: CPT

## 2022-10-21 PROCEDURE — P9016 RBC LEUKOCYTES REDUCED: HCPCS

## 2022-10-21 PROCEDURE — 86923 COMPATIBILITY TEST ELECTRIC: CPT

## 2022-10-21 PROCEDURE — 3600000008 HC SURGERY OHS BASE: Performed by: SURGERY

## 2022-10-21 PROCEDURE — 6370000000 HC RX 637 (ALT 250 FOR IP): Performed by: SURGERY

## 2022-10-21 PROCEDURE — 2780000010 HC IMPLANT OTHER: Performed by: SURGERY

## 2022-10-21 PROCEDURE — A4217 STERILE WATER/SALINE, 500 ML: HCPCS | Performed by: SURGERY

## 2022-10-21 PROCEDURE — 33519 CABG ARTERY-VEIN THREE: CPT | Performed by: SURGERY

## 2022-10-21 PROCEDURE — 82810 BLOOD GASES O2 SAT ONLY: CPT

## 2022-10-21 PROCEDURE — 6370000000 HC RX 637 (ALT 250 FOR IP): Performed by: NURSE ANESTHETIST, CERTIFIED REGISTERED

## 2022-10-21 PROCEDURE — 6360000002 HC RX W HCPCS: Performed by: ANESTHESIOLOGY

## 2022-10-21 PROCEDURE — 85610 PROTHROMBIN TIME: CPT

## 2022-10-21 PROCEDURE — 86900 BLOOD TYPING SEROLOGIC ABO: CPT

## 2022-10-21 PROCEDURE — 85014 HEMATOCRIT: CPT

## 2022-10-21 PROCEDURE — 2500000003 HC RX 250 WO HCPCS: Performed by: NURSE ANESTHETIST, CERTIFIED REGISTERED

## 2022-10-21 PROCEDURE — 82947 ASSAY GLUCOSE BLOOD QUANT: CPT

## 2022-10-21 PROCEDURE — 6360000002 HC RX W HCPCS: Performed by: NURSE ANESTHETIST, CERTIFIED REGISTERED

## 2022-10-21 PROCEDURE — 86901 BLOOD TYPING SEROLOGIC RH(D): CPT

## 2022-10-21 PROCEDURE — 4A023N7 MEASUREMENT OF CARDIAC SAMPLING AND PRESSURE, LEFT HEART, PERCUTANEOUS APPROACH: ICD-10-PCS | Performed by: SURGERY

## 2022-10-21 PROCEDURE — 2580000003 HC RX 258: Performed by: NURSE ANESTHETIST, CERTIFIED REGISTERED

## 2022-10-21 PROCEDURE — 36430 TRANSFUSION BLD/BLD COMPNT: CPT

## 2022-10-21 PROCEDURE — 85347 COAGULATION TIME ACTIVATED: CPT | Performed by: SURGERY

## 2022-10-21 PROCEDURE — C1751 CATH, INF, PER/CENT/MIDLINE: HCPCS | Performed by: SURGERY

## 2022-10-21 DEVICE — DISK-SHAPED STYLE, SILICONE (1 PER STERILE PKG)
Type: IMPLANTABLE DEVICE | Site: HEART | Status: FUNCTIONAL
Brand: SCANLAN® RADIOMARK® GRAFT MARKERS

## 2022-10-21 RX ORDER — MEPERIDINE HYDROCHLORIDE 50 MG/ML
25 INJECTION INTRAMUSCULAR; INTRAVENOUS; SUBCUTANEOUS
Status: DISCONTINUED | OUTPATIENT
Start: 2022-10-21 | End: 2022-10-21

## 2022-10-21 RX ORDER — LIDOCAINE HYDROCHLORIDE 10 MG/ML
1 INJECTION, SOLUTION EPIDURAL; INFILTRATION; INTRACAUDAL; PERINEURAL
Status: DISCONTINUED | OUTPATIENT
Start: 2022-10-21 | End: 2022-10-21 | Stop reason: HOSPADM

## 2022-10-21 RX ORDER — SODIUM CHLORIDE, SODIUM LACTATE, POTASSIUM CHLORIDE, CALCIUM CHLORIDE 600; 310; 30; 20 MG/100ML; MG/100ML; MG/100ML; MG/100ML
INJECTION, SOLUTION INTRAVENOUS CONTINUOUS
Status: DISCONTINUED | OUTPATIENT
Start: 2022-10-21 | End: 2022-10-21

## 2022-10-21 RX ORDER — HEPARIN SODIUM 1000 [USP'U]/ML
INJECTION, SOLUTION INTRAVENOUS; SUBCUTANEOUS PRN
Status: DISCONTINUED | OUTPATIENT
Start: 2022-10-21 | End: 2022-10-21 | Stop reason: SDUPTHER

## 2022-10-21 RX ORDER — ONDANSETRON 2 MG/ML
4 INJECTION INTRAMUSCULAR; INTRAVENOUS EVERY 6 HOURS PRN
Status: DISCONTINUED | OUTPATIENT
Start: 2022-10-21 | End: 2022-10-26 | Stop reason: HOSPADM

## 2022-10-21 RX ORDER — MEPERIDINE HYDROCHLORIDE 25 MG/ML
25 INJECTION INTRAMUSCULAR; INTRAVENOUS; SUBCUTANEOUS
Status: ACTIVE | OUTPATIENT
Start: 2022-10-21 | End: 2022-10-22

## 2022-10-21 RX ORDER — DIPHENHYDRAMINE HYDROCHLORIDE 50 MG/ML
12.5 INJECTION INTRAMUSCULAR; INTRAVENOUS
Status: ACTIVE | OUTPATIENT
Start: 2022-10-21 | End: 2022-10-22

## 2022-10-21 RX ORDER — OXYCODONE HYDROCHLORIDE 5 MG/1
10 TABLET ORAL EVERY 4 HOURS PRN
Status: DISCONTINUED | OUTPATIENT
Start: 2022-10-21 | End: 2022-10-26 | Stop reason: HOSPADM

## 2022-10-21 RX ORDER — MAGNESIUM SULFATE IN WATER 40 MG/ML
2000 INJECTION, SOLUTION INTRAVENOUS PRN
Status: DISCONTINUED | OUTPATIENT
Start: 2022-10-21 | End: 2022-10-26 | Stop reason: HOSPADM

## 2022-10-21 RX ORDER — MEPERIDINE HYDROCHLORIDE 25 MG/ML
12.5 INJECTION INTRAMUSCULAR; INTRAVENOUS; SUBCUTANEOUS EVERY 5 MIN PRN
Status: DISCONTINUED | OUTPATIENT
Start: 2022-10-21 | End: 2022-10-23 | Stop reason: SDUPTHER

## 2022-10-21 RX ORDER — SODIUM CHLORIDE 9 MG/ML
INJECTION, SOLUTION INTRAVENOUS PRN
Status: DISCONTINUED | OUTPATIENT
Start: 2022-10-21 | End: 2022-10-23 | Stop reason: SDUPTHER

## 2022-10-21 RX ORDER — MORPHINE SULFATE 2 MG/ML
2 INJECTION, SOLUTION INTRAMUSCULAR; INTRAVENOUS
Status: DISCONTINUED | OUTPATIENT
Start: 2022-10-21 | End: 2022-10-26 | Stop reason: HOSPADM

## 2022-10-21 RX ORDER — CHLORHEXIDINE GLUCONATE 0.12 MG/ML
15 RINSE ORAL ONCE
Status: DISCONTINUED | OUTPATIENT
Start: 2022-10-21 | End: 2022-10-21 | Stop reason: HOSPADM

## 2022-10-21 RX ORDER — ONDANSETRON 2 MG/ML
4 INJECTION INTRAMUSCULAR; INTRAVENOUS
Status: ACTIVE | OUTPATIENT
Start: 2022-10-21 | End: 2022-10-22

## 2022-10-21 RX ORDER — INSULIN LISPRO 100 [IU]/ML
0-6 INJECTION, SOLUTION INTRAVENOUS; SUBCUTANEOUS
Status: DISCONTINUED | OUTPATIENT
Start: 2022-10-21 | End: 2022-10-23

## 2022-10-21 RX ORDER — SODIUM CHLORIDE 9 MG/ML
INJECTION, SOLUTION INTRAVENOUS CONTINUOUS
Status: DISCONTINUED | OUTPATIENT
Start: 2022-10-21 | End: 2022-10-21

## 2022-10-21 RX ORDER — OXYCODONE HYDROCHLORIDE 5 MG/1
5 TABLET ORAL EVERY 4 HOURS PRN
Status: DISCONTINUED | OUTPATIENT
Start: 2022-10-21 | End: 2022-10-26 | Stop reason: HOSPADM

## 2022-10-21 RX ORDER — ATORVASTATIN CALCIUM 20 MG/1
20 TABLET, FILM COATED ORAL NIGHTLY
Status: DISCONTINUED | OUTPATIENT
Start: 2022-10-22 | End: 2022-10-26 | Stop reason: HOSPADM

## 2022-10-21 RX ORDER — MIDAZOLAM HYDROCHLORIDE 1 MG/ML
INJECTION INTRAMUSCULAR; INTRAVENOUS PRN
Status: DISCONTINUED | OUTPATIENT
Start: 2022-10-21 | End: 2022-10-21 | Stop reason: SDUPTHER

## 2022-10-21 RX ORDER — FENTANYL CITRATE 50 UG/ML
50 INJECTION, SOLUTION INTRAMUSCULAR; INTRAVENOUS EVERY 5 MIN PRN
Status: DISCONTINUED | OUTPATIENT
Start: 2022-10-21 | End: 2022-10-23 | Stop reason: SDUPTHER

## 2022-10-21 RX ORDER — ONDANSETRON 2 MG/ML
INJECTION INTRAMUSCULAR; INTRAVENOUS PRN
Status: DISCONTINUED | OUTPATIENT
Start: 2022-10-21 | End: 2022-10-21 | Stop reason: SDUPTHER

## 2022-10-21 RX ORDER — CEFAZOLIN SODIUM 1 G/3ML
INJECTION, POWDER, FOR SOLUTION INTRAMUSCULAR; INTRAVENOUS PRN
Status: DISCONTINUED | OUTPATIENT
Start: 2022-10-21 | End: 2022-10-21 | Stop reason: SDUPTHER

## 2022-10-21 RX ORDER — ZOLPIDEM TARTRATE 5 MG/1
5 TABLET ORAL NIGHTLY PRN
Status: DISCONTINUED | OUTPATIENT
Start: 2022-10-22 | End: 2022-10-26 | Stop reason: HOSPADM

## 2022-10-21 RX ORDER — DEXAMETHASONE SODIUM PHOSPHATE 10 MG/ML
INJECTION, SOLUTION INTRAMUSCULAR; INTRAVENOUS PRN
Status: DISCONTINUED | OUTPATIENT
Start: 2022-10-21 | End: 2022-10-21 | Stop reason: SDUPTHER

## 2022-10-21 RX ORDER — LIDOCAINE HYDROCHLORIDE 10 MG/ML
INJECTION, SOLUTION INFILTRATION; PERINEURAL PRN
Status: DISCONTINUED | OUTPATIENT
Start: 2022-10-21 | End: 2022-10-21 | Stop reason: SDUPTHER

## 2022-10-21 RX ORDER — DEXTROSE MONOHYDRATE 100 MG/ML
INJECTION, SOLUTION INTRAVENOUS CONTINUOUS PRN
Status: DISCONTINUED | OUTPATIENT
Start: 2022-10-21 | End: 2022-10-26 | Stop reason: HOSPADM

## 2022-10-21 RX ORDER — MORPHINE SULFATE 4 MG/ML
4 INJECTION, SOLUTION INTRAMUSCULAR; INTRAVENOUS
Status: DISCONTINUED | OUTPATIENT
Start: 2022-10-21 | End: 2022-10-26 | Stop reason: HOSPADM

## 2022-10-21 RX ORDER — SODIUM CHLORIDE 9 MG/ML
INJECTION, SOLUTION INTRAVENOUS CONTINUOUS
Status: DISCONTINUED | OUTPATIENT
Start: 2022-10-21 | End: 2022-10-22

## 2022-10-21 RX ORDER — PROCHLORPERAZINE EDISYLATE 5 MG/ML
5 INJECTION INTRAMUSCULAR; INTRAVENOUS
Status: ACTIVE | OUTPATIENT
Start: 2022-10-21 | End: 2022-10-22

## 2022-10-21 RX ORDER — IPRATROPIUM BROMIDE AND ALBUTEROL SULFATE 2.5; .5 MG/3ML; MG/3ML
1 SOLUTION RESPIRATORY (INHALATION)
Status: DISCONTINUED | OUTPATIENT
Start: 2022-10-21 | End: 2022-10-26 | Stop reason: HOSPADM

## 2022-10-21 RX ORDER — EPINEPHRINE 1 MG/ML
INJECTION, SOLUTION, CONCENTRATE INTRAVENOUS PRN
Status: DISCONTINUED | OUTPATIENT
Start: 2022-10-21 | End: 2022-10-21 | Stop reason: SDUPTHER

## 2022-10-21 RX ORDER — PROPOFOL 10 MG/ML
INJECTION, EMULSION INTRAVENOUS PRN
Status: DISCONTINUED | OUTPATIENT
Start: 2022-10-21 | End: 2022-10-21 | Stop reason: SDUPTHER

## 2022-10-21 RX ORDER — SODIUM CHLORIDE 0.9 % (FLUSH) 0.9 %
5-40 SYRINGE (ML) INJECTION PRN
Status: DISCONTINUED | OUTPATIENT
Start: 2022-10-21 | End: 2022-10-26 | Stop reason: HOSPADM

## 2022-10-21 RX ORDER — CALCIUM CHLORIDE 100 MG/ML
INJECTION INTRAVENOUS; INTRAVENTRICULAR PRN
Status: DISCONTINUED | OUTPATIENT
Start: 2022-10-21 | End: 2022-10-21 | Stop reason: SDUPTHER

## 2022-10-21 RX ORDER — ONDANSETRON 4 MG/1
4 TABLET, ORALLY DISINTEGRATING ORAL EVERY 8 HOURS PRN
Status: DISCONTINUED | OUTPATIENT
Start: 2022-10-21 | End: 2022-10-26 | Stop reason: HOSPADM

## 2022-10-21 RX ORDER — SODIUM CHLORIDE 9 MG/ML
INJECTION, SOLUTION INTRAVENOUS PRN
Status: DISCONTINUED | OUTPATIENT
Start: 2022-10-21 | End: 2022-10-26 | Stop reason: HOSPADM

## 2022-10-21 RX ORDER — DEXMEDETOMIDINE HYDROCHLORIDE 4 UG/ML
.1-1.5 INJECTION, SOLUTION INTRAVENOUS CONTINUOUS
Status: DISCONTINUED | OUTPATIENT
Start: 2022-10-21 | End: 2022-10-22

## 2022-10-21 RX ORDER — SODIUM CHLORIDE 9 MG/ML
INJECTION, SOLUTION INTRAVENOUS PRN
Status: DISCONTINUED | OUTPATIENT
Start: 2022-10-21 | End: 2022-10-21 | Stop reason: HOSPADM

## 2022-10-21 RX ORDER — FENTANYL CITRATE 50 UG/ML
25 INJECTION, SOLUTION INTRAMUSCULAR; INTRAVENOUS EVERY 5 MIN PRN
Status: DISCONTINUED | OUTPATIENT
Start: 2022-10-21 | End: 2022-10-23 | Stop reason: SDUPTHER

## 2022-10-21 RX ORDER — PROTAMINE SULFATE 10 MG/ML
INJECTION, SOLUTION INTRAVENOUS PRN
Status: DISCONTINUED | OUTPATIENT
Start: 2022-10-21 | End: 2022-10-21 | Stop reason: SDUPTHER

## 2022-10-21 RX ORDER — ROCURONIUM BROMIDE 10 MG/ML
INJECTION, SOLUTION INTRAVENOUS PRN
Status: DISCONTINUED | OUTPATIENT
Start: 2022-10-21 | End: 2022-10-21 | Stop reason: SDUPTHER

## 2022-10-21 RX ORDER — CLOPIDOGREL BISULFATE 75 MG/1
75 TABLET ORAL DAILY
Status: DISCONTINUED | OUTPATIENT
Start: 2022-10-22 | End: 2022-10-26 | Stop reason: HOSPADM

## 2022-10-21 RX ORDER — FENTANYL CITRATE 50 UG/ML
25 INJECTION, SOLUTION INTRAMUSCULAR; INTRAVENOUS
Status: DISCONTINUED | OUTPATIENT
Start: 2022-10-21 | End: 2022-10-21 | Stop reason: HOSPADM

## 2022-10-21 RX ORDER — SODIUM CHLORIDE 0.9 % (FLUSH) 0.9 %
5-40 SYRINGE (ML) INJECTION EVERY 12 HOURS SCHEDULED
Status: DISCONTINUED | OUTPATIENT
Start: 2022-10-21 | End: 2022-10-26 | Stop reason: HOSPADM

## 2022-10-21 RX ORDER — SODIUM CHLORIDE 0.9 % (FLUSH) 0.9 %
5-40 SYRINGE (ML) INJECTION PRN
Status: DISCONTINUED | OUTPATIENT
Start: 2022-10-21 | End: 2022-10-23 | Stop reason: SDUPTHER

## 2022-10-21 RX ORDER — POTASSIUM CHLORIDE 29.8 MG/ML
20 INJECTION INTRAVENOUS PRN
Status: DISCONTINUED | OUTPATIENT
Start: 2022-10-21 | End: 2022-10-26 | Stop reason: HOSPADM

## 2022-10-21 RX ORDER — SUFENTANIL CITRATE 50 UG/ML
INJECTION EPIDURAL; INTRAVENOUS PRN
Status: DISCONTINUED | OUTPATIENT
Start: 2022-10-21 | End: 2022-10-21 | Stop reason: SDUPTHER

## 2022-10-21 RX ORDER — INSULIN LISPRO 100 [IU]/ML
0-3 INJECTION, SOLUTION INTRAVENOUS; SUBCUTANEOUS NIGHTLY
Status: DISCONTINUED | OUTPATIENT
Start: 2022-10-21 | End: 2022-10-26 | Stop reason: HOSPADM

## 2022-10-21 RX ORDER — SODIUM CHLORIDE 0.9 % (FLUSH) 0.9 %
5-40 SYRINGE (ML) INJECTION EVERY 12 HOURS SCHEDULED
Status: DISCONTINUED | OUTPATIENT
Start: 2022-10-21 | End: 2022-10-21 | Stop reason: HOSPADM

## 2022-10-21 RX ORDER — INSULIN GLARGINE 100 [IU]/ML
0.15 INJECTION, SOLUTION SUBCUTANEOUS NIGHTLY
Status: DISCONTINUED | OUTPATIENT
Start: 2022-10-22 | End: 2022-10-26 | Stop reason: HOSPADM

## 2022-10-21 RX ORDER — SODIUM CHLORIDE 0.9 % (FLUSH) 0.9 %
5-40 SYRINGE (ML) INJECTION PRN
Status: DISCONTINUED | OUTPATIENT
Start: 2022-10-21 | End: 2022-10-21 | Stop reason: HOSPADM

## 2022-10-21 RX ORDER — 0.9 % SODIUM CHLORIDE 0.9 %
500 INTRAVENOUS SOLUTION INTRAVENOUS CONTINUOUS PRN
Status: DISCONTINUED | OUTPATIENT
Start: 2022-10-21 | End: 2022-10-23 | Stop reason: SDUPTHER

## 2022-10-21 RX ORDER — SODIUM CHLORIDE 0.9 % (FLUSH) 0.9 %
5-40 SYRINGE (ML) INJECTION EVERY 12 HOURS SCHEDULED
Status: DISCONTINUED | OUTPATIENT
Start: 2022-10-21 | End: 2022-10-23 | Stop reason: SDUPTHER

## 2022-10-21 RX ORDER — PROTAMINE SULFATE 10 MG/ML
50 INJECTION, SOLUTION INTRAVENOUS
Status: ACTIVE | OUTPATIENT
Start: 2022-10-21 | End: 2022-10-22

## 2022-10-21 RX ORDER — MIDAZOLAM HYDROCHLORIDE 2 MG/2ML
2 INJECTION, SOLUTION INTRAMUSCULAR; INTRAVENOUS
Status: DISCONTINUED | OUTPATIENT
Start: 2022-10-21 | End: 2022-10-21 | Stop reason: HOSPADM

## 2022-10-21 RX ORDER — FENTANYL CITRATE 50 UG/ML
50 INJECTION, SOLUTION INTRAMUSCULAR; INTRAVENOUS
Status: DISCONTINUED | OUTPATIENT
Start: 2022-10-21 | End: 2022-10-21 | Stop reason: HOSPADM

## 2022-10-21 RX ORDER — ASPIRIN 81 MG/1
81 TABLET ORAL DAILY
Status: DISCONTINUED | OUTPATIENT
Start: 2022-10-21 | End: 2022-10-26 | Stop reason: HOSPADM

## 2022-10-21 RX ORDER — SODIUM CHLORIDE 9 MG/ML
INJECTION, SOLUTION INTRAVENOUS CONTINUOUS PRN
Status: DISCONTINUED | OUTPATIENT
Start: 2022-10-21 | End: 2022-10-21 | Stop reason: SDUPTHER

## 2022-10-21 RX ADMIN — SUFENTANIL CITRATE 10 MCG: 50 INJECTION, SOLUTION EPIDURAL; INTRAVENOUS at 08:20

## 2022-10-21 RX ADMIN — POTASSIUM CHLORIDE 20 MEQ: 29.8 INJECTION, SOLUTION INTRAVENOUS at 14:02

## 2022-10-21 RX ADMIN — ROCURONIUM BROMIDE 20 MG: 10 INJECTION, SOLUTION INTRAVENOUS at 11:51

## 2022-10-21 RX ADMIN — MORPHINE SULFATE 2 MG: 2 INJECTION, SOLUTION INTRAMUSCULAR; INTRAVENOUS at 17:53

## 2022-10-21 RX ADMIN — IPRATROPIUM BROMIDE AND ALBUTEROL SULFATE 1 AMPULE: 2.5; .5 SOLUTION RESPIRATORY (INHALATION) at 14:13

## 2022-10-21 RX ADMIN — ROCURONIUM BROMIDE 10 MG: 10 INJECTION, SOLUTION INTRAVENOUS at 10:19

## 2022-10-21 RX ADMIN — MIDAZOLAM 1 MG: 1 INJECTION INTRAMUSCULAR; INTRAVENOUS at 11:38

## 2022-10-21 RX ADMIN — MIDAZOLAM 2 MG: 1 INJECTION INTRAMUSCULAR; INTRAVENOUS at 11:18

## 2022-10-21 RX ADMIN — IPRATROPIUM BROMIDE AND ALBUTEROL SULFATE 1 AMPULE: 2.5; .5 SOLUTION RESPIRATORY (INHALATION) at 18:38

## 2022-10-21 RX ADMIN — ROCURONIUM BROMIDE 20 MG: 10 INJECTION, SOLUTION INTRAVENOUS at 11:02

## 2022-10-21 RX ADMIN — SUFENTANIL CITRATE 10 MCG: 50 INJECTION, SOLUTION EPIDURAL; INTRAVENOUS at 09:07

## 2022-10-21 RX ADMIN — SUFENTANIL CITRATE 10 MCG: 50 INJECTION, SOLUTION EPIDURAL; INTRAVENOUS at 08:24

## 2022-10-21 RX ADMIN — SODIUM CHLORIDE, PRESERVATIVE FREE 20 ML: 5 INJECTION INTRAVENOUS at 21:15

## 2022-10-21 RX ADMIN — MIDAZOLAM 1 MG: 1 INJECTION INTRAMUSCULAR; INTRAVENOUS at 11:51

## 2022-10-21 RX ADMIN — CALCIUM CHLORIDE 1 G: 100 INJECTION, SOLUTION INTRAVENOUS at 08:45

## 2022-10-21 RX ADMIN — EPINEPHRINE 1 MCG/MIN: 1 INJECTION INTRAMUSCULAR; INTRAVENOUS; SUBCUTANEOUS at 08:54

## 2022-10-21 RX ADMIN — MORPHINE SULFATE 2 MG: 2 INJECTION, SOLUTION INTRAMUSCULAR; INTRAVENOUS at 22:40

## 2022-10-21 RX ADMIN — MIDAZOLAM 1 MG: 1 INJECTION INTRAMUSCULAR; INTRAVENOUS at 08:23

## 2022-10-21 RX ADMIN — MAGNESIUM SULFATE HEPTAHYDRATE 2000 MG: 40 INJECTION, SOLUTION INTRAVENOUS at 13:58

## 2022-10-21 RX ADMIN — SODIUM CHLORIDE: 9 INJECTION, SOLUTION INTRAVENOUS at 08:43

## 2022-10-21 RX ADMIN — SODIUM CHLORIDE, SODIUM LACTATE, POTASSIUM CHLORIDE, AND CALCIUM CHLORIDE: 600; 310; 30; 20 INJECTION, SOLUTION INTRAVENOUS at 07:55

## 2022-10-21 RX ADMIN — CEFAZOLIN 2000 MG: 2 INJECTION, POWDER, FOR SOLUTION INTRAMUSCULAR; INTRAVENOUS at 16:27

## 2022-10-21 RX ADMIN — MORPHINE SULFATE 2 MG: 2 INJECTION, SOLUTION INTRAMUSCULAR; INTRAVENOUS at 13:53

## 2022-10-21 RX ADMIN — PROTAMINE SULFATE 150 MG: 10 INJECTION, SOLUTION INTRAVENOUS at 11:42

## 2022-10-21 RX ADMIN — LIDOCAINE HYDROCHLORIDE 100 MG: 10 INJECTION, SOLUTION INFILTRATION; PERINEURAL at 08:45

## 2022-10-21 RX ADMIN — SUFENTANIL CITRATE 10 MCG: 50 INJECTION, SOLUTION EPIDURAL; INTRAVENOUS at 08:22

## 2022-10-21 RX ADMIN — SUFENTANIL CITRATE 10 MCG: 50 INJECTION, SOLUTION EPIDURAL; INTRAVENOUS at 11:51

## 2022-10-21 RX ADMIN — POTASSIUM CHLORIDE 20 MEQ: 29.8 INJECTION, SOLUTION INTRAVENOUS at 18:13

## 2022-10-21 RX ADMIN — SUFENTANIL CITRATE 20 MCG: 50 INJECTION, SOLUTION EPIDURAL; INTRAVENOUS at 12:47

## 2022-10-21 RX ADMIN — POTASSIUM CHLORIDE 20 MEQ: 29.8 INJECTION, SOLUTION INTRAVENOUS at 15:23

## 2022-10-21 RX ADMIN — ROCURONIUM BROMIDE 20 MG: 10 INJECTION, SOLUTION INTRAVENOUS at 09:20

## 2022-10-21 RX ADMIN — SUFENTANIL CITRATE 10 MCG: 50 INJECTION, SOLUTION EPIDURAL; INTRAVENOUS at 09:18

## 2022-10-21 RX ADMIN — MIDAZOLAM 1 MG: 1 INJECTION INTRAMUSCULAR; INTRAVENOUS at 08:17

## 2022-10-21 RX ADMIN — SUGAMMADEX 300 MG: 100 INJECTION, SOLUTION INTRAVENOUS at 12:46

## 2022-10-21 RX ADMIN — HEPARIN SODIUM 30000 UNITS: 1000 INJECTION, SOLUTION INTRAVENOUS; SUBCUTANEOUS at 09:36

## 2022-10-21 RX ADMIN — PROPOFOL 80 MG: 10 INJECTION, EMULSION INTRAVENOUS at 08:24

## 2022-10-21 RX ADMIN — OXYCODONE 5 MG: 5 TABLET ORAL at 21:36

## 2022-10-21 RX ADMIN — SUFENTANIL CITRATE 10 MCG: 50 INJECTION, SOLUTION EPIDURAL; INTRAVENOUS at 09:43

## 2022-10-21 RX ADMIN — MIDAZOLAM 1 MG: 1 INJECTION INTRAMUSCULAR; INTRAVENOUS at 09:21

## 2022-10-21 RX ADMIN — SUFENTANIL CITRATE 10 MCG: 50 INJECTION, SOLUTION EPIDURAL; INTRAVENOUS at 10:13

## 2022-10-21 RX ADMIN — SODIUM CHLORIDE, PRESERVATIVE FREE 10 ML: 5 INJECTION INTRAVENOUS at 21:15

## 2022-10-21 RX ADMIN — EPINEPHRINE 10 MCG: 1 INJECTION, SOLUTION, CONCENTRATE INTRAVENOUS at 08:45

## 2022-10-21 RX ADMIN — FENTANYL CITRATE 50 MCG: 50 INJECTION, SOLUTION INTRAMUSCULAR; INTRAVENOUS at 19:14

## 2022-10-21 RX ADMIN — ROCURONIUM BROMIDE 20 MG: 10 INJECTION, SOLUTION INTRAVENOUS at 10:50

## 2022-10-21 RX ADMIN — SODIUM CHLORIDE 1.38 UNITS/HR: 9 INJECTION, SOLUTION INTRAVENOUS at 14:27

## 2022-10-21 RX ADMIN — ROCURONIUM BROMIDE 50 MG: 10 INJECTION, SOLUTION INTRAVENOUS at 08:24

## 2022-10-21 RX ADMIN — SODIUM CHLORIDE 2 UNITS/HR: 9 INJECTION, SOLUTION INTRAVENOUS at 09:08

## 2022-10-21 RX ADMIN — DEXAMETHASONE SODIUM PHOSPHATE 10 MG: 10 INJECTION, SOLUTION INTRAMUSCULAR; INTRAVENOUS at 12:22

## 2022-10-21 RX ADMIN — SODIUM CHLORIDE: 9 INJECTION, SOLUTION INTRAVENOUS at 13:09

## 2022-10-21 RX ADMIN — SODIUM CHLORIDE: 9 INJECTION, SOLUTION INTRAVENOUS at 09:41

## 2022-10-21 RX ADMIN — ONDANSETRON 4 MG: 2 INJECTION INTRAMUSCULAR; INTRAVENOUS at 12:22

## 2022-10-21 RX ADMIN — LEVOTHYROXINE SODIUM 137 MCG: 137 TABLET ORAL at 06:32

## 2022-10-21 RX ADMIN — CEFAZOLIN SODIUM 2 G: 1 INJECTION, POWDER, FOR SOLUTION INTRAMUSCULAR; INTRAVENOUS at 09:05

## 2022-10-21 ASSESSMENT — LIFESTYLE VARIABLES: SMOKING_STATUS: 0

## 2022-10-21 ASSESSMENT — PAIN SCALES - GENERAL
PAINLEVEL_OUTOF10: 0
PAINLEVEL_OUTOF10: 4
PAINLEVEL_OUTOF10: 0
PAINLEVEL_OUTOF10: 6
PAINLEVEL_OUTOF10: 5
PAINLEVEL_OUTOF10: 8
PAINLEVEL_OUTOF10: 2
PAINLEVEL_OUTOF10: 0
PAINLEVEL_OUTOF10: 0

## 2022-10-21 ASSESSMENT — PAIN DESCRIPTION - LOCATION
LOCATION: CHEST
LOCATION: CHEST

## 2022-10-21 ASSESSMENT — PULMONARY FUNCTION TESTS
PIF_VALUE: 23
PIF_VALUE: 23
PIF_VALUE: 22
PIF_VALUE: 25
PIF_VALUE: 22
PIF_VALUE: 23
PIF_VALUE: 10
PIF_VALUE: 10
PIF_VALUE: 27
PIF_VALUE: 24

## 2022-10-21 ASSESSMENT — PAIN DESCRIPTION - FREQUENCY: FREQUENCY: CONTINUOUS

## 2022-10-21 ASSESSMENT — PAIN DESCRIPTION - ONSET: ONSET: GRADUAL

## 2022-10-21 ASSESSMENT — PAIN - FUNCTIONAL ASSESSMENT: PAIN_FUNCTIONAL_ASSESSMENT: PREVENTS OR INTERFERES WITH MANY ACTIVE NOT PASSIVE ACTIVITIES

## 2022-10-21 ASSESSMENT — ENCOUNTER SYMPTOMS
DYSPNEA ACTIVITY LEVEL: AFTER AMBULATING 1 FLIGHT OF STAIRS
SHORTNESS OF BREATH: 1

## 2022-10-21 ASSESSMENT — PAIN DESCRIPTION - PAIN TYPE: TYPE: SURGICAL PAIN

## 2022-10-21 ASSESSMENT — PAIN DESCRIPTION - ORIENTATION: ORIENTATION: MID

## 2022-10-21 ASSESSMENT — PAIN SCALES - WONG BAKER: WONGBAKER_NUMERICALRESPONSE: 0

## 2022-10-21 ASSESSMENT — PAIN DESCRIPTION - DESCRIPTORS: DESCRIPTORS: BURNING

## 2022-10-21 NOTE — PROGRESS NOTES
4 Eyes Skin Assessment    Newton Gan is being assessed upon: Transfer to New Unit    I agree that Vince Baker RN, along with Joey Ring (either 2 RN's or 1 LPN and 1 RN) have performed a thorough Head to Toe Skin Assessment on the patient. ALL assessment sites listed below have been assessed. Areas assessed by both nurses:     [x]   Head, Face, and Ears   [x]   Shoulders, Back, and Chest  [x]   Arms, Elbows, and Hands   [x]   Coccyx, Sacrum, and Ischium  [x]   Legs, Feet, and Heels    Does the Patient have Skin Breakdown?  No    Flo Prevention initiated: Yes  Wound Care Orders initiated: NA    [facility] nurse consulted for Pressure Injury (Stage 3,4, Unstageable, DTI, NWPT, and Complex wounds) and New or Established Ostomies: NA        Primary Nurse eSignature: Martha Pulido RN on 10/21/2022 at 1:11 PM      Co-Signer eSignature: Electronically signed by Randi Vale RN on 10/21/22 at 7:12 PM CDT

## 2022-10-21 NOTE — ANESTHESIA PROCEDURE NOTES
Arterial Line:    An arterial line was placed using ultrasound guidance and surface landmarks, in the pre-op for the following indication(s): continuous blood pressure monitoring and blood sampling needed. A 20 gauge (size), 1 and 3/4 inch (length), Arrow (type) catheter was placed, Seldinger technique used, into the left radial artery and a [employer] Arterial Cannulation Support device was used for positioning, secured by tape, Tegaderm and suture. Anesthesia type: Local  Local infiltration: Injection    Events:  patient tolerated procedure well with no complications and EBL 0mL.     Additional notes:  Biopatch was placed and covered with [id number]/21/2022 7:45 AM  Anesthesiologist: Amira Novak DO  Performed: Anesthesiologist   Preanesthetic Checklist  Completed: patient identified, IV checked, site marked, risks and benefits discussed, surgical/procedural consents, equipment checked, pre-op evaluation, timeout performed, anesthesia consent given, oxygen available and monitors applied/VS acknowledged

## 2022-10-21 NOTE — ANESTHESIA PRE PROCEDURE
Department of Anesthesiology  Preprocedure Note       Name:  Newton Gan   Age:  63 y.o.  :  1960                                          MRN:  [id number]         Date:  10/21/2022      Surgeon: Joyce Tabares):  Andrea Sabillon MD    Procedure: Procedure(s):  CABG CORONARY ARTERY BYPASS Open sternum    Medications prior to admission:   Prior to Admission medications    Medication Sig Start Date End Date Taking? Authorizing Provider   primidone (MYSOLINE) 50 MG tablet Take 3 tablets in the morning and 4 tablets at night.  10/18/22   ROB Benitez   levothyroxine (SYNTHROID) 137 MCG tablet Take 137 mcg by mouth Daily    Historical Provider, MD   insulin glargine (LANTUS) 100 UNIT/ML injection vial Inject 53 Units into the skin every morning    Historical Provider, MD   HUMALOG KWIKPEN 100 UNIT/ML SOPN INJECT [address] WITH MEALS 21   Historical Provider, MD   gemfibrozil (LOPID) 600 MG tablet Take 1 tablet by mouth 2 times daily 21   Historical Provider, MD       Current medications:    Current Facility-Administered Medications   Medication Dose Route Frequency Provider Last Rate Last Admin    [MAR Hold] nitroGLYCERIN (NITROSTAT) SL tablet 0.4 mg  0.4 mg SubLINGual Q5 Min PRN Dianelys Fisher MD   0.4 mg at 10/20/22 0328    [MAR Hold] primidone (MYSOLINE) tablet 150 mg  150 mg Oral Daily Vince Baker MD   100 mg at 10/20/22 0958    [MAR Hold] primidone (MYSOLINE) tablet 200 mg  200 mg Oral Nightly Vince Baker MD   200 mg at 10/20/22 2210    [MAR Hold] insulin glargine (LANTUS) injection vial 53 Units  53 Units SubCUTAneous QAM Vince Baker MD   53 Units at 10/20/22 1006    [MAR Hold] gemfibrozil (LOPID) tablet 600 mg  600 mg Oral BID Vince Baker MD   600 mg at 10/20/22 2210    [MAR Hold] levothyroxine (SYNTHROID) tablet 137 mcg  137 mcg Oral QAM AC Vince Baker MD   137 mcg at 10/21/22 0632    [MAR Hold] insulin lispro (HUMALOG) injection vial 0-8 Units  0-8 Units SubCUTAneous TID WC Jv Summers MD        [facility] Hold] insulin lispro (HUMALOG) injection vial 0-4 Units  0-4 Units SubCUTAneous Nightly Jv Summers MD        [facility] Hold] glucose chewable tablet 16 g  4 tablet Oral PRN Jv Summers MD        [facility] Hold] dextrose bolus 10% 125 mL  125 mL IntraVENous PRN Jv Summers MD        Or   [facility]] dextrose bolus 10% 250 mL  250 mL IntraVENous PRN Jv Summers MD        [facility] Hold] glucagon (rDNA) injection 1 mg  1 mg SubCUTAneous PRN Jv Summers MD        [facility] Hold] dextrose 10 % infusion   IntraVENous Continuous PRN Jv Summers MD        [facility] Hold] sodium chloride flush 0.9 % injection 5-40 mL  5-40 mL IntraVENous 2 times per day Jv Summers MD   10 mL at 10/20/22 1000    [MAR Hold] sodium chloride flush 0.9 % injection 5-40 mL  5-40 mL IntraVENous PRN Jv Summers MD        [facility] Hold] 0.9 % sodium chloride infusion   IntraVENous PRN Jv Summers MD        [facility] Hold] ondansetron (ZOFRAN-ODT) disintegrating tablet 4 mg  4 mg Oral Q8H PRN Jv Summers MD        Or   [facility] Hold] ondansetron [facility]) injection 4 mg  4 mg IntraVENous Q6H PRN Jv Summers MD        [facility] Hold] aspirin chewable tablet 81 mg  81 mg Oral Daily Jv Summers MD        [facility] Hold] atorvastatin (LIPITOR) tablet 40 mg  40 mg Oral Nightly Jv Summers MD   40 mg at 10/20/22 2210    [MAR Hold] potassium chloride (KLOR-CON M) extended release tablet 40 mEq  40 mEq Oral PRN Jv Summers MD        Or   [facility] Hold] potassium bicarb-citric acid (EFFER-K) effervescent tablet 40 mEq  40 mEq Oral PRN Jv Summers MD        Or   [facility] Hold] potassium chloride 10 mEq/100 mL IVPB (Peripheral Line)  10 mEq IntraVENous PRN Jv Summers MD        [facility] Hold] magnesium sulfate 2000 mg in 50 mL IVPB premix  2,000 mg IntraVENous PRN Jv Summers MD        [facility] Hold] sodium chloride flush 0.9 % injection 5-40 mL  5-40 mL IntraVENous 2 times per day Yue Yeager Thom Chandler MD        [facility] Hold] sodium chloride flush 0.9 % injection 5-40 mL  5-40 mL IntraVENous PRN Jimmy Conroy MD        [facility] Hold] 0.9 % sodium chloride infusion   IntraVENous PRN Jimmy Conroy MD        [facility] Hold] 0.9 % sodium chloride infusion   IntraVENous Continuous Jimmy Conroy MD 50 mL/hr at 10/20/22 1441 Rate Change at 10/20/22 1441    [MAR Hold] sodium chloride flush 0.9 % injection 5-40 mL  5-40 mL IntraVENous 2 times per day Jimmy Conroy MD        [facility] Hold] sodium chloride flush 0.9 % injection 5-40 mL  5-40 mL IntraVENous PRN Jimmy Conroy MD        [facility] Hold] 0.9 % sodium chloride infusion   IntraVENous PRN Jimmy Conroy MD        [facility] Hold] acetaminophen (TYLENOL) tablet 650 mg  650 mg Oral Q4H PRN Jimmy Conroy MD        [facility] Hold] heparin (porcine) injection 4,000 Units  4,000 Units IntraVENous PRN Jimmy Conroy MD        [facility] Hold] heparin (porcine) injection 2,000 Units  2,000 Units IntraVENous PRN Jimmy Conroy MD        [facility] Hold] heparin 25,000 units in dextrose 5% 250 mL (premix) infusion  5-30 Units/kg/hr IntraVENous Continuous Jimmy Conroy MD   Stopped at 10/21/22 0651    [MAR Hold] sodium chloride flush 0.9 % injection 10 mL  10 mL IntraVENous 2 times per day Leopold Dickinson, MD   10 mL at 10/20/22 2211    [MAR Hold] sodium chloride flush 0.9 % injection 10 mL  10 mL IntraVENous PRN Leopold Dickinson, MD        [facility] Hold] 0.9 % sodium chloride infusion   IntraVENous PRN Leopold Dickinson, MD           Allergies:  No Known Allergies    Problem List:    Patient Active Problem List   Diagnosis Code    Hypercholesteremia E78.00    Atypical chest pain R07.89    Heart disease I51.9       Past Medical History:        Diagnosis Date    Acquired cataract     Diabetes ([facility] [address] 2     Essential tremor     Hypercholesteremia        Past Surgical History:  History reviewed. No pertinent surgical history.     Social History:    Social History     Tobacco Use    Smoking status: Never    Smokeless tobacco: Never   Substance Use Topics    Alcohol use: Never                                Counseling given: Not Answered      Vital Signs (Current):   Vitals:    10/21/22 0502 10/21/22 0503 10/21/22 0623 10/21/22 0710   BP: 111/73 118/79  118/77   Pulse: 85 80  92   Resp: 16 17 20   Temp: (!) 96.2 °F (35.7 °C) (!) 96.2 °F (35.7 °C)     TempSrc: Temporal Temporal     SpO2: 99% 98%  100%   Weight:   187 lb 6.4 oz (85 kg)    Height:   5' 5\" (1.651 m)                                               BP Readings from Last 3 Encounters:   10/21/22 118/77   10/18/22 128/78   06/11/21 122/72       NPO Status: Time of last liquid consumption: 2350                        Time of last solid consumption: 2350                        Date of last liquid consumption: 10/20/22                        Date of last solid food consumption: 10/20/22    BMI:   Wt Readings from Last 3 Encounters:   10/21/22 187 lb 6.4 oz (85 kg)   10/18/22 189 lb (85.7 kg)   06/11/21 188 lb (85.3 kg)     Body mass index is 31.18 kg/m². CBC:   Lab Results   Component Value Date/Time    WBC 6.4 10/21/2022 05:09 AM    RBC 4.19 10/21/2022 05:09 AM    HGB 11.9 10/21/2022 05:09 AM    HCT 36.3 10/21/2022 05:09 AM    MCV 86.6 10/21/2022 05:09 AM    RDW 13.4 10/21/2022 05:09 AM     10/21/2022 05:09 AM       CMP:   Lab Results   Component Value Date/Time     10/21/2022 05:09 AM    K 4.2 10/21/2022 05:09 AM     10/21/2022 05:09 AM    CO2 20 10/21/2022 05:09 AM    BUN 16 10/21/2022 05:09 AM    CREATININE 0.8 10/21/2022 05:09 AM    LABGLOM >60 10/21/2022 05:09 AM    GLUCOSE 167 10/21/2022 05:09 AM    PROT 8.2 10/20/2022 02:55 AM    CALCIUM 9.3 10/21/2022 05:09 AM    BILITOT 0.3 10/20/2022 02:55 AM    ALKPHOS 81 10/20/2022 02:55 AM    AST 28 10/20/2022 02:55 AM    ALT 23 10/20/2022 02:55 AM       POC Tests:   Recent Labs     10/20/22  2024   POCGLU 213*       Coags:   Lab Results   Component Value Date/Time    APTT 57.4 10/21/2022 05:09 AM       HCG (If Applicable):  No results found for: PREGTESTUR, PREGSERUM, HCG, HCGQUANT     ABGs: No results found for: PHART, PO2ART, FNY5VVY, IWB7BVJ, BEART, R8SMLYYV     Type & Screen (If Applicable):  No results found for: LABABO, LABRH    Drug/Infectious Status (If Applicable):  No results found for: HIV, HEPCAB    COVID-19 Screening (If Applicable):   Lab Results   Component Value Date/Time    COVID19 Not Detected 10/20/2022 03:45 AM           Anesthesia Evaluation  Patient summary reviewed and Nursing notes reviewed no history of anesthetic complications:   Airway: Mallampati: II  TM distance: >3 FB   Neck ROM: full  Mouth opening: > = 3 FB   Dental:          Pulmonary:   (+) shortness of breath: chronic,      (-) sleep apnea and not a current smoker                          ROS comment: CXR:  Impression  Increased interstitial airspace opacities throughout the lungs bilaterally.  Correlation for atypical infection versus mild pulmonary vascular congestion recommended. Electronically signed by Trisha Priest M.D. on 10-20-22 at 1774   Cardiovascular:  Exercise tolerance: no interval change,   (+) past MI:, CHF: diastolic, PARDO: after ambulating 1 flight of stairs, hyperlipidemia    (-) pacemaker, hypertension and  angina    ECG reviewed      Echocardiogram reviewed    Cleared by cardiology     Beta Blocker:  Dose within 24 Hrs      ROS comment: Heart cath:     FINDINGS:   HEMODYNAMICS:   LVEDP: 12 mmHg   BP: 130/70  HR: 80/min  There was no gradient seen across the aortic valve. Ejection fraction was 35%     Left ventriculogram was performed: Left ventriculogram was performed in the right anterior oblique projection. Left ventricle was normal in size. There was a large apical hypokinesis involving the inferoapical and anteroapical area     Comments: Moderately decreased ejection fraction. Severe wall motion abnormality.   Normal left ventricular end-diastolic pressure     CORONARY ANGIOGRAPHY:   Dominance: Right  Left Main: Long mid to distal left main stenosis compromising lumen probably by 60%  LAD: Left anterior descending was occluded chronically. Mid and distal left anterior descending filled faintly via left to left collateral.  The ramus or high diagonal branch was small in caliber and narrowed 60% proximally over the long tubular segment  LCx: Left circumflex was a large-caliber vessel. It supplied 2 obtuse marginal branch. The first obtuse marginal branch was normal in caliber. The second obtuse marginal branch was a large-caliber vessel and narrowed 90% over a short segment in its midportion. RCA: Right coronary artery was a normal-sized vessel. It had a long tubular stenosis at the midportion compromising the lumen by 95%. There was EH I flow down through the distal right coronary artery.   The posterior descending and posterolateral branches were normal in caliber and free of significant disease              ESTIMATED BLOOD LOSS: Minimal  COMPLICATIONS: None            CONCLUSIONS:   Severe left main stenoses  Chronically occluded left anterior descending  Subtotally occluded mid right coronary artery  Severe stenoses of the second obtuse marginal branch  Moderate disease of the diagonal branch  Moderate impairment of left ventricular systolic function       Echo:  Summary   Normal left ventricular size with moderate systolic dysfunction as a   consequence of distal septal and inferior apical hypo-/dyskinesis   -estimated ejection fraction 35 to 40%   Mild concentric left ventricular hypertrophy with probable mild (grade 1)   diastolic dysfunction   Normal right-sided chambers with preserved RV systolic function   Normal left atrial size   IVC dimension and inspiratory motion are normal consistent with normal   right atrial filling pressures   Trace pulmonic insufficiency   Mild tricuspid regurgitation with RVSP estimate 20 mmHg   Tricuspid aortic valve with adequate cusp separation neither stenosis or   insufficiency Moderate thickening of the mitral leaflets with decreased mobility and   mean gradient of 4 mmHg (very mild stenosis) and accompanying mild   regurgitation   Aortic root and ascending segment measure within normal limits   Trace pericardial effusion   Definity contrast utilized to better define endocardial borders        Neuro/Psych:      (-) seizures and CVA           GI/Hepatic/Renal:        (-) GERD, liver disease and no renal disease      ROS comment: No dysphagia. Endo/Other:    (+) DiabetesType II DM, well controlled, using insulin, blood dyscrasia: anemia:., no malignancy/cancer. (-) no malignancy/cancer               Abdominal:             Vascular:     - DVT and PE. Other Findings:           Anesthesia Plan      general     ASA 4       Induction: intravenous. arterial line, CVP, continuous noninvasive hemodynamic monitor, PA catheter, central line and NATO  MIPS: Postoperative opioids intended, Prophylactic antiemetics administered and Postoperative ventilation. Anesthetic plan and risks discussed with patient. Use of blood products discussed with patient whom consented to blood products.                      Mery Puri DO   10/21/2022

## 2022-10-21 NOTE — ANESTHESIA POSTPROCEDURE EVALUATION
Department of Anesthesiology  Postprocedure Note    Patient: Kristina Zaman  MRN: [id number]  YOB: 1960  Date of evaluation: 10/21/2022      Procedure Summary     Date: 10/21/22 Room / Location: [address]    Anesthesia Start: 0549 Anesthesia Stop: 7239    Procedure: OPEN STERNUM CABG CORONARY ARTERY BYPASS X 4 WITH LEFT INTERNAL MAMMARY ARTERY, ENDOSCOPIC VEIN HARVEST WITH PERFUSION. TRANSESOPHAGEAL ECHOCARDIOGRAM. Diagnosis:       Heart disease      (Heart disease [I51.9])    Surgeons: Sheridan Tolentino MD Responsible Provider: ROB Hilton CRNA    Anesthesia Type: general ASA Status: 4          Anesthesia Type: No value filed.     Monet Phase I: Monet Score: 10    Monet Phase II:        Anesthesia Post Evaluation    Patient location during evaluation: ICU  Patient participation: complete - patient cannot participate  Level of consciousness: sedated and ventilated  Pain score: 0  Airway patency: patent  Nausea & Vomiting: no nausea and no vomiting  Complications: no  Cardiovascular status: hemodynamically stable and blood pressure returned to baseline  Respiratory status: acceptable, ventilator and intubated  Hydration status: stable

## 2022-10-21 NOTE — ANESTHESIA PROCEDURE NOTES
Procedure Performed: NATO       Start Time:  10/21/2022 9:00 AM       End Time:      Preanesthesia Checklist:  Patient identified, IV assessed, risks and benefits discussed, monitors and equipment assessed, procedure being performed at surgeon's request and anesthesia consent obtained. General Procedure Information  Diagnostic Indications for Echo:  assessment of ascending aorta, assessment of surgical repair, hemodynamic monitoring and assessment of valve function  Physician Requesting Echo: Jared Santos MD  Location performed:  OR  Intubated  Bite block placed  Heart visualized  Probe Insertion:  Easy  Probe Type:  Mulitplane  Modalities:  2D only and color flow mapping    Echocardiographic and Doppler Measurements    Ventricles    Right Ventricle:  Cavity size normal.  Hypertrophy not present. Thrombus not present. Global function mildly impaired. Left Ventricle:  Cavity size normal.  Hypertrophy not present. Thrombus not present. Global Function moderately impaired. Ejection Fraction 35%. Ventricular Regional Function:  1- Basal Anteroseptal:  hypokinetic  2- Basal Anterior:  hypokinetic  3- Basal Anterolateral:  dyskinetic  4- Basal Inferolateral:  hypokinetic  5- Basal Inferior:  hypokinetic  6- Basal Inferoseptal:  hypokinetic  7- Mid Anteroseptal:  dyskinetic  8- Mid Anterior:  hypokinetic  9- Mid Anterolateral:  hypokinetic  10- Mid Inferolateral:  hypokinetic  11- Mid Inferior:  hypokinetic  12- Mid Inferoseptal:  hypokinetic  13- Apical Anterior:  dyskinetic  14- Apical Lateral:  hypokinetic  15- Apical Inferior:  hypokinetic  16- Apical Septal:  hypokinetic and dyskinetic  17- [place]:  hypokinetic      Valves    Aortic Valve: Annulus normal.  Stenosis not present. Regurgitation none. Leaflets normal.  Leaflet motions normal.      Mitral Valve: Annulus normal.  Stenosis not present. Regurgitation mild. Leaflets normal.  Leaflet motions normal.      Tricuspid Valve:   Annulus normal. Stenosis not present. Regurgitation mild. Leaflets normal.  Leaflet motions normal.    Pulmonic Valve: Annulus normal.  Stenosis not present. Regurgitation none. Aorta    Ascending Aorta:  Size normal.    Aortic Arch:  Size normal.    Descending Aorta:  Size normal.        Atria    Right Atrium:  Size normal.  Spontaneous echo contrast present. Thrombus not present. Tumor not present. Device present. Left Atrium:  Size dilated. Spontaneous echo contrast present. Thrombus not present. Tumor not present. Left atrial appendage normal.      Septa    Atrial Septum:  Intra-atrial septal morphology lipomatous hypertrophy.       Ventricular Septum:  Intra-ventricular septum morphology normal.          Other Findings  Pericardium:  normal      Anesthesia Information  Performed Personally  Anesthesiologist:  Braeden Samuel DO    Post Intervention Follow-up Study  Aortic Function: unchangedMitral Function: unchangedTricuspid Function: unchangedNone

## 2022-10-21 NOTE — ANESTHESIA PROCEDURE NOTES
Central Venous Line:    A central venous line was placed using ultrasound guidance, in the OR for the following indication(s): central venous access and CVP monitoring. 10/21/2022 8:30 AM    Sterility preparation included the following: hand hygiene performed prior to procedure, maximum sterile barriers used and sterile technique used to drape from head to toe. The patient was placed in supine position. The right internal jugular vein was prepped. The site was prepped with Chloraprep. A 9 Fr (size), 11.5 (length), introducer single lumen was placed. During the procedure, the following specific steps were taken: target vein identified, needle advanced into vein and blood aspirated and guidewire advanced into vein. Intravenous verification was obtained by ultrasound and venous blood return. Post insertion care included: all ports aspirated, all ports flushed easily, guidewire removed intact, Biopatch applied, line sutured in place and dressing applied. During the procedure the patient experienced: patient tolerated procedure well with no complications and ectopy on telemetry with guidewire insertion. Insertion site scrubbed per usage guidelines?: Yes  Skin prep agent dried for 3 minutes prior to procedure?:yes  Outcomes: uncomplicated and patient tolerated procedure wellno  Anesthesia type: generalA(n) non-oximetric, 7.5 (size) Pulmonary Artery Catheter (PAC) was placed through the Introducer CVL in the right internal jugular vein. The PAC placement was confirmed by pressure tracing changes and NTAO and secured at 38 cm (depth). The patient experienced the following events during the procedure: dysrhythmias. PA Cath placed?: Yes  Staffing  Performed: Anesthesiologist   Anesthesiologist: Mukesh Alford DO  Preanesthetic Checklist  Completed: patient identified, IV checked, site marked, risks and benefits discussed, surgical/procedural consents, equipment checked, pre-op evaluation, timeout performed, anesthesia consent given, oxygen available, monitors applied/VS acknowledged, fire risk safety assessment completed and verbalized and blood product R/B/A discussed and consented

## 2022-10-21 NOTE — PROGRESS NOTES
Patient wedding ring removed in Pre Op and given to patient daughter in ICU waiting room by this nurse

## 2022-10-21 NOTE — BRIEF OP NOTE
Brief Postoperative Note      Patient: Marii Shi  YOB: 1960  MRN: [id number]    Date of Procedure: 10/21/2022    Pre-Op Diagnosis: Heart disease [I51.9]    Post-Op Diagnosis: Same       Procedure(s):  OPEN STERNUM CABG CORONARY ARTERY BYPASS X 4 WITH LEFT INTERNAL MAMMARY ARTERY, ENDOSCOPIC VEIN HARVEST WITH PERFUSION. TRANSESOPHAGEAL ECHOCARDIOGRAM.    Surgeon(s):  Ella Mccrary MD    Assistant:  First Assistant: Aaron Acevedo    Anesthesia: General    Estimated Blood Loss (mL): 689     Complications: None    Specimens:   * No specimens in log *    Implants:  Implant Name Type Inv. Item Serial No.  Lot No. LRB No. Used Action   MARKER GRFT PATEL DISK LTWT BIOCOMPATIBLE RADPQ DISP - [id number] Vascular grafts MARKER GRFT PATEL DISK LTWT BIOCOMPATIBLE RADPQ DISP  [facility] [id number] N/A 2 Implanted         Drains:   Chest Tube Mediastinal 1 (Active)       Chest Tube Mediastinal 2 (Active)       Urinary Catheter 10/21/22 [employer]-Temperature;2 Way (Active)       Findings: preop PVCs but no fibrillation; good conduits, good targets; flows 30-50 cc/min; LVEF slightly improved postop.     Electronically signed by Ella Mccrary MD on 10/21/2022 at 12:09 PM

## 2022-10-21 NOTE — PROGRESS NOTES
Patient arrived from O.R. accompanied by anesthesia and O.R. staff in critical but stable condition. Patient hooked up to ICU monitors.

## 2022-10-22 ENCOUNTER — APPOINTMENT (OUTPATIENT)
Dept: GENERAL RADIOLOGY | Age: 62
DRG: 234 | End: 2022-10-22

## 2022-10-22 PROBLEM — I25.10 CAD IN NATIVE ARTERY: Status: ACTIVE | Noted: 2022-10-22

## 2022-10-22 PROBLEM — I24.9 ACUTE CORONARY SYNDROME (HCC): Status: ACTIVE | Noted: 2022-10-22

## 2022-10-22 LAB
ANION GAP SERPL CALCULATED.3IONS-SCNC: 11 MMOL/L (ref 7–19)
BUN BLDV-MCNC: 17 MG/DL (ref 8–23)
CALCIUM SERPL-MCNC: 8.8 MG/DL (ref 8.8–10.2)
CHLORIDE BLD-SCNC: 111 MMOL/L (ref 98–111)
CO2: 20 MMOL/L (ref 22–29)
CREAT SERPL-MCNC: 0.6 MG/DL (ref 0.5–0.9)
EKG P AXIS: 72 DEGREES
EKG P AXIS: 80 DEGREES
EKG P-R INTERVAL: 158 MS
EKG P-R INTERVAL: 174 MS
EKG Q-T INTERVAL: 354 MS
EKG Q-T INTERVAL: 416 MS
EKG QRS DURATION: 82 MS
EKG QRS DURATION: 90 MS
EKG QTC CALCULATION (BAZETT): 431 MS
EKG QTC CALCULATION (BAZETT): 463 MS
EKG T AXIS: -104 DEGREES
EKG T AXIS: 177 DEGREES
GFR SERPL CREATININE-BSD FRML MDRD: >60 ML/MIN/{1.73_M2}
GLUCOSE BLD-MCNC: 103 MG/DL (ref 70–99)
GLUCOSE BLD-MCNC: 103 MG/DL (ref 70–99)
GLUCOSE BLD-MCNC: 106 MG/DL (ref 74–109)
GLUCOSE BLD-MCNC: 112 MG/DL (ref 70–99)
GLUCOSE BLD-MCNC: 113 MG/DL (ref 70–99)
GLUCOSE BLD-MCNC: 116 MG/DL (ref 70–99)
GLUCOSE BLD-MCNC: 127 MG/DL (ref 70–99)
GLUCOSE BLD-MCNC: 136 MG/DL (ref 70–99)
GLUCOSE BLD-MCNC: 218 MG/DL (ref 70–99)
GLUCOSE BLD-MCNC: 231 MG/DL (ref 70–99)
GLUCOSE BLD-MCNC: 240 MG/DL (ref 70–99)
GLUCOSE BLD-MCNC: 88 MG/DL (ref 70–99)
HCT VFR BLD CALC: 31.5 % (ref 37–47)
HEMOGLOBIN: 10.3 G/DL (ref 12–16)
INR BLD: 1.16 (ref 0.88–1.18)
MAGNESIUM: 1.9 MG/DL (ref 1.6–2.4)
MCH RBC QN AUTO: 28.6 PG (ref 27–31)
MCHC RBC AUTO-ENTMCNC: 32.7 G/DL (ref 33–37)
MCV RBC AUTO: 87.5 FL (ref 81–99)
PDW BLD-RTO: 13.7 % (ref 11.5–14.5)
PERFORMED ON: ABNORMAL
PERFORMED ON: NORMAL
PLATELET # BLD: 188 K/UL (ref 130–400)
PMV BLD AUTO: 10.3 FL (ref 9.4–12.3)
POTASSIUM SERPL-SCNC: 4.1 MMOL/L (ref 3.5–5)
PROTHROMBIN TIME: 14.8 SEC (ref 12–14.6)
RBC # BLD: 3.6 M/UL (ref 4.2–5.4)
SODIUM BLD-SCNC: 142 MMOL/L (ref 136–145)
WBC # BLD: 10.1 K/UL (ref 4.8–10.8)

## 2022-10-22 PROCEDURE — 82947 ASSAY GLUCOSE BLOOD QUANT: CPT

## 2022-10-22 PROCEDURE — 93010 ELECTROCARDIOGRAM REPORT: CPT | Performed by: INTERNAL MEDICINE

## 2022-10-22 PROCEDURE — 6360000002 HC RX W HCPCS: Performed by: SURGERY

## 2022-10-22 PROCEDURE — 94640 AIRWAY INHALATION TREATMENT: CPT

## 2022-10-22 PROCEDURE — 6370000000 HC RX 637 (ALT 250 FOR IP): Performed by: SURGERY

## 2022-10-22 PROCEDURE — 2000000000 HC ICU R&B

## 2022-10-22 PROCEDURE — 2580000003 HC RX 258: Performed by: ANESTHESIOLOGY

## 2022-10-22 PROCEDURE — 80048 BASIC METABOLIC PNL TOTAL CA: CPT

## 2022-10-22 PROCEDURE — 99024 POSTOP FOLLOW-UP VISIT: CPT | Performed by: SURGERY

## 2022-10-22 PROCEDURE — 85610 PROTHROMBIN TIME: CPT

## 2022-10-22 PROCEDURE — 71045 X-RAY EXAM CHEST 1 VIEW: CPT

## 2022-10-22 PROCEDURE — 85027 COMPLETE CBC AUTOMATED: CPT

## 2022-10-22 PROCEDURE — 6370000000 HC RX 637 (ALT 250 FOR IP): Performed by: HOSPITALIST

## 2022-10-22 PROCEDURE — 97116 GAIT TRAINING THERAPY: CPT

## 2022-10-22 PROCEDURE — 97162 PT EVAL MOD COMPLEX 30 MIN: CPT

## 2022-10-22 PROCEDURE — 2580000003 HC RX 258: Performed by: SURGERY

## 2022-10-22 PROCEDURE — 97110 THERAPEUTIC EXERCISES: CPT

## 2022-10-22 PROCEDURE — 2700000000 HC OXYGEN THERAPY PER DAY

## 2022-10-22 PROCEDURE — 99232 SBSQ HOSP IP/OBS MODERATE 35: CPT | Performed by: INTERNAL MEDICINE

## 2022-10-22 PROCEDURE — 83735 ASSAY OF MAGNESIUM: CPT

## 2022-10-22 RX ORDER — FUROSEMIDE 20 MG/1
20 TABLET ORAL DAILY
Status: DISCONTINUED | OUTPATIENT
Start: 2022-10-22 | End: 2022-10-23

## 2022-10-22 RX ORDER — PRIMIDONE 50 MG/1
150 TABLET ORAL NIGHTLY
Status: DISCONTINUED | OUTPATIENT
Start: 2022-10-22 | End: 2022-10-25

## 2022-10-22 RX ADMIN — IPRATROPIUM BROMIDE AND ALBUTEROL SULFATE 1 AMPULE: 2.5; .5 SOLUTION RESPIRATORY (INHALATION) at 18:17

## 2022-10-22 RX ADMIN — ASPIRIN 81 MG: 81 TABLET, COATED ORAL at 07:50

## 2022-10-22 RX ADMIN — FUROSEMIDE 20 MG: 20 TABLET ORAL at 09:29

## 2022-10-22 RX ADMIN — OXYCODONE 10 MG: 5 TABLET ORAL at 09:26

## 2022-10-22 RX ADMIN — MORPHINE SULFATE 2 MG: 2 INJECTION, SOLUTION INTRAMUSCULAR; INTRAVENOUS at 11:18

## 2022-10-22 RX ADMIN — CEFAZOLIN 2000 MG: 2 INJECTION, POWDER, FOR SOLUTION INTRAMUSCULAR; INTRAVENOUS at 00:38

## 2022-10-22 RX ADMIN — INSULIN LISPRO 1 UNITS: 100 INJECTION, SOLUTION INTRAVENOUS; SUBCUTANEOUS at 19:54

## 2022-10-22 RX ADMIN — MORPHINE SULFATE 2 MG: 2 INJECTION, SOLUTION INTRAMUSCULAR; INTRAVENOUS at 02:56

## 2022-10-22 RX ADMIN — OXYCODONE 5 MG: 5 TABLET ORAL at 13:38

## 2022-10-22 RX ADMIN — IPRATROPIUM BROMIDE AND ALBUTEROL SULFATE 1 AMPULE: 2.5; .5 SOLUTION RESPIRATORY (INHALATION) at 15:18

## 2022-10-22 RX ADMIN — POTASSIUM CHLORIDE 20 MEQ: 29.8 INJECTION, SOLUTION INTRAVENOUS at 11:24

## 2022-10-22 RX ADMIN — MORPHINE SULFATE 2 MG: 2 INJECTION, SOLUTION INTRAMUSCULAR; INTRAVENOUS at 06:59

## 2022-10-22 RX ADMIN — MAGNESIUM SULFATE HEPTAHYDRATE 2000 MG: 40 INJECTION, SOLUTION INTRAVENOUS at 11:21

## 2022-10-22 RX ADMIN — MORPHINE SULFATE 2 MG: 2 INJECTION, SOLUTION INTRAMUSCULAR; INTRAVENOUS at 14:37

## 2022-10-22 RX ADMIN — SODIUM CHLORIDE, PRESERVATIVE FREE 10 ML: 5 INJECTION INTRAVENOUS at 07:51

## 2022-10-22 RX ADMIN — METOPROLOL TARTRATE 25 MG: 25 TABLET, FILM COATED ORAL at 09:29

## 2022-10-22 RX ADMIN — IPRATROPIUM BROMIDE AND ALBUTEROL SULFATE 1 AMPULE: 2.5; .5 SOLUTION RESPIRATORY (INHALATION) at 10:20

## 2022-10-22 RX ADMIN — ATORVASTATIN CALCIUM 20 MG: 20 TABLET, FILM COATED ORAL at 19:45

## 2022-10-22 RX ADMIN — OXYCODONE 5 MG: 5 TABLET ORAL at 01:35

## 2022-10-22 RX ADMIN — IPRATROPIUM BROMIDE AND ALBUTEROL SULFATE 1 AMPULE: 2.5; .5 SOLUTION RESPIRATORY (INHALATION) at 06:13

## 2022-10-22 RX ADMIN — CEFAZOLIN 2000 MG: 2 INJECTION, POWDER, FOR SOLUTION INTRAMUSCULAR; INTRAVENOUS at 16:43

## 2022-10-22 RX ADMIN — INSULIN LISPRO 2 UNITS: 100 INJECTION, SOLUTION INTRAVENOUS; SUBCUTANEOUS at 16:18

## 2022-10-22 RX ADMIN — CLOPIDOGREL BISULFATE 75 MG: 75 TABLET ORAL at 07:51

## 2022-10-22 RX ADMIN — OXYCODONE 5 MG: 5 TABLET ORAL at 05:23

## 2022-10-22 RX ADMIN — CEFAZOLIN 2000 MG: 2 INJECTION, POWDER, FOR SOLUTION INTRAMUSCULAR; INTRAVENOUS at 07:49

## 2022-10-22 RX ADMIN — INSULIN GLARGINE 13 UNITS: 100 INJECTION, SOLUTION SUBCUTANEOUS at 19:54

## 2022-10-22 RX ADMIN — METOPROLOL TARTRATE 25 MG: 25 TABLET, FILM COATED ORAL at 19:45

## 2022-10-22 RX ADMIN — PRIMIDONE 150 MG: 50 TABLET ORAL at 19:58

## 2022-10-22 RX ADMIN — OXYCODONE 5 MG: 5 TABLET ORAL at 17:55

## 2022-10-22 RX ADMIN — MORPHINE SULFATE 2 MG: 2 INJECTION, SOLUTION INTRAMUSCULAR; INTRAVENOUS at 00:37

## 2022-10-22 RX ADMIN — MORPHINE SULFATE 4 MG: 4 INJECTION, SOLUTION INTRAMUSCULAR; INTRAVENOUS at 19:06

## 2022-10-22 ASSESSMENT — PAIN DESCRIPTION - ORIENTATION
ORIENTATION: RIGHT;LEFT
ORIENTATION: LEFT;RIGHT
ORIENTATION: RIGHT;LEFT
ORIENTATION: RIGHT;LEFT
ORIENTATION: LEFT;RIGHT
ORIENTATION: MID
ORIENTATION: LEFT;RIGHT

## 2022-10-22 ASSESSMENT — PAIN SCALES - GENERAL
PAINLEVEL_OUTOF10: 1
PAINLEVEL_OUTOF10: 6
PAINLEVEL_OUTOF10: 6
PAINLEVEL_OUTOF10: 2
PAINLEVEL_OUTOF10: 8
PAINLEVEL_OUTOF10: 0
PAINLEVEL_OUTOF10: 1
PAINLEVEL_OUTOF10: 4
PAINLEVEL_OUTOF10: 4
PAINLEVEL_OUTOF10: 5
PAINLEVEL_OUTOF10: 6
PAINLEVEL_OUTOF10: 2
PAINLEVEL_OUTOF10: 4
PAINLEVEL_OUTOF10: 9
PAINLEVEL_OUTOF10: 5
PAINLEVEL_OUTOF10: 4
PAINLEVEL_OUTOF10: 3

## 2022-10-22 ASSESSMENT — PAIN DESCRIPTION - LOCATION
LOCATION: CHEST;STERNUM
LOCATION: CHEST
LOCATION: CHEST
LOCATION: STERNUM
LOCATION: CHEST;STERNUM

## 2022-10-22 ASSESSMENT — PAIN DESCRIPTION - DESCRIPTORS
DESCRIPTORS: ACHING
DESCRIPTORS: CRUSHING
DESCRIPTORS: ACHING

## 2022-10-22 ASSESSMENT — PAIN SCALES - WONG BAKER: WONGBAKER_NUMERICALRESPONSE: 0

## 2022-10-22 NOTE — PROGRESS NOTES
Progress Note    10/22/2022 8:06 AM          POD#   1    Subjective:  Ms. Alex Buckner was extubated yesterday afternoon. PULSE OXIMETRY RANGE: SpO2  Av.9 %  Min: 90 %  Max: 100 %  SUPPLEMENTAL O2: O2 Flow Rate (L/min): 2 L/min   Vital Signs: /67   Pulse (!) 105   Temp 99.9 °F (37.7 °C) (Core)   Resp (!) 33   Ht 5' 5\" (1.651 m)   Wt 207 lb 14.4 oz (94.3 kg)   SpO2 94%   BMI 34.60 kg/m²    Temperature Range:   Temp: 99.9 °F (37.7 °C)   Temp  Av.2 °F (36.8 °C)  Min: 97.3 °F (36.3 °C)  Max: 99.9 °F (37.7 °C)                   Rhythm: normal sinus rhythm    Labs:   ABG:    Lab Results   Component Value Date/Time    PHART 7.370 10/21/2022 03:21 PM    PO2ART 149.0 10/21/2022 03:21 PM    NEH2PLX 41.0 10/21/2022 03:21 PM    U8ACMYTG 97.0 10/21/2022 03:21 PM    ESX7DDF 25 10/21/2022 11:50 AM    RJW3YHF 23.7 10/21/2022 03:21 PM    BEART -1.5 10/21/2022 03:21 PM     CBC:   Recent Labs     10/21/22  0509 10/21/22  0849 10/21/22  1300 10/21/22  1815 10/21/22  1900 10/22/22  0600   WBC 6.4  --  8.9  --   --  10.1   HGB 11.9*   < > 9.0* 10.9* 10.9* 10.3*   HCT 36.3*  --  26.5*  --  32.5* 31.5*   MCV 86.6  --  86.3  --   --  87.5     --  167  --   --  188    < > = values in this interval not displayed. BMP:   Recent Labs     10/20/22  0255 10/20/22  1421 10/21/22  0509 10/21/22  0849 10/21/22  1150 10/21/22  1300 10/21/22  1304 10/21/22  1700 10/21/22  2109 10/22/22  0600      < > 138  --   --  141  --   --   --  142   K 4.5   < > 4.2  --   --  3.8   < > 4.2 4.5 4.1      < > 108  --   --  109  --   --   --  111   CO2 19*   < > 20*   < > 24 24  --   --   --  20*   PHOS 2.6  --   --   --   --   --   --   --   --   --    BUN 20   < > 16  --   --  15  --   --   --  17   CREATININE 0.8   < > 0.8   < > 1.0 0.7  --   --   --  0.6    < > = values in this interval not displayed.      PT/INR:   Recent Labs     10/21/22  1300 10/22/22  0600   PROTIME 16.5* 14.8*   INR 1.32* 1.16     APTT:   Recent Labs     10/20/22  1421 10/20/22  1949 10/21/22  0509   APTT 94.6* 124.3* 57.4*     Chest X-Ray:  Left lung well expanded, no effusions   CT:  I/O last 3 completed shifts: In: 0567 [P.O.:785; I.V.:3888; Blood:752.5; IV Piggyback:162.5]  Out: 4203 [Urine:3080; Blood:500; Chest Tube:206]      Air Leak:  No air leak  I/O last 3 completed shifts:   In: 5588 [P.O.:785; I.V.:3888; Blood:752.5; IV Piggyback:162.5]  Out: 3786 [Urine:3080; Blood:500; Chest Tube:206]  Scheduled Meds:    sodium chloride flush  5-40 mL IntraVENous 2 times per day    sodium chloride flush  5-40 mL IntraVENous 2 times per day    aspirin  81 mg Oral Daily    clopidogrel  75 mg Oral Daily    atorvastatin  20 mg Oral Nightly    ceFAZolin (ANCEF) IVPB  2,000 mg IntraVENous Q8H    ipratropium-albuterol  1 ampule Inhalation Q4H WA    insulin glargine  0.15 Units/kg SubCUTAneous Nightly    insulin lispro  0-6 Units SubCUTAneous TID WC    insulin lispro  0-3 Units SubCUTAneous Nightly    [MAR Hold] primidone  150 mg Oral Daily    [MAR Hold] primidone  200 mg Oral Nightly    [MAR Hold] insulin glargine  53 Units SubCUTAneous QAM    [MAR Hold] gemfibrozil  600 mg Oral BID    [MAR Hold] levothyroxine  137 mcg Oral QAM AC    [MAR Hold] insulin lispro  0-8 Units SubCUTAneous TID WC    [MAR Hold] insulin lispro  0-4 Units SubCUTAneous Nightly    [MAR Hold] sodium chloride flush  5-40 mL IntraVENous 2 times per day    [MAR Hold] aspirin  81 mg Oral Daily    [MAR Hold] sodium chloride flush  5-40 mL IntraVENous 2 times per day    [facility] Hold] sodium chloride flush  5-40 mL IntraVENous 2 times per day    [facility] Hold] sodium chloride flush  10 mL IntraVENous 2 times per day     Continuous Infusions:    EPINEPHrine infusion Stopped (10/21/22 1016)    sodium chloride      sodium chloride 75 mL/hr at 10/21/22 1309    sodium chloride      sodium chloride      insulin Stopped (10/22/22 0734)    dextrose      sodium chloride      dexmedetomidine HCl in NaCl      [MAR Hold] dextrose      [MAR Hold] heparin (PORCINE) Infusion Stopped (10/21/22 8738)     Exam:   General appearance: NAD  Neck: no bruits, no JVD, No lymph nodes   Lungs: no rhonchi, no wheezes, no rales   Sternum: stable, dressing dry and intact    Heart: S1 and S2 no murmer, + rub  Abdomen: positive bowel sounds, no bruits, no masses  Extremities: warm and dry, no cyanosis, no clubbing  Leg Wounds:  Dressing dry and intact    Assessment  SP CABG x 4 for ischemic cardiomyopathy. Minimal need for inotropic or vasopressor support since the OR. Will remove swan and start b-blocker. Evidence of vtach in the OR prior to revascularization but none postop. Pulmonary status is excellent. She is oxygenating well on 2 lpm O2 by NC. Minimal bleeding. Hct 32 this morning. Chest tube output <300cc since OR. Will remove tubes. Renal function stable. Will start low dose lasix.       Patient Active Problem List   Diagnosis    Hypercholesteremia    Atypical chest pain    Heart disease         MD Jerome Leal MD

## 2022-10-22 NOTE — PROGRESS NOTES
10/22/22 1437   Subjective   Subjective Patient  in chair agrees to walk   Pain Assessment   Pain Level 6   Patient's Stated Pain Goal 0 - No pain   Pain Location Sternum   Pain Orientation Left;Right   Pain Descriptors Aching   Functional Pain Assessment Prevents or interferes some active activities and ADLs   Non-Pharmaceutical Pain Intervention(s) Emotional support   Cognition   Overall Cognitive Status WFL   Orientation   Overall Orientation Status WFL   Vitals   O2 Device Nasal cannula   Comment 2L   Transfer Training   Transfer Training Yes   Sit to Stand Minimum assistance   Stand to Sit Contact-guard assistance   Gait Training   Gait Training Yes   Gait   Overall Level of Assistance Contact-guard assistance   Speed/Kathi Slow   Distance (ft) 225 Feet   Assistive Device Other (comment)  (Pushed WC)   Patient Education   Education Given To Patient   Education Provided Role of Therapy;Plan of Care;Transfer Training; Fall Prevention Strategies   Education Provided Comments sternal prec. Education Method Verbal   Barriers to Learning None   Education Outcome Verbalized understanding;Continued education needed   Other Specialty Interventions   Other Treatments/Modalities Patient back in chair all needs in reach.    Assessment   Activity Tolerance Patient tolerated treatment well   PT Plan of Care   Saturday X             Electronically signed by Siddharth Tay PTA on 10/22/2022 at 2:50 PM

## 2022-10-22 NOTE — PROGRESS NOTES
Cardiology Daily Note Eduardo Villalobos MD      Patient:  Newton Gan  [id number]    Patient Active Problem List    Diagnosis Date Noted    Atypical chest pain 10/20/2022    Hypercholesteremia 10/18/2022    Heart disease 10/20/2022       Admit Date:  10/20/2022    Admission Problem List: Present on Admission:   Atypical chest pain      Cardiac Specific Data:  Specialty Problems          Cardiology Problems    Hypercholesteremia        * (Principal) Atypical chest pain        Heart disease           Subjective:  Ms. Nix Face seen today postop day 1 following CABG x4. Transitioning care from Dr. Jacob Boyd to me at this time. Sitting up in the bed resting comfortably denies chest pain denies dyspnea sinus rhythm on the monitor no drips chest tubes to be removed today. Discussed with nursing staff laboratory studies reviewed. Blood pressure 109/75 heart rate 100. Dr. Santana Inch note reviewed. Objective:   /70   Pulse 99   Temp 97.5 °F (36.4 °C) (Temporal)   Resp 23   Ht 5' 5\" (1.651 m)   Wt 207 lb 14.4 oz (94.3 kg)   SpO2 94%   BMI 34.60 kg/m²       Intake/Output Summary (Last 24 hours) at 10/22/2022 1028  Last data filed at 10/22/2022 0800  Gross per 24 hour   Intake 4662.97 ml   Output 2748 ml   Net 1914.97 ml       Prior to Admission medications    Medication Sig Start Date End Date Taking? Authorizing Provider   primidone (MYSOLINE) 50 MG tablet Take 3 tablets in the morning and 4 tablets at night.  10/18/22   ROB Benitez   levothyroxine (SYNTHROID) 137 MCG tablet Take 137 mcg by mouth Daily    Historical Provider, MD   insulin glargine (LANTUS) 100 UNIT/ML injection vial Inject 53 Units into the skin every morning    Historical Provider, MD   HUMALOG KWIKPEN 100 UNIT/ML SOPN INJECT 20 UNITS SUBCUTANEOUSLY THREE TIMES DAILY WITH MEALS 8/25/21   Historical Provider, MD   gemfibrozil (LOPID) 600 MG tablet Take 1 tablet by mouth 2 times daily 4/18/21   Historical Provider, MD        metoprolol tartrate  25 mg Oral BID    furosemide  20 mg Oral Daily    sodium chloride flush  5-40 mL IntraVENous 2 times per day    sodium chloride flush  5-40 mL IntraVENous 2 times per day    aspirin  81 mg Oral Daily    clopidogrel  75 mg Oral Daily    atorvastatin  20 mg Oral Nightly    ceFAZolin (ANCEF) IVPB  2,000 mg IntraVENous Q8H    ipratropium-albuterol  1 ampule Inhalation Q4H WA    insulin glargine  0.15 Units/kg SubCUTAneous Nightly    insulin lispro  0-6 Units SubCUTAneous TID     insulin lispro  0-3 Units SubCUTAneous Nightly    [MAR Hold] primidone  150 mg Oral Daily    [MAR Hold] primidone  200 mg Oral Nightly    [MAR Hold] insulin glargine  53 Units SubCUTAneous QAM    [MAR Hold] gemfibrozil  600 mg Oral BID    [MAR Hold] levothyroxine  137 mcg Oral QAM AC    [MAR Hold] insulin lispro  0-8 Units SubCUTAneous TID WC    [MAR Hold] insulin lispro  0-4 Units SubCUTAneous Nightly    [MAR Hold] sodium chloride flush  5-40 mL IntraVENous 2 times per day    [MAR Hold] aspirin  81 mg Oral Daily    [MAR Hold] sodium chloride flush  5-40 mL IntraVENous 2 times per day    [MAR Hold] sodium chloride flush  5-40 mL IntraVENous 2 times per day    [MAR Hold] sodium chloride flush  10 mL IntraVENous 2 times per day       TELEMETRY: Sinus     Physical Exam:      Physical Exam      General:  Awake, alert, NAD  Skin:  Warm and dry  Neck:  no jvd , no carotid bruits  Chest:  Clear to auscultation, no wheezing or rales  Cardiovascular:  RRR O3U7 no murmurs, clicks, gallups, or rubs  Abdomen:  Soft nontender, nondistended, bowel sounds present  Extremities:  Edema: none      Lab Data:  CBC:   Recent Labs     10/21/22  0509 10/21/22  0849 10/21/22  1300 10/21/22  1815 10/21/22  1900 10/22/22  0600   WBC 6.4  --  8.9  --   --  10.1   HGB 11.9*   < > 9.0* 10.9* 10.9* 10.3*   HCT 36.3*  --  26.5*  --  32.5* 31.5*   MCV 86.6  --  86.3  --   --  87.5     --  167  --   --  188    < > = values in this interval not displayed. BMP:   Recent Labs     10/20/22  0255 10/20/22  1421 10/21/22  0509 10/21/22  0849 10/21/22  1150 10/21/22  1300 10/21/22  1304 10/21/22  1700 10/21/22  2109 10/22/22  0600      < > 138  --   --  141  --   --   --  142   K 4.5   < > 4.2  --   --  3.8   < > 4.2 4.5 4.1      < > 108  --   --  109  --   --   --  111   CO2 19*   < > 20*   < > 24 24  --   --   --  20*   PHOS 2.6  --   --   --   --   --   --   --   --   --    BUN 20   < > 16  --   --  15  --   --   --  17   CREATININE 0.8   < > 0.8   < > 1.0 0.7  --   --   --  0.6    < > = values in this interval not displayed. LIVER PROFILE:   Recent Labs     10/20/22  0255   AST 28   ALT 23   BILITOT 0.3   ALKPHOS 81     PT/INR:   Recent Labs     10/21/22  1300 10/22/22  0600   PROTIME 16.5* 14.8*   INR 1.32* 1.16     APTT:   Recent Labs     10/20/22  1421 10/20/22  1949 10/21/22  0509   APTT 94.6* 124.3* 57.4*     BNP:  No results for input(s): BNP in the last 72 hours. CK, CKMB, Troponin: @LABRCNT (CKTOTAL:3, CKMB:3, TROPONINI:3)@    IMAGING:  XR CHEST PORTABLE    Result Date: 10/22/2022  Patient: Sandy Chamorro  Time Out: 06:36Exam(s): FILM CXR 1 VIEW EXAM:  XR Chest, 1 ViewCLINICAL HISTORY:  Post op open heart surgery. TECHNIQUE:  Frontal view of the chest.COMPARISON:  10/21/2022FINDINGS:  Lungs:  Small amount of airspace opacities over  left middle lung zone. Pleural space:  Cannot rule out small bilateral pleural effusions. No pneumothorax. Mediastinum:  Sternal wires, mediastinal clips and right jugular [place]-Amtt catheter are again noted. Bones/joints: Unchanged. Tubes, lines and devices:  Endotracheal tube has been moved. Endotracheal tube has been moved. Otherwise, no substantially change. Electronically signed by Siri De León M.D. on 10-22-22 at 0636    XR CHEST PORTABLE    Result Date: 10/21/2022  NO PRIOR REPORT AVAILABLE Exam: X-RAY OF [facility] Clinical data:Intra-op CABG.  Technique:Single view of the chest. Prior studies: Radiograph of the chest dated 10/20/2022 image. Findings: The lungs are grossly clear; noevidence of acute infiltrate or pleural effusion. Cardiac silhouette is within normal limits. Median sternotomy wires and clips from CABG noted. ET tube in good position above the radha. Right transjugular catheter with tip in the main pulmonary artery is noted. No acute osseous abnormality is detected. There is suspicion of a left neck catheter with tip crossing the upper left lobe and producing a dense linear focus left upper lobe. Also noted is a large bore catheter in the gastric location, though origin is not clear. Wire coiled in the left upper quadrant, appears to be approaching from the midline lower chest.?? Right transjugular catheter with tip in the main pulmonary artery. ET tube in good position. Large bore catheter crossing the gastric location, origin undetermined but possibly from the left neck. CABG performed. Recommendation: Follow up short term. Electronically Signed by Eve Real MD at 21-Oct-2022 02:45:53 PM EST             XR CHEST PORTABLE    Result Date: 10/20/2022  Patient: Tobin Lobe  Time Out: 04:35Exam(s): FILM CXR 1 VIEW EXAM:  XR Chest, 1 ViewCLINICAL HISTORY:   Reason for exam: shortness of breath. TECHNIQUE:  Frontal view of the chest.COMPARISON:  No relevant prior studies available. FINDINGS:  Lungs: Increased interstitial airspace opacities throughout the lungs bilaterally. Pleural space:  Unremarkable. No pneumothorax. Heart:  No cardiomegaly. Mediastinum:  Unremarkable. Bones/joints:  Degenerative changes within the right greater than left shoulders. Increased interstitial airspace opacities throughout the lungs bilaterally. Correlation for atypical infection versus mild pulmonary vascular congestion recommended. Electronically signed by Clayton Morrissey M.D. on 10-20-22 at 3263    Vascular carotid duplex bilateral    Result Date: 10/21/2022  Vascular Carotid Procedure  Demographics   Patient Name  [facility]       Age                63                CLEO   Patient       [id number]        Gender             Female  Number   Visit Number  [id number]     Interpreting       Rafia Burns                              Physician   Date of Birth 1960    Referring          Ольга Conley MD                              Physician   Accession     [id number]    [facility] [id number] [facility]  Number                                         RVS, RCS  Procedure Type of Study:   Cerebral:Carotid, VL CAROTID BILATERAL. Indications for Study:Pre-op CABG. Risk Factors   - The patient's risk factor(s) include: diabetes mellitus, dyslipidemia     and arterial hypertension.   - The patient's last creatinine was 0.8 mg/dl. Allergies   - No known allergies. Impression   There is mixed plaque visualized in the bilateral internal carotid  artery(ies). There is less than 50% stenosis in the right internal carotid artery. There is less than 50% stenosis of the left internal carotid artery. There is normal antegrade flow in the bilateral vertebral artery(ies). Signature   ----------------------------------------------------------------  Electronically signed by Les Gibbs(Interpreting  physician) on 10/21/2022 01:47 PM  ----------------------------------------------------------------  Blood Pressure:Left arm 120/80 mmHg. Velocities are measured in cm/s ; Diameters are measured in mm Carotid Right Measurements +------------+-------+-------+--------+-------+------------+---------------+ ! Location    ! PSV    ! EDV    ! Angle   ! RI     !%Stenosis   ! Tortuosity     ! +------------+-------+-------+--------+-------+------------+---------------+ ! Prox CCA    !73.5   !17.6   ! 60      !0.76   !            !               ! +------------+-------+-------+--------+-------+------------+---------------+ ! Mid CCA     !76.3   !21.4   !60      !0.72   !            !               ! +------------+-------+-------+--------+-------+------------+---------------+ ! Prox ICA    ! 65     !23.3   ! 60      !0.64   !            !               ! +------------+-------+-------+--------+-------+------------+---------------+ ! Mid ICA     ! 87     !33.9   !60      !0.61   !            !               ! +------------+-------+-------+--------+-------+------------+---------------+ ! Dist ICA    ! 73.7   !31.5   !60      !0.57   !            !               ! +------------+-------+-------+--------+-------+------------+---------------+ ! Prox ECA    !103    !       !61      !       !            !               ! +------------+-------+-------+--------+-------+------------+---------------+ ! Vertebral   !45.9   !15.8   !60      !0.66   !            !               ! +------------+-------+-------+--------+-------+------------+---------------+   - There is antegrade vertebral flow noted on the right side. - Additional Measurements:ICAPSV/CCAPSV 1.18. ICAEDV/CCAEDV 1.93. Carotid Left Measurements +------------+-------+-------+--------+-------+------------+---------------+ ! Location    ! PSV    ! EDV    ! Angle   ! RI     !%Stenosis   ! Tortuosity     ! +------------+-------+-------+--------+-------+------------+---------------+ ! Prox CCA    !92.9   !11.3   !60      !0.88   !            !               ! +------------+-------+-------+--------+-------+------------+---------------+ ! Mid CCA     !98.8   !19.3   !60      !0.8    ! !               ! +------------+-------+-------+--------+-------+------------+---------------+ ! Prox ICA    !55.3   !18.9   !60      !0.66   !            !               ! +------------+-------+-------+--------+-------+------------+---------------+ ! Mid ICA     ! 88.7   !33.8   !60      !0.62   !            !               ! +------------+-------+-------+--------+-------+------------+---------------+ ! Dist ICA    !68     !29.9   !60      !0.56   !            !               ! +------------+-------+-------+--------+-------+------------+---------------+ ! Prox ECA    !111    !       !61      !       !            !               ! +------------+-------+-------+--------+-------+------------+---------------+ ! Vertebral   !70.7   !20.2   ! 60      !0.71   !            !               ! +------------+-------+-------+--------+-------+------------+---------------+   - There is antegrade vertebral flow noted on the left side. - Additional Measurements:ICAPSV/CCAPSV 0.95. ICAEDV/CCAEDV 2.99. VL Vein Mapping Lower Bilateral    Result Date: 10/21/2022  Vascular Lower Extremity Vein Mapping Procedure  Demographics   Patient Name  [facility]       Age                63                CLEO   Patient       [id number]        Gender             Female  Number   Visit Number  [id number]     Interpreting       Humairashasta Aviles                              Physician   Date of Birth 1960    Referring          Rachel Marshall MD                              Physician   Accession     [id number]    Sonographer        [place] [address]  Number                                         RVS, RCS  Procedure Type of Study:   Veins:Lower Extremity Vein Mapping, 40384, VEIN MAPPING LOWER BILATERAL . Indications for Study:Pre-op CABG. Risk Factors   - The patient's risk factor(s) include: diabetes mellitus, dyslipidemia     and arterial hypertension.   - The patient's last creatinine was 0.8 mg/dl. Allergies   - No known allergies. Impression   Bilateral lower extremity saphenous vein mapping performed. Veins are  patent with measurements noted.    Signature   ----------------------------------------------------------------  Electronically signed by Essie LeaInterpreting  physician) on 10/21/2022 01:45 PM  ----------------------------------------------------------------  Velocities are measured in cm/s ; Diameters are measured in mm +--------------------------------------++--------+-----+----+--------+-----+ ! Superficial - Great Saphenous Vein    ! ! Right   ! ! Left!        !     ! +--------------------------------------++--------+-----+----+--------+-----+ ! Location                              ! !Diameter! Depth! !Diameter! Depth! +--------------------------------------++--------+-----+----+--------+-----+ ! GSV 2 cm Distal to Junction           ! !3.8     !     !    !6.1     !     ! +--------------------------------------++--------+-----+----+--------+-----+ ! GSV High Thigh                        ! !3.8     !     !    !3.5     !     ! +--------------------------------------++--------+-----+----+--------+-----+ ! GSV Mid Thigh                         ! !4.4     !     !    !2.9     !     ! +--------------------------------------++--------+-----+----+--------+-----+ ! GSV Low Thigh                         !!4.1     !     !    !3.2     !     ! +--------------------------------------++--------+-----+----+--------+-----+ ! GSV Knee                              !!3.9     !     !    !3.6     !     ! +--------------------------------------++--------+-----+----+--------+-----+ ! GSV High Calf                         !!3.2     !     !    !3.1     !     ! +--------------------------------------++--------+-----+----+--------+-----+ ! GSV Mid Calf                          !!3.7     !     !    !3.2     !     ! +--------------------------------------++--------+-----+----+--------+-----+ ! GSV Ankle                             !!4.2     !     !    !4.1     !     ! +--------------------------------------++--------+-----+----+--------+-----+        Assessment:  Postoperative day #1 status post CABG x4 LIMA graft LAD vein graft to OM1 and OM2 and vein graft to main right coronary artery  Cardiac catheterization 10/20/2022 ejection fraction 35% large apical hypokinesis involving inferoapical and anteroapical, 60% left main stenosis, 100% occlusion LAD collateralized 90% second OM 95% mid right coronary artery  Echocardiogram 10/20/2022 ejection fraction 35 to 40% mild concentric LVH grade 1 diastolic dysfunction mild TR RVSP 20 mild mitral stenosis mild MR  Hyperlipidemia  Preoperative ventricular tachycardia    Plan:  Continue current medical management cardiovascular status appears stable    Elbert Stewart MD, MD 10/22/2022 10:28 AM

## 2022-10-22 NOTE — PROGRESS NOTES
Physical Therapy  Facility/Department: [facility] ICU  Physical Therapy Initial Assessment    Name: Jeramy November  : 1960  MRN: [id number]  Date of Service: 10/22/2022    Discharge Recommendations:  Continue to assess pending progress, 24 hour supervision or assist, Patient would benefit from continued therapy after discharge          Patient Diagnosis(es): The primary encounter diagnosis was Acute coronary syndrome ([facility] [address] A diagnosis of Heart disease was also pertinent to this visit. Past Medical History:  has a past medical history of Acquired cataract, Diabetes ([facility] [address] 2, Essential tremor, and Hypercholesteremia. Past Surgical History:  has a past surgical history that includes Coronary artery bypass graft (N/A, 10/21/2022). Assessment   Body Structures, Functions, Activity Limitations Requiring Skilled Therapeutic Intervention: Decreased functional mobility ; Decreased strength;Decreased balance; Increased pain;Decreased endurance  Assessment: Pt. will benefit from cont. PT to decrease impairments. Pt. a fall risk due to decreased endurance and fatigue. Will encourage progressive mobility and out of bed activity. Treatment Diagnosis: deconditioning after surgery  Therapy Prognosis: Good  Decision Making: Medium Complexity  Barriers to Learning: none noted  Requires PT Follow-Up: Yes  Activity Tolerance  Activity Tolerance: Patient tolerated treatment well  Activity Tolerance Comments: sat 93%,  after amb.      Plan   Physcial Therapy Plan  General Plan: 6-7 times per week  Current Treatment Recommendations: Strengthening, ROM, Balance training, Functional mobility training, Transfer training, Endurance training, Safety education & training, Patient/Caregiver education & training, Equipment evaluation, education, & procurement, Therapeutic activities, Positioning  Safety Devices  Type of Devices: Gait belt, Call light within reach, Patient at risk for falls, Nurse notified, Left in chair (reclined) Restrictions  Restrictions/Precautions  Restrictions/Precautions: Surgical Protocols  Required Braces or Orthoses?: No  Position Activity Restriction  Sternal Precautions: No Pushing, No Pulling, 5# Lifting Restrictions     Subjective   Pain: 2/10 surgical site  General  Chart Reviewed: Yes  Patient assessed for rehabilitation services?: Yes  Response To Previous Treatment: Not applicable  Family / Caregiver Present: No  Referring Practitioner: Pierre Hidalgo MD  Referral Date : 10/21/22  Diagnosis: ischemic cardiomyopathy secondary to MVCAD  Follows Commands: Within Functional Limits  General Comment  Comments: RNDasha PT and assisted with lines. 10/21 CABG x 4  Subjective  Subjective: Pt. willing to walk. States pain isn't too bad. Social/Functional History  Social/Functional History  Lives With: Alone  Type of Home: House  Home Layout: One level  Home Equipment:  (none)  ADL Assistance: Independent  Ambulation Assistance: Independent  Transfer Assistance: Independent  Active :  Yes  Additional Comments: reports her sister is here to stay with her from [facility] during recovery  Vision/Hearing  Hearing  Hearing: Within functional limits    Cognition   Orientation  Overall Orientation Status: Within Functional Limits  Cognition  Overall Cognitive Status: WFL     Objective   Heart Rate: 89  Heart Rate Source: Monitor  BP: (!) 95/58  BP Location: Left upper arm  BP Method: Automatic  MAP (Calculated): 70.33  Resp: 24  SpO2: 95 %  O2 Device: Nasal cannula  Comment: 2LO2     Observation/Palpation  Posture: Good  Observation: telemetry, 2LO2, IV (RN unhooked), O2 sat monitor, bipin  Edema: B hands/feet a little swollen        AROM RLE (degrees)  RLE AROM: WFL  AROM LLE (degrees)  LLE AROM : WFL  Strength RLE  Strength RLE: Exception  Comment: at least 3+/5  Strength LLE  Strength LLE: Exception  Comment: at least 3+/5           Bed mobility  Supine to Sit: Unable to assess  Sit to Supine: Unable to assess  Bed Mobility Comments: up in recliner  Transfers  Sit to Stand: Minimal Assistance  Stand to Sit: Minimal Assistance  Comment: v. cues for technique to due sternal prec. Ambulation  Surface: Level tile  Device:  (pushing w/c)  Other Apparatus: O2  Assistance: Minimal assistance  Quality of Gait: a little unsteady  Gait Deviations: Slow Kathi;Decreased step length;Decreased step height  Distance: 150'+  Comments: declined standing rest break     Balance  Posture: Good  Sitting - Static: Good;-  Sitting - Dynamic: Fair;+  Standing - Static: Fair;-  Standing - Dynamic: Poor;+           OutComes Score                                                  AM-PAC Score             Tinneti Score       Goals  Short Term Goals  Time Frame for Short Term Goals: 2 wks  Short Term Goal 1: supine to sit indep  Short Term Goal 2: sit to stand indep  Short Term Goal 3: amb. 300'+ indep  Patient Goals   Patient Goals : go home       Education  Patient Education  Education Given To: Patient  Education Provided: Role of Therapy;Plan of Care;Transfer Training  Education Provided Comments: sternal prec.   Education Method: Verbal  Barriers to Learning: None  Education Outcome: Verbalized understanding;Continued education needed      Therapy Time   Individual Concurrent Group Co-treatment   Time In           Time Out           Minutes                   Augusta Thompson PT   Electronically signed by Augusta Thompson PT on 10/22/2022 at 12:52 PM

## 2022-10-22 NOTE — OP NOTE
mammary itself was somewhat small, but it  did improve in its diameter with topical papaverine. The mammary was  divided distally after giving an on-pump dose of heparin to achieve an  ACT greater than 500 seconds. The right saphenous vein was harvested  using totally endoscopic techniques with a Archy system. The vein  itself was about 3.5 to 4 mm in diameter and had no significant  varicosities or other abnormalities making it a good conduit. The  patient had the ascending aorta imaged with an epiaortic ultrasound to  make sure there was not any atherosclerosis at the site that the aorta  was cannulated for the cardiopulmonary bypass cannula and then where the  partial clamp was placed for the placement of the two proximal  anastomoses. The ascending aorta was then cannulated and the 21 right  angle cannula was secured in position using a pursestring. The  patient's proximal anastomoses were created using a 4.0 punch in the  ascending aorta with a 6-0 Prolene and no repair sutures were required. Both sites were marked with vein markers. The patient then had the  cardiopulmonary bypass initiated and was on bypass for a total of 79  minutes. On-pump the heart was fully decompressed. The LIMA-LAD  anastomosis was created. The LIMA at this point had dilated with  papaverine and was a good conduit and it went to the midportion of the  LAD. The vessel had some atherosclerosis in it, but otherwise was a  reasonable target for suturing, it was about 2.0 mm. It was totally  occluded. The flow in this graft after the completion anastomosis was  found to be 80 mL a minute. No repair sutures were required. The heart  was allowed to continue to beat and then the vein going to the lateral  wall was sequenced to both OM1 and OM2. Both these targets were  reasonable quality about 1.9 to 2 mm in diameter.   The first anastomosis  was created as a side-to-side anastomosis to OM1 and then OM2 was sewn as a \"end

## 2022-10-23 ENCOUNTER — APPOINTMENT (OUTPATIENT)
Dept: GENERAL RADIOLOGY | Age: 62
DRG: 234 | End: 2022-10-23

## 2022-10-23 LAB
ANION GAP SERPL CALCULATED.3IONS-SCNC: 11 MMOL/L (ref 7–19)
BUN BLDV-MCNC: 17 MG/DL (ref 8–23)
CALCIUM SERPL-MCNC: 8.3 MG/DL (ref 8.8–10.2)
CHLORIDE BLD-SCNC: 103 MMOL/L (ref 98–111)
CO2: 20 MMOL/L (ref 22–29)
CREAT SERPL-MCNC: 0.9 MG/DL (ref 0.5–0.9)
GFR SERPL CREATININE-BSD FRML MDRD: >60 ML/MIN/{1.73_M2}
GLUCOSE BLD-MCNC: 220 MG/DL (ref 70–99)
GLUCOSE BLD-MCNC: 227 MG/DL (ref 70–99)
GLUCOSE BLD-MCNC: 244 MG/DL (ref 74–109)
GLUCOSE BLD-MCNC: 270 MG/DL (ref 70–99)
GLUCOSE BLD-MCNC: 302 MG/DL (ref 70–99)
HCT VFR BLD CALC: 29.4 % (ref 37–47)
HEMOGLOBIN: 9.4 G/DL (ref 12–16)
INR BLD: 1.37 (ref 0.88–1.18)
MAGNESIUM: 1.9 MG/DL (ref 1.6–2.4)
MCH RBC QN AUTO: 28.6 PG (ref 27–31)
MCHC RBC AUTO-ENTMCNC: 32 G/DL (ref 33–37)
MCV RBC AUTO: 89.4 FL (ref 81–99)
PDW BLD-RTO: 14.5 % (ref 11.5–14.5)
PERFORMED ON: ABNORMAL
PLATELET # BLD: 173 K/UL (ref 130–400)
PMV BLD AUTO: 10.7 FL (ref 9.4–12.3)
POTASSIUM SERPL-SCNC: 4.5 MMOL/L (ref 3.5–5)
PROTHROMBIN TIME: 17 SEC (ref 12–14.6)
RBC # BLD: 3.29 M/UL (ref 4.2–5.4)
SODIUM BLD-SCNC: 134 MMOL/L (ref 136–145)
WBC # BLD: 12.8 K/UL (ref 4.8–10.8)

## 2022-10-23 PROCEDURE — 71045 X-RAY EXAM CHEST 1 VIEW: CPT

## 2022-10-23 PROCEDURE — 6370000000 HC RX 637 (ALT 250 FOR IP): Performed by: HOSPITALIST

## 2022-10-23 PROCEDURE — 2580000003 HC RX 258: Performed by: SURGERY

## 2022-10-23 PROCEDURE — 97116 GAIT TRAINING THERAPY: CPT

## 2022-10-23 PROCEDURE — 2500000003 HC RX 250 WO HCPCS: Performed by: SURGERY

## 2022-10-23 PROCEDURE — 6360000002 HC RX W HCPCS: Performed by: SURGERY

## 2022-10-23 PROCEDURE — 6370000000 HC RX 637 (ALT 250 FOR IP): Performed by: SURGERY

## 2022-10-23 PROCEDURE — 2140000000 HC CCU INTERMEDIATE R&B

## 2022-10-23 PROCEDURE — 2700000000 HC OXYGEN THERAPY PER DAY

## 2022-10-23 PROCEDURE — 82947 ASSAY GLUCOSE BLOOD QUANT: CPT

## 2022-10-23 PROCEDURE — 94640 AIRWAY INHALATION TREATMENT: CPT

## 2022-10-23 PROCEDURE — 71045 X-RAY EXAM CHEST 1 VIEW: CPT | Performed by: RADIOLOGY

## 2022-10-23 PROCEDURE — 85027 COMPLETE CBC AUTOMATED: CPT

## 2022-10-23 PROCEDURE — 99231 SBSQ HOSP IP/OBS SF/LOW 25: CPT | Performed by: INTERNAL MEDICINE

## 2022-10-23 PROCEDURE — 83735 ASSAY OF MAGNESIUM: CPT

## 2022-10-23 PROCEDURE — 85610 PROTHROMBIN TIME: CPT

## 2022-10-23 PROCEDURE — 80048 BASIC METABOLIC PNL TOTAL CA: CPT

## 2022-10-23 PROCEDURE — 2580000003 HC RX 258: Performed by: ANESTHESIOLOGY

## 2022-10-23 RX ORDER — INSULIN LISPRO 100 [IU]/ML
0-4 INJECTION, SOLUTION INTRAVENOUS; SUBCUTANEOUS NIGHTLY
Status: DISCONTINUED | OUTPATIENT
Start: 2022-10-23 | End: 2022-10-26 | Stop reason: HOSPADM

## 2022-10-23 RX ORDER — BUMETANIDE 0.25 MG/ML
0.5 INJECTION, SOLUTION INTRAMUSCULAR; INTRAVENOUS 2 TIMES DAILY
Status: DISCONTINUED | OUTPATIENT
Start: 2022-10-23 | End: 2022-10-25

## 2022-10-23 RX ORDER — INSULIN LISPRO 100 [IU]/ML
0-16 INJECTION, SOLUTION INTRAVENOUS; SUBCUTANEOUS
Status: DISCONTINUED | OUTPATIENT
Start: 2022-10-23 | End: 2022-10-26 | Stop reason: HOSPADM

## 2022-10-23 RX ADMIN — SODIUM CHLORIDE, PRESERVATIVE FREE 10 ML: 5 INJECTION INTRAVENOUS at 21:59

## 2022-10-23 RX ADMIN — SODIUM CHLORIDE, PRESERVATIVE FREE 10 ML: 5 INJECTION INTRAVENOUS at 09:00

## 2022-10-23 RX ADMIN — BUMETANIDE 0.5 MG: 0.25 INJECTION INTRAMUSCULAR; INTRAVENOUS at 11:20

## 2022-10-23 RX ADMIN — CEFAZOLIN 2000 MG: 2 INJECTION, POWDER, FOR SOLUTION INTRAMUSCULAR; INTRAVENOUS at 01:35

## 2022-10-23 RX ADMIN — FUROSEMIDE 20 MG: 20 TABLET ORAL at 07:25

## 2022-10-23 RX ADMIN — METOPROLOL TARTRATE 25 MG: 25 TABLET, FILM COATED ORAL at 07:25

## 2022-10-23 RX ADMIN — INSULIN LISPRO 3 UNITS: 100 INJECTION, SOLUTION INTRAVENOUS; SUBCUTANEOUS at 07:24

## 2022-10-23 RX ADMIN — OXYCODONE 10 MG: 5 TABLET ORAL at 11:20

## 2022-10-23 RX ADMIN — MORPHINE SULFATE 2 MG: 2 INJECTION, SOLUTION INTRAMUSCULAR; INTRAVENOUS at 15:00

## 2022-10-23 RX ADMIN — OXYCODONE 10 MG: 5 TABLET ORAL at 20:53

## 2022-10-23 RX ADMIN — INSULIN LISPRO 1 UNITS: 100 INJECTION, SOLUTION INTRAVENOUS; SUBCUTANEOUS at 19:40

## 2022-10-23 RX ADMIN — IPRATROPIUM BROMIDE AND ALBUTEROL SULFATE 1 AMPULE: 2.5; .5 SOLUTION RESPIRATORY (INHALATION) at 18:56

## 2022-10-23 RX ADMIN — IPRATROPIUM BROMIDE AND ALBUTEROL SULFATE 1 AMPULE: 2.5; .5 SOLUTION RESPIRATORY (INHALATION) at 06:16

## 2022-10-23 RX ADMIN — INSULIN LISPRO 4 UNITS: 100 INJECTION, SOLUTION INTRAVENOUS; SUBCUTANEOUS at 17:47

## 2022-10-23 RX ADMIN — MORPHINE SULFATE 2 MG: 2 INJECTION, SOLUTION INTRAMUSCULAR; INTRAVENOUS at 19:22

## 2022-10-23 RX ADMIN — IPRATROPIUM BROMIDE AND ALBUTEROL SULFATE 1 AMPULE: 2.5; .5 SOLUTION RESPIRATORY (INHALATION) at 10:49

## 2022-10-23 RX ADMIN — PRIMIDONE 150 MG: 50 TABLET ORAL at 21:59

## 2022-10-23 RX ADMIN — OXYCODONE 5 MG: 5 TABLET ORAL at 07:26

## 2022-10-23 RX ADMIN — MORPHINE SULFATE 2 MG: 2 INJECTION, SOLUTION INTRAMUSCULAR; INTRAVENOUS at 09:29

## 2022-10-23 RX ADMIN — INSULIN LISPRO 8 UNITS: 100 INJECTION, SOLUTION INTRAVENOUS; SUBCUTANEOUS at 11:55

## 2022-10-23 RX ADMIN — ATORVASTATIN CALCIUM 20 MG: 20 TABLET, FILM COATED ORAL at 19:38

## 2022-10-23 RX ADMIN — INSULIN GLARGINE 13 UNITS: 100 INJECTION, SOLUTION SUBCUTANEOUS at 19:40

## 2022-10-23 RX ADMIN — ASPIRIN 81 MG: 81 TABLET, COATED ORAL at 07:24

## 2022-10-23 RX ADMIN — METOPROLOL TARTRATE 25 MG: 25 TABLET, FILM COATED ORAL at 19:39

## 2022-10-23 RX ADMIN — OXYCODONE 5 MG: 5 TABLET ORAL at 03:34

## 2022-10-23 RX ADMIN — CLOPIDOGREL BISULFATE 75 MG: 75 TABLET ORAL at 07:25

## 2022-10-23 RX ADMIN — BUMETANIDE 0.5 MG: 0.25 INJECTION INTRAMUSCULAR; INTRAVENOUS at 19:37

## 2022-10-23 RX ADMIN — MAGNESIUM SULFATE HEPTAHYDRATE 2000 MG: 40 INJECTION, SOLUTION INTRAVENOUS at 04:55

## 2022-10-23 ASSESSMENT — PAIN DESCRIPTION - ORIENTATION
ORIENTATION: MID
ORIENTATION: MID
ORIENTATION: RIGHT;LEFT
ORIENTATION: RIGHT;LEFT

## 2022-10-23 ASSESSMENT — PAIN SCALES - GENERAL
PAINLEVEL_OUTOF10: 0
PAINLEVEL_OUTOF10: 2
PAINLEVEL_OUTOF10: 0
PAINLEVEL_OUTOF10: 3
PAINLEVEL_OUTOF10: 4
PAINLEVEL_OUTOF10: 5
PAINLEVEL_OUTOF10: 2
PAINLEVEL_OUTOF10: 5
PAINLEVEL_OUTOF10: 6
PAINLEVEL_OUTOF10: 4
PAINLEVEL_OUTOF10: 7
PAINLEVEL_OUTOF10: 3

## 2022-10-23 ASSESSMENT — PAIN DESCRIPTION - LOCATION
LOCATION: CHEST
LOCATION: STERNUM
LOCATION: STERNUM
LOCATION: CHEST;INCISION
LOCATION: CHEST
LOCATION: CHEST;INCISION
LOCATION: GENERALIZED

## 2022-10-23 ASSESSMENT — PAIN - FUNCTIONAL ASSESSMENT
PAIN_FUNCTIONAL_ASSESSMENT: ACTIVITIES ARE NOT PREVENTED

## 2022-10-23 ASSESSMENT — PAIN DESCRIPTION - PAIN TYPE: TYPE: SURGICAL PAIN

## 2022-10-23 ASSESSMENT — PAIN DESCRIPTION - DESCRIPTORS
DESCRIPTORS: ACHING
DESCRIPTORS: DISCOMFORT
DESCRIPTORS: ACHING
DESCRIPTORS: DISCOMFORT

## 2022-10-23 ASSESSMENT — PAIN DESCRIPTION - FREQUENCY: FREQUENCY: CONTINUOUS

## 2022-10-23 ASSESSMENT — PAIN DESCRIPTION - ONSET: ONSET: GRADUAL

## 2022-10-23 NOTE — PROGRESS NOTES
Progress Note    10/23/2022 11:01 AM          POD#   2    Subjective:  Ms. Nix Face is doing well  PULSE OXIMETRY RANGE: SpO2  Av.2 %  Min: 85 %  Max: 96 %  SUPPLEMENTAL O2: O2 Flow Rate (L/min): 3 L/min   Vital Signs: /74   Pulse 91   Temp 98.8 °F (37.1 °C) (Temporal)   Resp 24   Ht 5' 5\" (1.651 m)   Wt 212 lb 1.6 oz (96.2 kg)   SpO2 (!) 85%   BMI 35.30 kg/m²    Temperature Range:   Temp: 98.8 °F (37.1 °C)   Temp  Av.9 °F (37.2 °C)  Min: 98.8 °F (37.1 °C)  Max: 99.2 °F (37.3 °C)                   Rhythm: normal sinus rhythm    Labs:   ABG:    Lab Results   Component Value Date/Time    PHART 7.370 10/21/2022 03:21 PM    PO2ART 149.0 10/21/2022 03:21 PM    UHJ8HCT 41.0 10/21/2022 03:21 PM    Q3XFOJGW 97.0 10/21/2022 03:21 PM    GCB5IRY 25 10/21/2022 11:50 AM    EID3STN 23.7 10/21/2022 03:21 PM    BEART -1.5 10/21/2022 03:21 PM     CBC:   Recent Labs     10/21/22  1300 10/21/22  1815 10/21/22  1900 10/22/22  0600 10/23/22  0338   WBC 8.9  --   --  10.1 12.8*   HGB 9.0*   < > 10.9* 10.3* 9.4*   HCT 26.5*  --  32.5* 31.5* 29.4*   MCV 86.3  --   --  87.5 89.4     --   --  188 173    < > = values in this interval not displayed. BMP:   Recent Labs     10/21/22  1300 10/21/22  1304 10/21/22  2109 10/22/22  0600 10/23/22  0338     --   --  142 134*   K 3.8   < > 4.5 4.1 4.5     --   --  111 103   CO2 24  --   --  20* 20*   BUN 15  --   --  17 17   CREATININE 0.7  --   --  0.6 0.9    < > = values in this interval not displayed. PT/INR:   Recent Labs     10/21/22  1300 10/22/22  0600 10/23/22  0338   PROTIME 16.5* 14.8* 17.0*   INR 1.32* 1.16 1.37*     APTT:   Recent Labs     10/20/22  1421 10/20/22  1949 10/21/22  0509   APTT 94.6* 124.3* 57.4*     Chest X-Ray:  Left lung well expanded   CT:  I/O last 3 completed shifts: In: 2982.2 [P.O.:1140; I.V.:1742.2; IV Piggyback:100]  Out: 1899 [Urine:1815; Chest Tube:84]      I/O last 3 completed shifts:   In: 2982.2 [P.O.:1140; I.V.:1742.2; IV Piggyback:100]  Out: 1899 [Urine:1815; Chest Tube:84]  Scheduled Meds:    metoprolol tartrate  25 mg Oral BID    furosemide  20 mg Oral Daily    primidone  150 mg Oral Nightly    sodium chloride flush  5-40 mL IntraVENous 2 times per day    sodium chloride flush  5-40 mL IntraVENous 2 times per day    aspirin  81 mg Oral Daily    clopidogrel  75 mg Oral Daily    atorvastatin  20 mg Oral Nightly    ipratropium-albuterol  1 ampule Inhalation Q4H WA    insulin glargine  0.15 Units/kg SubCUTAneous Nightly    insulin lispro  0-6 Units SubCUTAneous TID WC    insulin lispro  0-3 Units SubCUTAneous Nightly    [MAR Hold] primidone  200 mg Oral Nightly    [MAR Hold] insulin glargine  53 Units SubCUTAneous QAM    [MAR Hold] gemfibrozil  600 mg Oral BID    [MAR Hold] levothyroxine  137 mcg Oral QAM AC    [MAR Hold] insulin lispro  0-8 Units SubCUTAneous TID WC    [MAR Hold] insulin lispro  0-4 Units SubCUTAneous Nightly    [MAR Hold] sodium chloride flush  5-40 mL IntraVENous 2 times per day    [MAR Hold] aspirin  81 mg Oral Daily    [MAR Hold] sodium chloride flush  5-40 mL IntraVENous 2 times per day    [MAR Hold] sodium chloride flush  5-40 mL IntraVENous 2 times per day    [MAR Hold] sodium chloride flush  10 mL IntraVENous 2 times per day     Continuous Infusions:    sodium chloride      sodium chloride      sodium chloride      dextrose      sodium chloride      [MAR Hold] dextrose       Exam:   General appearance: NAD  Neck: no bruits, no JVD, No lymph nodes   Lungs: no rhonchi, no wheezes, no rales   Sternum: stable, dressing dry and intact    Heart: S1 and S2 no murmer, + rub  Abdomen: positive bowel sounds, no bruits, no masses  Extremities: warm and dry, no cyanosis, no clubbing  Leg Wounds:  Dressing dry and intact    Assessment  SP CABG x 4; doing well. NSR on metoprolol. Remains on supplemental O2. CXR shows LLL atelectasis. Will encourage lung exercises. Renal function stable.   Will start diuresis at higher dose. Afebrile, WBC ct 12K, wound healing well. Glucose level high. Will restart home insulin regimen. Transfer to PCU.       Patient Active Problem List   Diagnosis    Hypercholesteremia    Atypical chest pain    Heart disease    Acute coronary syndrome (HCC)    CAD in native artery         MD Kvng Baker MD

## 2022-10-23 NOTE — PLAN OF CARE
Problem: Discharge Planning  Goal: Discharge to home or other facility with appropriate resources  10/23/2022 1457 by Lakisha Harris RN  Outcome: Progressing  10/23/2022 1457 by Lakisha Harris RN  Outcome: Progressing  Flowsheets (Taken 10/23/2022 0800)  Discharge to home or other facility with appropriate resources: Identify barriers to discharge with patient and caregiver     Problem: Pain  Goal: Verbalizes/displays adequate comfort level or baseline comfort level  10/23/2022 1457 by Lakisha Harris RN  Outcome: Progressing  10/23/2022 1457 by Lakisha Harris RN  Outcome: Progressing  Flowsheets (Taken 10/23/2022 0800)  Verbalizes/displays adequate comfort level or baseline comfort level:   Encourage patient to monitor pain and request assistance   Assess pain using appropriate pain scale     Problem: Cardiovascular - Adult  Goal: Maintains optimal cardiac output and hemodynamic stability  10/23/2022 1457 by Lakisha Harris RN  Outcome: Progressing  10/23/2022 1457 by Lakisha Harris RN  Outcome: Progressing  Flowsheets (Taken 10/23/2022 0800)  Maintains optimal cardiac output and hemodynamic stability:   Monitor blood pressure and heart rate   Monitor urine output and notify Licensed Independent Practitioner for values outside of normal range  Goal: Absence of cardiac dysrhythmias or at baseline  10/23/2022 1457 by Lakisha Harris RN  Outcome: Progressing  10/23/2022 1457 by Lakisha Harris RN  Outcome: Progressing  Flowsheets (Taken 10/23/2022 0800)  Absence of cardiac dysrhythmias or at baseline:   Monitor cardiac rate and rhythm   Assess for signs of decreased cardiac output     Problem: Skin/Tissue Integrity - Adult  Goal: Skin integrity remains intact  10/23/2022 1457 by Lakisha Harris RN  Outcome: Progressing  10/23/2022 1457 by Lakisha Harris RN  Outcome: Progressing  Flowsheets (Taken 10/23/2022 0800)  Skin Integrity Remains Intact:   Assess vascular access sites hourly   Monitor for areas of redness and/or skin breakdown  Goal: Incisions, wounds, or drain sites healing without S/S of infection  10/23/2022 1457 by Saida Jiménez RN  Outcome: Progressing  10/23/2022 1457 by Saida Jiménez RN  Outcome: Progressing  Flowsheets (Taken 10/23/2022 0800)  Incisions, Wounds, or Drain Sites Healing Without Sign and Symptoms of Infection: TWICE DAILY: Assess and document skin integrity  Goal: Oral mucous membranes remain intact  10/23/2022 1457 by Saida Jiménez RN  Outcome: Progressing  10/23/2022 1457 by Saida Jiménez RN  Outcome: Progressing  Flowsheets (Taken 10/23/2022 0800)  Oral Mucous Membranes Remain Intact: Assess oral mucosa and hygiene practices     Problem: Musculoskeletal - Adult  Goal: Return mobility to safest level of function  10/23/2022 1457 by Saida Jiménez RN  Outcome: Progressing  10/23/2022 1457 by Saida Jiménez RN  Outcome: Progressing  Flowsheets (Taken 10/23/2022 0800)  Return Mobility to Safest Level of Function:   Assist with transfers and ambulation using safe patient handling equipment as needed   Assess patient stability and activity tolerance for standing, transferring and ambulating with or without assistive devices  Goal: Maintain proper alignment of affected body part  10/23/2022 1457 by Saida Jiménez RN  Outcome: Progressing  10/23/2022 1457 by Saida Jiménez RN  Outcome: Progressing  Flowsheets (Taken 10/23/2022 0800)  Maintain proper alignment of affected body part: Support and protect limb and body alignment per provider's orders  Goal: Return ADL status to a safe level of function  10/23/2022 1457 by Saida Jiménez RN  Outcome: Progressing  10/23/2022 1457 by Saida Jiménez RN  Outcome: Progressing  Flowsheets (Taken 10/23/2022 0800)  Return ADL Status to a Safe Level of Function: Administer medication as ordered     Problem: Genitourinary - Adult  Goal: Absence of urinary retention  10/23/2022 1457 by Saida Jiménez RN  Outcome: Progressing  10/23/2022 1457 by Saida Jiménez RN  Outcome: Progressing  Flowsheets  Taken 10/23/2022 1200  Absence of urinary retention: Assess patients ability to void and empty bladder  Taken 10/23/2022 0800  Absence of urinary retention:   Assess patients ability to void and empty bladder   Monitor intake/output and perform bladder scan as needed  Goal: Urinary catheter remains patent  10/23/2022 1457 by Clay Moran RN  Outcome: Progressing  10/23/2022 1457 by Clay Moran RN  Outcome: Progressing  Flowsheets  Taken 10/23/2022 1200  Urinary catheter remains patent: Assess patency of urinary catheter  Taken 10/23/2022 0800  Urinary catheter remains patent: Assess patency of urinary catheter     Problem: Infection - Adult  Goal: Absence of infection at discharge  10/23/2022 1457 by Clay Moran RN  Outcome: Progressing  10/23/2022 1457 by Clay Moran RN  Outcome: Progressing  Flowsheets (Taken 10/23/2022 0800)  Absence of infection at discharge:   Assess and monitor for signs and symptoms of infection   Monitor lab/diagnostic results  Goal: Absence of infection during hospitalization  10/23/2022 1457 by Clay Moran RN  Outcome: Progressing  10/23/2022 1457 by Clay Moran RN  Outcome: Progressing  Flowsheets (Taken 10/23/2022 0800)  Absence of infection during hospitalization:   Assess and monitor for signs and symptoms of infection   Monitor lab/diagnostic results     Problem: Metabolic/Fluid and Electrolytes - Adult  Goal: Electrolytes maintained within normal limits  10/23/2022 1457 by Clay Moran RN  Outcome: Progressing  10/23/2022 1457 by Clay Moran RN  Outcome: Progressing  Flowsheets (Taken 10/23/2022 0800)  Electrolytes maintained within normal limits:   Monitor labs and assess patient for signs and symptoms of electrolyte imbalances   Administer electrolyte replacement as ordered  Goal: Hemodynamic stability and optimal renal function maintained  10/23/2022 1457 by Clay Moran RN  Outcome: Progressing  10/23/2022 1457 by Ted Phillips RN  Outcome: Progressing  Flowsheets (Taken 10/23/2022 0800)  Hemodynamic stability and optimal renal function maintained:   Monitor labs and assess for signs and symptoms of volume excess or deficit   Monitor intake, output and patient weight  Goal: Glucose maintained within prescribed range  10/23/2022 1457 by Ted Phillips RN  Outcome: Progressing  10/23/2022 1457 by Ted Phillips RN  Outcome: Progressing  Flowsheets (Taken 10/23/2022 0800)  Glucose maintained within prescribed range:   Monitor blood glucose as ordered   Assess for signs and symptoms of hyperglycemia and hypoglycemia     Problem: Hematologic - Adult  Goal: Maintains hematologic stability  10/23/2022 1457 by Ted Phillips RN  Outcome: Progressing  10/23/2022 1457 by Ted Phillips RN  Outcome: Progressing  Flowsheets (Taken 10/23/2022 0800)  Maintains hematologic stability: Assess for signs and symptoms of bleeding or hemorrhage     Problem: Chronic Conditions and Co-morbidities  Goal: Patient's chronic conditions and co-morbidity symptoms are monitored and maintained or improved  10/23/2022 1457 by Ted Phillips RN  Outcome: Progressing  10/23/2022 1457 by Ted Phillips RN  Outcome: Progressing  Flowsheets (Taken 10/23/2022 0800)  Care Plan - Patient's Chronic Conditions and Co-Morbidity Symptoms are Monitored and Maintained or Improved: Monitor and assess patient's chronic conditions and comorbid symptoms for stability, deterioration, or improvement     Problem: Safety - Adult  Goal: Free from fall injury  10/23/2022 1457 by Ted Phillips RN  Outcome: Progressing  10/23/2022 1457 by Ted Phillips RN  Outcome: Progressing

## 2022-10-23 NOTE — PROGRESS NOTES
10/23/22 1000   Subjective   Subjective Patient up in chair agrees to walk.    Cognition   Overall Cognitive Status WFL   Orientation   Overall Orientation Status WFL   Vitals   O2 Device Nasal cannula   Comment 2L @ rest 3L @ gait   Transfer Training   Transfer Training Yes   Sit to Stand Stand-by assistance;Contact-guard assistance   Stand to Sit Contact-guard assistance   Gait Training   Gait Training Yes   Gait   Overall Level of Assistance Contact-guard assistance   Speed/Kathi Slow   Distance (ft) 300 Feet  (Steady NO LOB)   Assistive Device Walker, rolling   Assessment   Activity Tolerance Patient tolerated treatment well   PT Plan of Care   Sunday X       Electronically signed by Yanna Bartlett PTA on 10/23/2022 at 10:38 AM

## 2022-10-23 NOTE — PROGRESS NOTES
Cardiology Daily Note Aminata Jackson MD      Patient:  Marlyse Apley  [id number]    Patient Active Problem List    Diagnosis Date Noted    Acute coronary syndrome ([address] 10/22/2022    CAD in native artery 10/22/2022    Atypical chest pain 10/20/2022    Hypercholesteremia 10/18/2022    Heart disease 10/20/2022       Admit Date:  10/20/2022    Admission Problem List: Present on Admission:   Atypical chest pain   Acute coronary syndrome [facility])   CAD in native artery      Cardiac Specific Data:  Specialty Problems          Cardiology Problems    Acute coronary syndrome (HCC)        Hypercholesteremia        * (Principal) Atypical chest pain        CAD in native artery        Heart disease           Subjective:  Ms. Bernie Schlatter seen today postoperative day #2 resting comfortably no new complaints. Sinus rhythm on the monitor blood pressure 112/65 heart 89. Objective:   /65   Pulse 89   Temp 98.8 °F (37.1 °C) (Temporal)   Resp 30   Ht 5' 5\" (1.651 m)   Wt 212 lb 1.6 oz (96.2 kg)   SpO2 91%   BMI 35.30 kg/m²       Intake/Output Summary (Last 24 hours) at 10/23/2022 1956  Last data filed at 10/23/2022 0800  Gross per 24 hour   Intake 1900.16 ml   Output 1050 ml   Net 850.16 ml       Prior to Admission medications    Medication Sig Start Date End Date Taking? Authorizing Provider   levothyroxine (SYNTHROID) 137 MCG tablet Take 137 mcg by mouth Daily    Historical Provider, MD   insulin glargine (LANTUS) 100 UNIT/ML injection vial Inject 53 Units into the skin every morning    Historical Provider, MD   primidone (MYSOLINE) 50 MG tablet Take 3 tablets in the morning and 4 tablets at night.  10/18/22   ROB Baer   HUMALOG KWIKPEN 100 UNIT/ML SOPN INJECT 20 UNITS SUBCUTANEOUSLY THREE TIMES DAILY WITH MEALS 8/25/21   Historical Provider, MD   gemfibrozil (LOPID) 600 MG tablet Take 1 tablet by mouth 2 times daily 4/18/21   Historical Provider, MD        metoprolol tartrate  25 mg Oral BID    furosemide 20 mg Oral Daily    primidone  150 mg Oral Nightly    sodium chloride flush  5-40 mL IntraVENous 2 times per day    sodium chloride flush  5-40 mL IntraVENous 2 times per day    aspirin  81 mg Oral Daily    clopidogrel  75 mg Oral Daily    atorvastatin  20 mg Oral Nightly    ipratropium-albuterol  1 ampule Inhalation Q4H WA    insulin glargine  0.15 Units/kg SubCUTAneous Nightly    insulin lispro  0-6 Units SubCUTAneous TID WC    insulin lispro  0-3 Units SubCUTAneous Nightly    [MAR Hold] primidone  200 mg Oral Nightly    [MAR Hold] insulin glargine  53 Units SubCUTAneous QAM    [MAR Hold] gemfibrozil  600 mg Oral BID    [MAR Hold] levothyroxine  137 mcg Oral QAM AC    [MAR Hold] insulin lispro  0-8 Units SubCUTAneous TID WC    [MAR Hold] insulin lispro  0-4 Units SubCUTAneous Nightly    [MAR Hold] sodium chloride flush  5-40 mL IntraVENous 2 times per day    [MAR Hold] aspirin  81 mg Oral Daily    [MAR Hold] sodium chloride flush  5-40 mL IntraVENous 2 times per day    [MAR Hold] sodium chloride flush  5-40 mL IntraVENous 2 times per day    [MAR Hold] sodium chloride flush  10 mL IntraVENous 2 times per day       TELEMETRY: Sinus     Physical Exam:      Physical Exam      General:  Awake, alert, NAD  Skin:  Warm and dry  Neck:  no jvd , no carotid bruits  Chest:  Clear to auscultation, no wheezing or rales  Cardiovascular:  RRR Z2O0 no murmurs, clicks, gallups, or rubs  Abdomen:  Soft nontender, nondistended, bowel sounds present  Extremities:  Edema: none      Lab Data:  CBC:   Recent Labs     10/21/22  1300 10/21/22  1815 10/21/22  1900 10/22/22  0600 10/23/22  0338   WBC 8.9  --   --  10.1 12.8*   HGB 9.0*   < > 10.9* 10.3* 9.4*   HCT 26.5*  --  32.5* 31.5* 29.4*   MCV 86.3  --   --  87.5 89.4     --   --  188 173    < > = values in this interval not displayed.      BMP:   Recent Labs     10/21/22  1300 10/21/22  1304 10/21/22  2109 10/22/22  0600 10/23/22  0338     --   --  142 134*   K 3.8   < > 4.5 4.1 4.5     --   --  111 103   CO2 24  --   --  20* 20*   BUN 15  --   --  17 17   CREATININE 0.7  --   --  0.6 0.9    < > = values in this interval not displayed. LIVER PROFILE: No results for input(s): AST, ALT, LIPASE, BILIDIR, BILITOT, ALKPHOS in the last 72 hours. Invalid input(s): AMYLASE,  ALB  PT/INR:   Recent Labs     10/21/22  1300 10/22/22  0600 10/23/22  0338   PROTIME 16.5* 14.8* 17.0*   INR 1.32* 1.16 1.37*     APTT:   Recent Labs     10/20/22  1421 10/20/22  1949 10/21/22  0509   APTT 94.6* 124.3* 57.4*     BNP:  No results for input(s): BNP in the last 72 hours. CK, CKMB, Troponin: @LABRCNT (CKTOTAL:3, CKMB:3, TROPONINI:3)@    IMAGING:  XR CHEST PORTABLE    Result Date: 10/23/2022  NO PRIOR REPORT AVAILABLE Exam: X-RAY OF THECHEST Clinical data:Post-operative evaluation of open heart surgery. Technique:Single view of the chest. Prior studies: Radiograph of the chest dated 10/22/2022. Findings: The ETT and [place]-Matt catheter have been removed. Median sternotomy wires. Cardiomegaly. Stable pulmonary vascular congestion. Interval development of left lower lobe infiltrates or atelectasis. noevidence of pleural effusion. No acute osseous abnormality is detected.    :  The ETT and Mcloud-Matt catheter have been removed. Median sternotomy wires. Cardiomegaly. Stable pulmonary vascular congestion. Interval development of left lower lobe infiltrates or atelectasis. Recommendation: Follow up as clinically indicated. Electronically Signed by Almaz Hercules MD at 23-Oct-2022 09:32:08 AM EST             XR CHEST PORTABLE    Result Date: 10/22/2022  Patient: Ronald Bettencourt  Time Out: 06:36Exam(s): FILM CXR 1 VIEW EXAM:  XR Chest, 1 ViewCLINICAL HISTORY:  Post op open heart surgery. TECHNIQUE:  Frontal view of the chest.COMPARISON:  10/21/2022FINDINGS:  Lungs:  Small amount of airspace opacities over  left middle lung zone.   Pleural space:  Cannot rule out small bilateral pleural effusions. No pneumothorax. Mediastinum:  Sternal wires, mediastinal clips and right jugular [place]-Matt catheter are again noted. Bones/joints: Unchanged. Tubes, lines and devices:  Endotracheal tube has been moved. Endotracheal tube has been moved. Otherwise, no substantially change. Electronically signed by Olaf Menendez M.D. on 10-22-22 at 0636    XR CHEST PORTABLE    Result Date: 10/21/2022  NO PRIOR REPORT AVAILABLE Exam: X-RAY OF [facility] Clinical data:Intra-op CABG. Technique:Single view of the chest. Prior studies: Radiograph of the chest dated 10/20/2022 image. Findings: The lungs are grossly clear; noevidence of acute infiltrate or pleural effusion. Cardiac silhouette is within normal limits. Median sternotomy wires and clips from CABG noted. ET tube in good position above the radha. Right transjugular catheter with tip in the main pulmonary artery is noted. No acute osseous abnormality is detected. There is suspicion of a left neck catheter with tip crossing the upper left lobe and producing a dense linear focus left upper lobe. Also noted is a large bore catheter in the gastric location, though origin is not clear. Wire coiled in the left upper quadrant, appears to be approaching from the midline lower chest.?? Right transjugular catheter with tip in the main pulmonary artery. ET tube in good position. Large bore catheter crossing the gastric location, origin undetermined but possibly from the left neck. CABG performed. Recommendation: Follow up short term. Electronically Signed by Rosangela Naranjo MD at 21-Oct-2022 02:45:53 PM EST             XR CHEST PORTABLE    Result Date: 10/20/2022  Patient: Brigida Segura  Time Out: 04:35Exam(s): FILM CXR 1 VIEW EXAM:  XR Chest, 1 ViewCLINICAL HISTORY:   Reason for exam: shortness of breath. TECHNIQUE:  Frontal view of the chest.COMPARISON:  No relevant prior studies available. FINDINGS:  Lungs: Increased interstitial airspace opacities throughout the lungs bilaterally. Pleural space:  Unremarkable. No pneumothorax. Heart:  No cardiomegaly. Mediastinum:  Unremarkable. Bones/joints:  Degenerative changes within the right greater than left shoulders. Increased interstitial airspace opacities throughout the lungs bilaterally. Correlation for atypical infection versus mild pulmonary vascular congestion recommended. Electronically signed by Sweetie Huang M.D. on 10-20-22 at 0843    Vascular carotid duplex bilateral    Result Date: 10/21/2022  Vascular Carotid Procedure  Demographics   Patient Name  [facility]       Age                63                CLEO   Patient       [id number]        Gender             Female  Number   Visit Number  [id number]     Interpreting       Sue Reina                              Physician   Date of Birth 1960    Referring          Israel Paredes MD                              Physician   Accession     [id number]    [place] [id number] [facility]  Number                                         RVS, RCS  Procedure Type of Study:   Cerebral:Carotid, VL CAROTID BILATERAL. Indications for Study:Pre-op CABG. Risk Factors   - The patient's risk factor(s) include: diabetes mellitus, dyslipidemia     and arterial hypertension.   - The patient's last creatinine was 0.8 mg/dl. Allergies   - No known allergies. Impression   There is mixed plaque visualized in the bilateral internal carotid  artery(ies). There is less than 50% stenosis in the right internal carotid artery. There is less than 50% stenosis of the left internal carotid artery. There is normal antegrade flow in the bilateral vertebral artery(ies). Signature   ----------------------------------------------------------------  Electronically signed by Wyatt Gibbs(Interpreting  physician) on 10/21/2022 01:47 PM  ----------------------------------------------------------------  Blood Pressure:Left arm 120/80 mmHg.  Velocities are measured in cm/s ; Diameters are measured in mm Carotid Right Measurements +------------+-------+-------+--------+-------+------------+---------------+ ! Location    ! PSV    ! EDV    ! Angle   ! RI     !%Stenosis   ! Tortuosity     ! +------------+-------+-------+--------+-------+------------+---------------+ ! Prox CCA    !73.5   !17.6   ! 60      !0.76   !            !               ! +------------+-------+-------+--------+-------+------------+---------------+ ! Mid CCA     !76.3   !21.4   !60      !0.72   !            !               ! +------------+-------+-------+--------+-------+------------+---------------+ ! Prox ICA    ! 65     !23.3   ! 60      !0.64   !            !               ! +------------+-------+-------+--------+-------+------------+---------------+ ! Mid ICA     ! 87     !33.9   !60      !0.61   !            !               ! +------------+-------+-------+--------+-------+------------+---------------+ ! Dist ICA    ! 73.7   !31.5   !60      !0.57   !            !               ! +------------+-------+-------+--------+-------+------------+---------------+ ! Prox ECA    !103    !       !61      !       !            !               ! +------------+-------+-------+--------+-------+------------+---------------+ ! Vertebral   !45.9   !15.8   !60      !0.66   !            !               ! +------------+-------+-------+--------+-------+------------+---------------+   - There is antegrade vertebral flow noted on the right side. - Additional Measurements:ICAPSV/CCAPSV 1.18. ICAEDV/CCAEDV 1.93. Carotid Left Measurements +------------+-------+-------+--------+-------+------------+---------------+ ! Location    ! PSV    ! EDV    ! Angle   ! RI     !%Stenosis   ! Tortuosity     ! +------------+-------+-------+--------+-------+------------+---------------+ ! Prox CCA    !92.9   !11.3   !60      !0.88   !            !               ! +------------+-------+-------+--------+-------+------------+---------------+ ! Mid CCA     !98.8   !19.3   ! 61 ! 0.8    !            !               ! +------------+-------+-------+--------+-------+------------+---------------+ ! Prox ICA    !55.3   !18.9   !60      !0.66   !            !               ! +------------+-------+-------+--------+-------+------------+---------------+ ! Mid ICA     ! 88.7   !33.8   !60      !0.62   !            !               ! +------------+-------+-------+--------+-------+------------+---------------+ ! Dist ICA    !68     !29.9   !60      !0.56   !            !               ! +------------+-------+-------+--------+-------+------------+---------------+ ! Prox ECA    !111    !       !61      !       !            !               ! +------------+-------+-------+--------+-------+------------+---------------+ ! Vertebral   !70.7   !20.2   ! 60      !0.71   !            !               ! +------------+-------+-------+--------+-------+------------+---------------+   - There is antegrade vertebral flow noted on the left side. - Additional Measurements:ICAPSV/CCAPSV 0.95. ICAEDV/CCAEDV 2.99. VL Vein Mapping Lower Bilateral    Result Date: 10/21/2022  Vascular Lower Extremity Vein Mapping Procedure  Demographics   Patient Name  [facility]       Age                63                CLEO   Patient       [id number]        Gender             Female  Number   Visit Number  [id number]     Interpreting       Angelo Becerra                              Physician   Date of Birth 1960    Referring          Ailyn Kauffman MD                              Physician   Accession     [id number]    Sonographer        [address]  Number                                         RVS, RCS  Procedure Type of Study:   Veins:Lower Extremity Vein Mapping, 04970, VEIN MAPPING LOWER BILATERAL . Indications for Study:Pre-op CABG. Risk Factors   - The patient's risk factor(s) include: diabetes mellitus, dyslipidemia     and arterial hypertension.   - The patient's last creatinine was 0.8 mg/dl.  Allergies   - No known allergies. Impression   Bilateral lower extremity saphenous vein mapping performed. Veins are  patent with measurements noted. Signature   ----------------------------------------------------------------  Electronically signed by Errol LeaInterpreting  physician) on 10/21/2022 01:45 PM  ----------------------------------------------------------------  Velocities are measured in cm/s ; Diameters are measured in mm +--------------------------------------++--------+-----+----+--------+-----+ ! Superficial - Great Saphenous Vein    ! ! Right   ! ! Left!        !     ! +--------------------------------------++--------+-----+----+--------+-----+ ! Location                              ! !Diameter! Depth! !Diameter! Depth! +--------------------------------------++--------+-----+----+--------+-----+ ! GSV 2 cm Distal to Junction           ! !3.8     !     !    !6.1     !     ! +--------------------------------------++--------+-----+----+--------+-----+ ! GSV High Thigh                        ! !3.8     !     !    !3.5     !     ! +--------------------------------------++--------+-----+----+--------+-----+ ! GSV Mid Thigh                         ! !4.4     !     !    !2.9     !     ! +--------------------------------------++--------+-----+----+--------+-----+ ! GSV Low Thigh                         !!4.1     !     !    !3.2     !     ! +--------------------------------------++--------+-----+----+--------+-----+ ! GSV Knee                              !!3.9     !     !    !3.6     !     ! +--------------------------------------++--------+-----+----+--------+-----+ ! GSV High Calf                         !!3.2     !     !    !3.1     !     ! +--------------------------------------++--------+-----+----+--------+-----+ ! GSV Mid Calf                          !!3.7     !     !    !3.2     !     ! +--------------------------------------++--------+-----+----+--------+-----+ ! GSV Ankle                             !!4.2     !     ! ! 4.1     !     ! +--------------------------------------++--------+-----+----+--------+-----+        Assessment:  Postoperative day #2 status post CABG x4 LIMA graft LAD vein graft to OM1 and OM2 and vein graft to main right coronary artery  Cardiac catheterization 10/20/2022 ejection fraction 35% large apical hypokinesis involving inferoapical and anteroapical, 60% left main stenosis, 100% occlusion LAD collateralized 90% second OM 95% mid right coronary artery  Echocardiogram 10/20/2022 ejection fraction 35 to 40% mild concentric LVH grade 1 diastolic dysfunction mild TR RVSP 20 mild mitral stenosis mild MR  Hyperlipidemia  Preoperative ventricular tachycardia    Plan:  Continue current management cardiovascular status stable    Miguel Ángel Galvez MD, MD 10/23/2022 9:56 AM

## 2022-10-23 NOTE — PROGRESS NOTES
Patient transferred to  via transport and RN, Telemetry placed on patient and turned on. Belongings : clothes, cell phone, Darroll Heft, taken by daughter.    Call light in place, bed alarm on. RN notified

## 2022-10-24 ENCOUNTER — APPOINTMENT (OUTPATIENT)
Dept: GENERAL RADIOLOGY | Age: 62
DRG: 234 | End: 2022-10-24

## 2022-10-24 LAB
GLUCOSE BLD-MCNC: 162 MG/DL (ref 70–99)
GLUCOSE BLD-MCNC: 217 MG/DL (ref 70–99)
GLUCOSE BLD-MCNC: 219 MG/DL (ref 70–99)
GLUCOSE BLD-MCNC: 230 MG/DL (ref 70–99)
HCT VFR BLD CALC: 27.7 % (ref 37–47)
HEMOGLOBIN: 9.1 G/DL (ref 12–16)
MCH RBC QN AUTO: 28.3 PG (ref 27–31)
MCHC RBC AUTO-ENTMCNC: 32.9 G/DL (ref 33–37)
MCV RBC AUTO: 86 FL (ref 81–99)
PDW BLD-RTO: 14 % (ref 11.5–14.5)
PERFORMED ON: ABNORMAL
PLATELET # BLD: 194 K/UL (ref 130–400)
PMV BLD AUTO: 10.3 FL (ref 9.4–12.3)
RBC # BLD: 3.22 M/UL (ref 4.2–5.4)
WBC # BLD: 11 K/UL (ref 4.8–10.8)

## 2022-10-24 PROCEDURE — 2140000000 HC CCU INTERMEDIATE R&B

## 2022-10-24 PROCEDURE — 6370000000 HC RX 637 (ALT 250 FOR IP): Performed by: HOSPITALIST

## 2022-10-24 PROCEDURE — 97116 GAIT TRAINING THERAPY: CPT

## 2022-10-24 PROCEDURE — 94640 AIRWAY INHALATION TREATMENT: CPT

## 2022-10-24 PROCEDURE — 6370000000 HC RX 637 (ALT 250 FOR IP): Performed by: SURGERY

## 2022-10-24 PROCEDURE — 99024 POSTOP FOLLOW-UP VISIT: CPT | Performed by: SURGERY

## 2022-10-24 PROCEDURE — 85027 COMPLETE CBC AUTOMATED: CPT

## 2022-10-24 PROCEDURE — 2580000003 HC RX 258: Performed by: SURGERY

## 2022-10-24 PROCEDURE — 99231 SBSQ HOSP IP/OBS SF/LOW 25: CPT | Performed by: INTERNAL MEDICINE

## 2022-10-24 PROCEDURE — 2500000003 HC RX 250 WO HCPCS: Performed by: SURGERY

## 2022-10-24 PROCEDURE — 97110 THERAPEUTIC EXERCISES: CPT

## 2022-10-24 PROCEDURE — 82947 ASSAY GLUCOSE BLOOD QUANT: CPT

## 2022-10-24 PROCEDURE — 6360000002 HC RX W HCPCS: Performed by: SURGERY

## 2022-10-24 PROCEDURE — 36415 COLL VENOUS BLD VENIPUNCTURE: CPT

## 2022-10-24 PROCEDURE — 71045 X-RAY EXAM CHEST 1 VIEW: CPT

## 2022-10-24 RX ADMIN — INSULIN LISPRO 1 UNITS: 100 INJECTION, SOLUTION INTRAVENOUS; SUBCUTANEOUS at 20:47

## 2022-10-24 RX ADMIN — PRIMIDONE 150 MG: 50 TABLET ORAL at 20:46

## 2022-10-24 RX ADMIN — INSULIN LISPRO 4 UNITS: 100 INJECTION, SOLUTION INTRAVENOUS; SUBCUTANEOUS at 16:22

## 2022-10-24 RX ADMIN — IPRATROPIUM BROMIDE AND ALBUTEROL SULFATE 1 AMPULE: 2.5; .5 SOLUTION RESPIRATORY (INHALATION) at 19:41

## 2022-10-24 RX ADMIN — IPRATROPIUM BROMIDE AND ALBUTEROL SULFATE 1 AMPULE: 2.5; .5 SOLUTION RESPIRATORY (INHALATION) at 10:30

## 2022-10-24 RX ADMIN — MAGNESIUM HYDROXIDE 30 ML: 400 SUSPENSION ORAL at 16:22

## 2022-10-24 RX ADMIN — SODIUM CHLORIDE, PRESERVATIVE FREE 10 ML: 5 INJECTION INTRAVENOUS at 09:00

## 2022-10-24 RX ADMIN — OXYCODONE 10 MG: 5 TABLET ORAL at 07:28

## 2022-10-24 RX ADMIN — METOPROLOL TARTRATE 25 MG: 25 TABLET, FILM COATED ORAL at 08:56

## 2022-10-24 RX ADMIN — IPRATROPIUM BROMIDE AND ALBUTEROL SULFATE 1 AMPULE: 2.5; .5 SOLUTION RESPIRATORY (INHALATION) at 14:28

## 2022-10-24 RX ADMIN — SODIUM CHLORIDE, PRESERVATIVE FREE 10 ML: 5 INJECTION INTRAVENOUS at 20:50

## 2022-10-24 RX ADMIN — INSULIN GLARGINE 13 UNITS: 100 INJECTION, SOLUTION SUBCUTANEOUS at 20:47

## 2022-10-24 RX ADMIN — INSULIN LISPRO 4 UNITS: 100 INJECTION, SOLUTION INTRAVENOUS; SUBCUTANEOUS at 12:10

## 2022-10-24 RX ADMIN — MORPHINE SULFATE 2 MG: 2 INJECTION, SOLUTION INTRAMUSCULAR; INTRAVENOUS at 04:51

## 2022-10-24 RX ADMIN — CLOPIDOGREL BISULFATE 75 MG: 75 TABLET ORAL at 08:56

## 2022-10-24 RX ADMIN — OXYCODONE 10 MG: 5 TABLET ORAL at 02:57

## 2022-10-24 RX ADMIN — ATORVASTATIN CALCIUM 20 MG: 20 TABLET, FILM COATED ORAL at 20:47

## 2022-10-24 RX ADMIN — IPRATROPIUM BROMIDE AND ALBUTEROL SULFATE 1 AMPULE: 2.5; .5 SOLUTION RESPIRATORY (INHALATION) at 06:35

## 2022-10-24 RX ADMIN — BUMETANIDE 0.5 MG: 0.25 INJECTION INTRAMUSCULAR; INTRAVENOUS at 08:56

## 2022-10-24 RX ADMIN — METOPROLOL TARTRATE 25 MG: 25 TABLET, FILM COATED ORAL at 20:47

## 2022-10-24 RX ADMIN — OXYCODONE 10 MG: 5 TABLET ORAL at 12:09

## 2022-10-24 RX ADMIN — MORPHINE SULFATE 2 MG: 2 INJECTION, SOLUTION INTRAMUSCULAR; INTRAVENOUS at 13:48

## 2022-10-24 RX ADMIN — MORPHINE SULFATE 2 MG: 2 INJECTION, SOLUTION INTRAMUSCULAR; INTRAVENOUS at 09:06

## 2022-10-24 RX ADMIN — BUMETANIDE 0.5 MG: 0.25 INJECTION INTRAMUSCULAR; INTRAVENOUS at 20:48

## 2022-10-24 RX ADMIN — ASPIRIN 81 MG: 81 TABLET, COATED ORAL at 08:56

## 2022-10-24 ASSESSMENT — PAIN DESCRIPTION - LOCATION
LOCATION: STERNUM;ABDOMEN
LOCATION: ABDOMEN;STERNUM
LOCATION: CHEST;INCISION
LOCATION: ABDOMEN
LOCATION: CHEST
LOCATION: ABDOMEN

## 2022-10-24 ASSESSMENT — PAIN SCALES - GENERAL
PAINLEVEL_OUTOF10: 4
PAINLEVEL_OUTOF10: 4
PAINLEVEL_OUTOF10: 0
PAINLEVEL_OUTOF10: 6
PAINLEVEL_OUTOF10: 0
PAINLEVEL_OUTOF10: 5
PAINLEVEL_OUTOF10: 0
PAINLEVEL_OUTOF10: 0
PAINLEVEL_OUTOF10: 8

## 2022-10-24 ASSESSMENT — PAIN DESCRIPTION - DESCRIPTORS
DESCRIPTORS: ACHING
DESCRIPTORS: ACHING
DESCRIPTORS: SHOOTING;SHARP

## 2022-10-24 ASSESSMENT — PAIN DESCRIPTION - ORIENTATION
ORIENTATION: MID

## 2022-10-24 ASSESSMENT — PAIN - FUNCTIONAL ASSESSMENT
PAIN_FUNCTIONAL_ASSESSMENT: ACTIVITIES ARE NOT PREVENTED
PAIN_FUNCTIONAL_ASSESSMENT: PREVENTS OR INTERFERES SOME ACTIVE ACTIVITIES AND ADLS
PAIN_FUNCTIONAL_ASSESSMENT: PREVENTS OR INTERFERES SOME ACTIVE ACTIVITIES AND ADLS

## 2022-10-24 ASSESSMENT — PAIN SCALES - WONG BAKER: WONGBAKER_NUMERICALRESPONSE: 0

## 2022-10-24 NOTE — PLAN OF CARE
Problem: Discharge Planning  Goal: Discharge to home or other facility with appropriate resources  Outcome: Progressing     Problem: Pain  Goal: Verbalizes/displays adequate comfort level or baseline comfort level  Outcome: Progressing     Problem: Cardiovascular - Adult  Goal: Maintains optimal cardiac output and hemodynamic stability  Outcome: Progressing  Goal: Absence of cardiac dysrhythmias or at baseline  Outcome: Progressing     Problem: Skin/Tissue Integrity - Adult  Goal: Skin integrity remains intact  Outcome: Progressing  Flowsheets (Taken 10/24/2022 0900)  Skin Integrity Remains Intact: Assess vascular access sites hourly  Goal: Incisions, wounds, or drain sites healing without S/S of infection  Outcome: Progressing  Flowsheets (Taken 10/24/2022 0900)  Incisions, Wounds, or Drain Sites Healing Without Sign and Symptoms of Infection: TWICE DAILY: Assess and document skin integrity  Goal: Oral mucous membranes remain intact  Outcome: Progressing  Flowsheets (Taken 10/24/2022 0900)  Oral Mucous Membranes Remain Intact: Assess oral mucosa and hygiene practices     Problem: Musculoskeletal - Adult  Goal: Return mobility to safest level of function  Outcome: Progressing  Goal: Maintain proper alignment of affected body part  Outcome: Progressing  Goal: Return ADL status to a safe level of function  Outcome: Progressing     Problem: Genitourinary - Adult  Goal: Absence of urinary retention  Outcome: Progressing  Goal: Urinary catheter remains patent  Outcome: Progressing     Problem: Infection - Adult  Goal: Absence of infection at discharge  Outcome: Progressing  Goal: Absence of infection during hospitalization  Outcome: Progressing     Problem: Metabolic/Fluid and Electrolytes - Adult  Goal: Electrolytes maintained within normal limits  Outcome: Progressing  Goal: Hemodynamic stability and optimal renal function maintained  Outcome: Progressing  Goal: Glucose maintained within prescribed range  Outcome: Progressing     Problem: Hematologic - Adult  Goal: Maintains hematologic stability  Outcome: Progressing     Problem: Chronic Conditions and Co-morbidities  Goal: Patient's chronic conditions and co-morbidity symptoms are monitored and maintained or improved  Outcome: Progressing     Problem: Safety - Adult  Goal: Free from fall injury  Outcome: Progressing     Problem: ABCDS Injury Assessment  Goal: Absence of physical injury  Outcome: Progressing

## 2022-10-24 NOTE — ACP (ADVANCE CARE PLANNING)
Advance Care Planning   Healthcare Decision Maker:    Primary Decision Maker: Eliecer Padgett Child - [phone]    Primary Decision Maker: Nabor Oreilly Child - [phone]    Primary Decision Maker: Alem Palomares - Child - [phone]    Primary Decision Maker: Kaycee Buchanan - Child - [phone]      Today we documented Decision Maker(s) consistent with Legal Next of Kin hierarchy. Patient asked that all her children work together for decisions concerning her and did not want to choose one to be primary.

## 2022-10-24 NOTE — PROGRESS NOTES
Physical Therapy  Name: Tracy Cortez  MRN:  [id number]  Date of service:  10/24/2022       10/24/22 1522   Restrictions/Precautions   Restrictions/Precautions Surgical Protocols   Required Braces or Orthoses? No   Position Activity Restriction   Sternal Precautions No Pushing; No Pulling;5# Lifting Restrictions   General   Family / Caregiver Present Yes   Referring Practitioner Geronimo England MD   Subjective   Subjective daughter states that pt has been making small trips to the bathroom   General Comment   Comments Used gait belt and pt held pillow   Oxygen Therapy   O2 Device None (Room air)   Transfers   Sit to Stand Minimal Assistance;Contact guard assistance   Stand to Sit Contact guard assistance   Comment v. cues for technique to due sternal prec. Ambulation   Surface Level tile   Device Hand-Held Assist   Assistance Minimal assistance   Quality of Gait forward flexed. Gait Deviations Slow Kathi;Decreased step length;Decreased step height   Distance 125 feet   Comments several standing rest breaks. Short Term Goals   Time Frame for Short Term Goals 2 wks   Short Term Goal 1 supine to sit indep   Short Term Goal 2 sit to stand indep   Short Term Goal 3 amb. 300'+ indep   Conditions Requiring Skilled Therapeutic Intervention   Body Structures, Functions, Activity Limitations Requiring Skilled Therapeutic Intervention Decreased functional mobility ; Decreased strength;Decreased balance; Increased pain;Decreased endurance   Assessment worked with pateint to increase posture. Treatment Diagnosis deconditioning after surgery   Discharge Recommendations Continue to assess pending progress   Activity Tolerance   Activity Tolerance Treatment limited secondary to medical complications; Patient limited by endurance; Patient limited by pain   Physcial Therapy Plan   General Plan 6-7 times per week   Current Treatment Recommendations Strengthening;ROM;Balance training;Functional mobility training;Transfer training; Endurance training; Safety education & training;Patient/Caregiver education & training;Equipment evaluation, education, & procurement; Therapeutic activities; Positioning   Safety Devices   Type of Devices Gait belt;Call light within reach; Patient at risk for falls;Nurse notified; Left in chair   PT Whiteboard Notes   Therapy Whiteboard RE 11/5 CABGx 4, sternal prec, Rudy       Electronically signed by Garcia Miller PTA on 10/24/2022 at 3:25 PM

## 2022-10-24 NOTE — PROGRESS NOTES
Progress Note    10/24/2022 7:10 AM          POD#   3    Subjective:  Ms. Arvind Maxwell is doing well. Able to walk 300 ft. PULSE OXIMETRY RANGE: SpO2  Av.9 %  Min: 85 %  Max: 97 %  SUPPLEMENTAL O2: O2 Flow Rate (L/min): 1 L/min   Vital Signs: /66   Pulse 91   Temp 97.3 °F (36.3 °C) (Temporal)   Resp 17   Ht 5' 5\" (1.651 m)   Wt 206 lb 3 oz (93.5 kg)   SpO2 90%   BMI 34.31 kg/m²    Temperature Range:   Temp: 97.3 °F (36.3 °C)   Temp  Av.2 °F (36.8 °C)  Min: 97.3 °F (36.3 °C)  Max: 98.8 °F (37.1 °C)                   Rhythm: normal sinus rhythm    Labs:   ABG:    Lab Results   Component Value Date/Time    PHART 7.370 10/21/2022 03:21 PM    PO2ART 149.0 10/21/2022 03:21 PM    TVH9GYH 41.0 10/21/2022 03:21 PM    B9NATNEI 97.0 10/21/2022 03:21 PM    YLF5OHC 25 10/21/2022 11:50 AM    ZBO1HIZ 23.7 10/21/2022 03:21 PM    BEART -1.5 10/21/2022 03:21 PM     CBC:   Recent Labs     10/22/22  0600 10/23/22  0338 10/24/22  0421   WBC 10.1 12.8* 11.0*   HGB 10.3* 9.4* 9.1*   HCT 31.5* 29.4* 27.7*   MCV 87.5 89.4 86.0    173 194     BMP:   Recent Labs     10/21/22  1300 10/21/22  1304 10/21/22  2109 10/22/22  0600 10/23/22  0338     --   --  142 134*   K 3.8   < > 4.5 4.1 4.5     --   --  111 103   CO2 24  --   --  20* 20*   BUN 15  --   --  17 17   CREATININE 0.7  --   --  0.6 0.9    < > = values in this interval not displayed. PT/INR:   Recent Labs     10/21/22  1300 10/22/22  0600 10/23/22  0338   PROTIME 16.5* 14.8* 17.0*   INR 1.32* 1.16 1.37*     APTT: No results for input(s): APTT in the last 72 hours. Chest X-Ray:  Left lung well expanded with some WILLAM atelectasis, no effusion or ptx after chest tube removal.   CT:  I/O last 3 completed shifts: In: 5031 [P.O.:1120; I.V.:75]  Out: 5846 [Urine:2475]      I/O last 3 completed shifts: In: 7222 [P.O.:1120;  I.V.:75]  Out: 2475 [Urine:2475]  Scheduled Meds:    bumetanide  0.5 mg IntraVENous BID    insulin lispro  0-16 Units SubCUTAneous TID WC    insulin lispro  0-4 Units SubCUTAneous Nightly    metoprolol tartrate  25 mg Oral BID    primidone  150 mg Oral Nightly    sodium chloride flush  5-40 mL IntraVENous 2 times per day    aspirin  81 mg Oral Daily    clopidogrel  75 mg Oral Daily    atorvastatin  20 mg Oral Nightly    ipratropium-albuterol  1 ampule Inhalation Q4H WA    insulin glargine  0.15 Units/kg SubCUTAneous Nightly    insulin lispro  0-3 Units SubCUTAneous Nightly    [MAR Hold] primidone  200 mg Oral Nightly    [MAR Hold] insulin glargine  53 Units SubCUTAneous QAM    [MAR Hold] gemfibrozil  600 mg Oral BID    [MAR Hold] levothyroxine  137 mcg Oral QAM AC    [MAR Hold] insulin lispro  0-8 Units SubCUTAneous TID WC    [MAR Hold] insulin lispro  0-4 Units SubCUTAneous Nightly    [MAR Hold] aspirin  81 mg Oral Daily    [MAR Hold] sodium chloride flush  10 mL IntraVENous 2 times per day     Continuous Infusions:    sodium chloride      dextrose       Exam:   General appearance: NAD  Neck: no bruits, no JVD, No lymph nodes   Lungs: no rhonchi, no wheezes, no rales   Sternum: stable, dressing dry and intact    Heart: S1 and S2 no murmer, + rub  Abdomen: positive bowel sounds, no bruits, no masses  Extremities: warm and dry, no cyanosis, no clubbing  Leg Wounds:  Dressing dry and intact    Assessment  SP CABG x 4 for low EF. Tolerating b-blocker. NSR in the 90's. Diuresing well. I/O's not accurate due to voss removal, which is suboptimal in a patient with low EF where strict I/O's are necessary for optimal management. However, she is oxygenating well and likely has a good fluid balance. Afebrile, WBC ct normal.  Glucose is consistently high. Will restart home regimen now that she is eating normally. Anticipate d/c home once glucose control is reasonable.       Patient Active Problem List   Diagnosis    Hypercholesteremia    Atypical chest pain    Heart disease    Acute coronary syndrome ([address]    CAD in native artery MD Thiago Ron MD

## 2022-10-24 NOTE — PROGRESS NOTES
Physical Therapy  Name: Steph Cook  MRN:  [id number]  Date of service:  10/24/2022       10/24/22 1041   Restrictions/Precautions   Restrictions/Precautions Surgical Protocols   Required Braces or Orthoses? No   Position Activity Restriction   Sternal Precautions No Pushing; No Pulling;5# Lifting Restrictions   General   Family / Caregiver Present Yes   Referring Practitioner Natalee Gregorio MD   Subjective   Subjective I can try walking. Wait am I walking by myself? No wheelchair? General Comment   Comments Used gait belt and pt held pillow   Oxygen Therapy   O2 Device None (Room air)   Transfers   Sit to Stand Minimal Assistance;Contact guard assistance   Stand to Sit Contact guard assistance   Comment v. cues for technique to due sternal prec. Ambulation   Surface Level tile   Device Hand-Held Assist   Assistance Minimal assistance   Quality of Gait forward flexed. Gait Deviations Slow Kathi;Decreased step length;Decreased step height   Distance 100 feet   Comments several standing rest breaks. Short Term Goals   Time Frame for Short Term Goals 2 wks   Short Term Goal 1 supine to sit indep   Short Term Goal 2 sit to stand indep   Short Term Goal 3 amb. 300'+ indep   Conditions Requiring Skilled Therapeutic Intervention   Body Structures, Functions, Activity Limitations Requiring Skilled Therapeutic Intervention Decreased functional mobility ; Decreased strength;Decreased balance; Increased pain;Decreased endurance   Treatment Diagnosis deconditioning after surgery   Discharge Recommendations Continue to assess pending progress   Activity Tolerance   Activity Tolerance Treatment limited secondary to medical complications; Patient limited by endurance; Patient limited by pain   Physcial Therapy Plan   General Plan 6-7 times per week   Current Treatment Recommendations Strengthening;ROM;Balance training;Functional mobility training;Transfer training; Endurance training; Safety education & training;Patient/Caregiver education & training;Equipment evaluation, education, & procurement; Therapeutic activities; Positioning   PT Plan of Care   Monday X   Safety Devices   Type of Devices Gait belt;Call light within reach; Patient at risk for falls;Nurse notified; Left in chair   PT Whiteboard Notes   Therapy Whiteboard RE 11/5 CABGx 4, sternal prec, Rudy       Electronically signed by Vikki Mckay PTA on 10/24/2022 at 10:49 AM

## 2022-10-24 NOTE — CARE COORDINATION
10/24/22 0909   Service Assessment   Patient Orientation Alert and Oriented   Cognition Alert   History Provided By Patient   Primary Caregiver Self   Accompanied By/Relationship sister present at bedside   Support Systems Children;Family Members   Patient's Healthcare Decision Maker is: Legal Next of Arnol Levy   PCP Verified by CM Yes   Last Visit to PCP Within last 6 months   Prior Functional Level Independent in ADLs/IADLs   Current Functional Level Independent in ADLs/IADLs   Can patient return to prior living arrangement Yes   Ability to make needs known: Good   Family able to assist with home care needs: Yes   Would you like for me to discuss the discharge plan with any other family members/significant others, and if so, who? Yes  (children and siblings)   Financial Resources Other (Comment)  (has no insurance and over income for [employer]; is self pay)   Social/Functional History   Lives With Alone   Type of [address] [facility] [address] One level   Home Access Stairs to enter with rails   Entrance Stairs - Number of Steps 2   Entrance Stairs - Rails Both   Bathroom Shower/Tub Tub/Shower unit   Bathroom Toilet Standard   Bathroom Accessibility Accessible;Walker accessible   Home Equipment Walker, standard  (attempting to find shower chair)   Receives Help From [address]  (uses RW)   Transfer Assistance Independent   Active  No   Patient's  Info no driving post op until cleared by CTS   Discharge Planning   Type of [address] [place] [address] Prior To Admission None   Potential Assistance Needed N/A   DME Ordered? No  (pt received a RW from a family member and also looking for shower chair to purchase from [employer] to use at home.)   Potential Assistance Purchasing Medications Yes  (has no insurance but reports able to self pay and uses mail order usually for better costs;  Pt request using [facility] for dc meds)   Type of Home Care Services None   [facility]   Services At/After Discharge None   [facility] Information Provided? No   Mode of Transport at Discharge Self     Pt and sister present at bedside report pt is returning home upon dc with family staying to assist as necessary. Pt is ambulating with PT over 300' using a RW and CGA. Pt is self pay and reports has gotten a RW form a family member to use at dc. Pt sister states another family member is currently looking at [employer] for a shower chair for pt. Pt reports using mail order for scripts and request using [employer] for dc meds. Pt reports will be able to afford meds upon dc. Pt denies further dc needs at this time.

## 2022-10-25 ENCOUNTER — APPOINTMENT (OUTPATIENT)
Dept: GENERAL RADIOLOGY | Age: 62
DRG: 234 | End: 2022-10-25

## 2022-10-25 LAB
ALBUMIN SERPL-MCNC: 3.1 G/DL (ref 3.5–5.2)
ALP BLD-CCNC: 51 U/L (ref 35–104)
ALT SERPL-CCNC: 16 U/L (ref 5–33)
ANION GAP SERPL CALCULATED.3IONS-SCNC: 14 MMOL/L (ref 7–19)
AST SERPL-CCNC: 22 U/L (ref 5–32)
BILIRUB SERPL-MCNC: 0.3 MG/DL (ref 0.2–1.2)
BUN BLDV-MCNC: 17 MG/DL (ref 8–23)
CALCIUM SERPL-MCNC: 8.6 MG/DL (ref 8.8–10.2)
CHLORIDE BLD-SCNC: 98 MMOL/L (ref 98–111)
CO2: 23 MMOL/L (ref 22–29)
CREAT SERPL-MCNC: 0.6 MG/DL (ref 0.5–0.9)
GFR SERPL CREATININE-BSD FRML MDRD: >60 ML/MIN/{1.73_M2}
GLUCOSE BLD-MCNC: 169 MG/DL (ref 70–99)
GLUCOSE BLD-MCNC: 193 MG/DL (ref 70–99)
GLUCOSE BLD-MCNC: 238 MG/DL (ref 70–99)
GLUCOSE BLD-MCNC: 244 MG/DL (ref 70–99)
GLUCOSE BLD-MCNC: 255 MG/DL (ref 74–109)
HCT VFR BLD CALC: 27.5 % (ref 37–47)
HEMOGLOBIN: 9.2 G/DL (ref 12–16)
MCH RBC QN AUTO: 28.5 PG (ref 27–31)
MCHC RBC AUTO-ENTMCNC: 33.5 G/DL (ref 33–37)
MCV RBC AUTO: 85.1 FL (ref 81–99)
PDW BLD-RTO: 14 % (ref 11.5–14.5)
PERFORMED ON: ABNORMAL
PLATELET # BLD: 261 K/UL (ref 130–400)
PMV BLD AUTO: 10 FL (ref 9.4–12.3)
POTASSIUM SERPL-SCNC: 3.7 MMOL/L (ref 3.5–5)
RBC # BLD: 3.23 M/UL (ref 4.2–5.4)
SODIUM BLD-SCNC: 135 MMOL/L (ref 136–145)
TOTAL PROTEIN: 6.5 G/DL (ref 6.6–8.7)
WBC # BLD: 6.2 K/UL (ref 4.8–10.8)

## 2022-10-25 PROCEDURE — 80053 COMPREHEN METABOLIC PANEL: CPT

## 2022-10-25 PROCEDURE — 2140000000 HC CCU INTERMEDIATE R&B

## 2022-10-25 PROCEDURE — 71045 X-RAY EXAM CHEST 1 VIEW: CPT

## 2022-10-25 PROCEDURE — 99024 POSTOP FOLLOW-UP VISIT: CPT | Performed by: SURGERY

## 2022-10-25 PROCEDURE — 6370000000 HC RX 637 (ALT 250 FOR IP): Performed by: SURGERY

## 2022-10-25 PROCEDURE — 6360000002 HC RX W HCPCS: Performed by: SURGERY

## 2022-10-25 PROCEDURE — 2580000003 HC RX 258: Performed by: SURGERY

## 2022-10-25 PROCEDURE — 36415 COLL VENOUS BLD VENIPUNCTURE: CPT

## 2022-10-25 PROCEDURE — 82947 ASSAY GLUCOSE BLOOD QUANT: CPT

## 2022-10-25 PROCEDURE — 85027 COMPLETE CBC AUTOMATED: CPT

## 2022-10-25 PROCEDURE — 97116 GAIT TRAINING THERAPY: CPT

## 2022-10-25 PROCEDURE — 94640 AIRWAY INHALATION TREATMENT: CPT

## 2022-10-25 RX ORDER — PRIMIDONE 50 MG/1
150 TABLET ORAL 2 TIMES DAILY
Status: DISCONTINUED | OUTPATIENT
Start: 2022-10-25 | End: 2022-10-26 | Stop reason: HOSPADM

## 2022-10-25 RX ORDER — BUMETANIDE 1 MG/1
2 TABLET ORAL 2 TIMES DAILY
Status: DISCONTINUED | OUTPATIENT
Start: 2022-10-25 | End: 2022-10-26 | Stop reason: HOSPADM

## 2022-10-25 RX ORDER — BUMETANIDE 1 MG/1
2 TABLET ORAL DAILY
Status: DISCONTINUED | OUTPATIENT
Start: 2022-10-25 | End: 2022-10-25

## 2022-10-25 RX ADMIN — BUMETANIDE 2 MG: 1 TABLET ORAL at 11:09

## 2022-10-25 RX ADMIN — METOPROLOL TARTRATE 25 MG: 25 TABLET, FILM COATED ORAL at 11:10

## 2022-10-25 RX ADMIN — BUMETANIDE 2 MG: 1 TABLET ORAL at 20:32

## 2022-10-25 RX ADMIN — OXYCODONE 5 MG: 5 TABLET ORAL at 04:13

## 2022-10-25 RX ADMIN — SODIUM CHLORIDE, PRESERVATIVE FREE 10 ML: 5 INJECTION INTRAVENOUS at 14:33

## 2022-10-25 RX ADMIN — OXYCODONE 5 MG: 5 TABLET ORAL at 11:45

## 2022-10-25 RX ADMIN — IPRATROPIUM BROMIDE AND ALBUTEROL SULFATE 1 AMPULE: 2.5; .5 SOLUTION RESPIRATORY (INHALATION) at 18:18

## 2022-10-25 RX ADMIN — ASPIRIN 81 MG: 81 TABLET, COATED ORAL at 11:09

## 2022-10-25 RX ADMIN — METOPROLOL TARTRATE 25 MG: 25 TABLET, FILM COATED ORAL at 20:32

## 2022-10-25 RX ADMIN — ATORVASTATIN CALCIUM 20 MG: 20 TABLET, FILM COATED ORAL at 20:32

## 2022-10-25 RX ADMIN — CLOPIDOGREL BISULFATE 75 MG: 75 TABLET ORAL at 11:08

## 2022-10-25 RX ADMIN — OXYCODONE 10 MG: 5 TABLET ORAL at 23:18

## 2022-10-25 RX ADMIN — IPRATROPIUM BROMIDE AND ALBUTEROL SULFATE 1 AMPULE: 2.5; .5 SOLUTION RESPIRATORY (INHALATION) at 10:17

## 2022-10-25 RX ADMIN — IPRATROPIUM BROMIDE AND ALBUTEROL SULFATE 1 AMPULE: 2.5; .5 SOLUTION RESPIRATORY (INHALATION) at 06:26

## 2022-10-25 RX ADMIN — INSULIN GLARGINE 13 UNITS: 100 INJECTION, SOLUTION SUBCUTANEOUS at 20:31

## 2022-10-25 RX ADMIN — PRIMIDONE 150 MG: 50 TABLET ORAL at 17:49

## 2022-10-25 RX ADMIN — INSULIN LISPRO 4 UNITS: 100 INJECTION, SOLUTION INTRAVENOUS; SUBCUTANEOUS at 11:46

## 2022-10-25 RX ADMIN — MORPHINE SULFATE 2 MG: 2 INJECTION, SOLUTION INTRAMUSCULAR; INTRAVENOUS at 14:32

## 2022-10-25 RX ADMIN — SODIUM CHLORIDE, PRESERVATIVE FREE 10 ML: 5 INJECTION INTRAVENOUS at 20:32

## 2022-10-25 RX ADMIN — IPRATROPIUM BROMIDE AND ALBUTEROL SULFATE 1 AMPULE: 2.5; .5 SOLUTION RESPIRATORY (INHALATION) at 14:10

## 2022-10-25 RX ADMIN — INSULIN LISPRO 1 UNITS: 100 INJECTION, SOLUTION INTRAVENOUS; SUBCUTANEOUS at 20:31

## 2022-10-25 ASSESSMENT — PAIN DESCRIPTION - DESCRIPTORS
DESCRIPTORS: ACHING
DESCRIPTORS: SHARP;ACHING

## 2022-10-25 ASSESSMENT — PAIN SCALES - GENERAL
PAINLEVEL_OUTOF10: 8
PAINLEVEL_OUTOF10: 3
PAINLEVEL_OUTOF10: 4
PAINLEVEL_OUTOF10: 4
PAINLEVEL_OUTOF10: 3
PAINLEVEL_OUTOF10: 5

## 2022-10-25 ASSESSMENT — PAIN DESCRIPTION - ORIENTATION
ORIENTATION: MID

## 2022-10-25 ASSESSMENT — PAIN DESCRIPTION - LOCATION
LOCATION: STERNUM
LOCATION: CHEST

## 2022-10-25 ASSESSMENT — PAIN SCALES - WONG BAKER: WONGBAKER_NUMERICALRESPONSE: 0

## 2022-10-25 NOTE — PROGRESS NOTES
Physical Therapy  Name: America Cast  MRN:  [id number]  Date of service:  10/25/2022     10/25/22 1427   Subjective   Subjective Attempt: Pt states she has just walked in hallway, wants to rest at this time. Will continue to follow.          Electronically signed by Robby Rollins PTA on 10/25/2022 at 2:26 PM

## 2022-10-25 NOTE — PROGRESS NOTES
Cardiology Daily Note Aminata Jackson MD      Patient:  Marlyse Apley  [id number]    Patient Active Problem List    Diagnosis Date Noted    Acute coronary syndrome ([address] 10/22/2022    CAD in native artery 10/22/2022    Atypical chest pain 10/20/2022    Hypercholesteremia 10/18/2022    Heart disease 10/20/2022       Admit Date:  10/20/2022    Admission Problem List: Present on Admission:   Atypical chest pain   Acute coronary syndrome [facility])   CAD in native artery      Cardiac Specific Data:  Specialty Problems          Cardiology Problems    Acute coronary syndrome (HCC)        Hypercholesteremia        * (Principal) Atypical chest pain        CAD in native artery        Heart disease           Subjective:  Ms. Bernie Schlatter seen today postoperative day #4 following CABG blood pressure 108/68 heart 94. Sinus rhythm on the monitor. Ambulating without too much difficulty. No other complaints or issues reported. Objective:   /68   Pulse 94   Temp 98.1 °F (36.7 °C) (Temporal)   Resp 16   Ht 5' 5\" (1.651 m)   Wt 202 lb (91.6 kg)   SpO2 100%   BMI 33.61 kg/m²       Intake/Output Summary (Last 24 hours) at 10/25/2022 1641  Last data filed at 10/25/2022 1432  Gross per 24 hour   Intake 1390 ml   Output 700 ml   Net 690 ml       Prior to Admission medications    Medication Sig Start Date End Date Taking? Authorizing Provider   levothyroxine (SYNTHROID) 137 MCG tablet Take 137 mcg by mouth Daily    Historical Provider, MD   insulin glargine (LANTUS) 100 UNIT/ML injection vial Inject 53 Units into the skin every morning    Historical Provider, MD   primidone (MYSOLINE) 50 MG tablet Take 3 tablets in the morning and 4 tablets at night.  10/18/22   Gastonwilberto Guevara, APRLEELA   HUMALOG KWIKPEN 100 UNIT/ML SOPN INJECT 20 UNITS SUBCUTANEOUSLY THREE TIMES DAILY WITH MEALS 8/25/21   Historical Provider, MD   gemfibrozil (LOPID) 600 MG tablet Take 1 tablet by mouth 2 times daily 4/18/21   Historical Provider, MD primidone  150 mg Oral BID    bumetanide  2 mg Oral BID    insulin lispro  0-16 Units SubCUTAneous TID WC    insulin lispro  0-4 Units SubCUTAneous Nightly    metoprolol tartrate  25 mg Oral BID    sodium chloride flush  5-40 mL IntraVENous 2 times per day    aspirin  81 mg Oral Daily    clopidogrel  75 mg Oral Daily    atorvastatin  20 mg Oral Nightly    ipratropium-albuterol  1 ampule Inhalation Q4H WA    insulin glargine  0.15 Units/kg SubCUTAneous Nightly    insulin lispro  0-3 Units SubCUTAneous Nightly    [MAR Hold] primidone  200 mg Oral Nightly    [MAR Hold] insulin glargine  53 Units SubCUTAneous QAM    [MAR Hold] gemfibrozil  600 mg Oral BID    [MAR Hold] levothyroxine  137 mcg Oral QAM AC    [MAR Hold] insulin lispro  0-8 Units SubCUTAneous TID WC    [MAR Hold] insulin lispro  0-4 Units SubCUTAneous Nightly    [MAR Hold] aspirin  81 mg Oral Daily    [MAR Hold] sodium chloride flush  10 mL IntraVENous 2 times per day       TELEMETRY: Sinus     Physical Exam:      Physical Exam      General:  Awake, alert, NAD  Skin:  Warm and dry  Neck:  no jvd , no carotid bruits  Chest:  Clear to auscultation, no wheezing or rales  Cardiovascular:  RRR D0M7 no murmurs, clicks, gallups, or rubs  Abdomen:  Soft nontender, nondistended, bowel sounds present  Extremities:  Edema: none    Lab Data:  CBC:   Recent Labs     10/23/22  0338 10/24/22  0421 10/25/22  0947   WBC 12.8* 11.0* 6.2   HGB 9.4* 9.1* 9.2*   HCT 29.4* 27.7* 27.5*   MCV 89.4 86.0 85.1    194 261     BMP:   Recent Labs     10/23/22  0338 10/25/22  0947   * 135*   K 4.5 3.7    98   CO2 20* 23   BUN 17 17   CREATININE 0.9 0.6     LIVER PROFILE:   Recent Labs     10/25/22  0947   AST 22   ALT 16   BILITOT 0.3   ALKPHOS 51     PT/INR:   Recent Labs     10/23/22  0338   PROTIME 17.0*   INR 1.37*     APTT: No results for input(s): APTT in the last 72 hours. BNP:  No results for input(s): BNP in the last 72 hours.   CK, CKMB, Troponin: @LABRCNT (CKTOTAL:3, CKMB:3, TROPONINI:3)@    IMAGING:  XR CHEST PORTABLE    Result Date: 10/25/2022  Patient: Amna Perry  Time Out: 06:00Exam(s): FILM CXR 1 VIEW EXAM:  XR Chest, 1 ViewCLINICAL HISTORY:   Reason for exam: Post op open heart surgery. TECHNIQUE:  Frontal view of the chest.COMPARISON: Yesterday     FINDINGS/IMPRESSION:Mild-moderate left and mild right pleural effusions. Mild pulmonary vascular congestion. Cardiomegaly. Findings are consistent with congestive heart failure. Similar in appearance of CXR from yesterday. No pneumothorax. Sternotomy wires. Electronically signed by Holly Noel MD on 10-25-22 at 0600    XR CHEST PORTABLE    Result Date: 10/24/2022  Patient: Amna Perry  Time Out: 04:55Exam(s): FILM CXR 1 VIEW EXAM:  XR Chest, 1 ViewCLINICAL HISTORY:   Reason for exam: Post op open heart surgery. TECHNIQUE:  Frontal view of the chest.COMPARISON:  No relevant prior studies available. FINDINGS:  Lungs:  Unchanged left upper lung airspace disease. Findings may be infectious in nature. Pleural space:  Unchanged left pleural effusion. No pneumothorax. Heart:  Cardiomegaly. Mediastinum:  Unremarkable. Bones/joints:  Sternotomy changes. Tubes,  lines and devices:  Interval removal of right internal jugular approach central venous catheter. Upper abdomen:  Surgical material within the left hemiabdomen. 1.  Interval removal of right internal jugular approach central venous catheter. 2. Redemonstrated left pleural effusion with left upper lung airspace disease. 3. Cardiomegaly. Electronically signed by Monae Frey MD on 10-24-22 at 0455    XR CHEST PORTABLE    Result Date: 10/23/2022  NO PRIOR REPORT AVAILABLE Exam: X-RAY OF THECHEST Clinical data:Post-operative evaluation of open heart surgery. Technique:Single view of the chest. Prior studies: Radiograph of the chest dated 10/22/2022. Findings: The ETT and [place]-Matt catheter have been removed. Median sternotomy wires. Cardiomegaly. Stable pulmonary vascular congestion. Interval development of left lower lobe infiltrates or atelectasis. noevidence of pleural effusion. No acute osseous abnormality is detected.    :  The ETT and [place] catheter have been removed. Median sternotomy wires. Cardiomegaly. Stable pulmonary vascular congestion. Interval development of left lower lobe infiltrates or atelectasis. Recommendation: Follow up as clinically indicated. Electronically Signed by Julissa Beard MD at 23-Oct-2022 09:32:08 AM EST             XR CHEST PORTABLE    Result Date: 10/22/2022  Patient: Shantel Eaton  Time Out: 06:36Exam(s): FILM CXR 1 VIEW EXAM:  XR Chest, 1 ViewCLINICAL HISTORY:  Post op open heart surgery. TECHNIQUE:  Frontal view of the chest.COMPARISON:  10/21/2022FINDINGS:  Lungs:  Small amount of airspace opacities over  left middle lung zone. Pleural space:  Cannot rule out small bilateral pleural effusions. No pneumothorax. Mediastinum:  Sternal wires, mediastinal clips and right jugular [place] catheter are again noted. Bones/joints: Unchanged. Tubes, lines and devices:  Endotracheal tube has been moved. Endotracheal tube has been moved. Otherwise, no substantially change. Electronically signed by Margarito Uribe M.D. on 10-22-22 at 0636    XR CHEST PORTABLE    Result Date: 10/21/2022  NO PRIOR REPORT AVAILABLE Exam: [facility] Clinical data:Intra-op CABG. Technique:Single view of the chest. Prior studies: Radiograph of the chest dated 10/20/2022 image. Findings: The lungs are grossly clear; noevidence of acute infiltrate or pleural effusion. Cardiac silhouette is within normal limits. Median sternotomy wires and clips from CABG noted. ET tube in good position above the radha. Right transjugular catheter with tip in the main pulmonary artery is noted. No acute osseous abnormality is detected.  There is suspicion of a left neck catheter with tip crossing the upper left lobe and producing a dense linear focus left upper lobe. Also noted is a large bore catheter in the gastric location, though origin is not clear. Wire coiled in the left upper quadrant, appears to be approaching from the midline lower chest.?? Right transjugular catheter with tip in the main pulmonary artery. ET tube in good position. Large bore catheter crossing the gastric location, origin undetermined but possibly from the left neck. CABG performed. Recommendation: Follow up short term. Electronically Signed by Micky Velásquez MD at 21-Oct-2022 02:45:53 PM EST             XR CHEST PORTABLE    Result Date: 10/20/2022  Patient: Quinton Snowlizz  Time Out: 04:35Exam(s): FILM CXR 1 VIEW EXAM:  XR Chest, 1 ViewCLINICAL HISTORY:   Reason for exam: shortness of breath. TECHNIQUE:  Frontal view of the chest.COMPARISON:  No relevant prior studies available. FINDINGS:  Lungs: Increased interstitial airspace opacities throughout the lungs bilaterally. Pleural space:  Unremarkable. No pneumothorax. Heart:  No cardiomegaly. Mediastinum:  Unremarkable. Bones/joints:  Degenerative changes within the right greater than left shoulders. Increased interstitial airspace opacities throughout the lungs bilaterally. Correlation for atypical infection versus mild pulmonary vascular congestion recommended. Electronically signed by Trisha Priest M.D. on 10-20-22 at 4915    Vascular carotid duplex bilateral    Result Date: 10/21/2022  Vascular Carotid Procedure  Demographics   Patient Name  [facility]       Age                63                CLEO   Patient       [id number]        Gender             Female  Number   Visit Number  [id number]     Interpreting       Chito Campos                              Physician   Date of Birth 1960    Referring          Osiel Alanis MD                              Physician   Accession     [id number]    [place] [address] [address] [facility]  Number                                         Armando Salazar Procedure Type of Study:   Cerebral:Carotid, VL CAROTID BILATERAL. Indications for Study:Pre-op CABG. Risk Factors   - The patient's risk factor(s) include: diabetes mellitus, dyslipidemia     and arterial hypertension.   - The patient's last creatinine was 0.8 mg/dl. Allergies   - No known allergies. Impression   There is mixed plaque visualized in the bilateral internal carotid  artery(ies). There is less than 50% stenosis in the right internal carotid artery. There is less than 50% stenosis of the left internal carotid artery. There is normal antegrade flow in the bilateral vertebral artery(ies). Signature   ----------------------------------------------------------------  Electronically signed by Errol LeaInterpreting  physician) on 10/21/2022 01:47 PM  ----------------------------------------------------------------  Blood Pressure:Left arm 120/80 mmHg. Velocities are measured in cm/s ; Diameters are measured in mm Carotid Right Measurements +------------+-------+-------+--------+-------+------------+---------------+ ! Location    ! PSV    ! EDV    ! Angle   ! RI     !%Stenosis   ! Tortuosity     ! +------------+-------+-------+--------+-------+------------+---------------+ ! Prox CCA    !73.5   !17.6   ! 60      !0.76   !            !               ! +------------+-------+-------+--------+-------+------------+---------------+ ! Mid CCA     !76.3   !21.4   !60      !0.72   !            !               ! +------------+-------+-------+--------+-------+------------+---------------+ ! Prox ICA    ! 65     !23.3   ! 60      !0.64   !            !               ! +------------+-------+-------+--------+-------+------------+---------------+ ! Mid ICA     ! 87     !33.9   !60      !0.61   !            !               ! +------------+-------+-------+--------+-------+------------+---------------+ ! Dist ICA    ! 73.7   !31.5   !60      !0.57   !            !               ! +------------+-------+-------+--------+-------+------------+---------------+ ! Prox ECA    !103    !       !61      !       !            !               ! +------------+-------+-------+--------+-------+------------+---------------+ ! Vertebral   !45.9   !15.8   !60      !0.66   !            !               ! +------------+-------+-------+--------+-------+------------+---------------+   - There is antegrade vertebral flow noted on the right side. - Additional Measurements:ICAPSV/CCAPSV 1.18. ICAEDV/CCAEDV 1.93. Carotid Left Measurements +------------+-------+-------+--------+-------+------------+---------------+ ! Location    ! PSV    ! EDV    ! Angle   ! RI     !%Stenosis   ! Tortuosity     ! +------------+-------+-------+--------+-------+------------+---------------+ ! Prox CCA    !92.9   !11.3   !60      !0.88   !            !               ! +------------+-------+-------+--------+-------+------------+---------------+ ! Mid CCA     !98.8   !19.3   !60      !0.8    ! !               ! +------------+-------+-------+--------+-------+------------+---------------+ ! Prox ICA    !55.3   !18.9   !60      !0.66   !            !               ! +------------+-------+-------+--------+-------+------------+---------------+ ! Mid ICA     ! 88.7   !33.8   !60      !0.62   !            !               ! +------------+-------+-------+--------+-------+------------+---------------+ ! Dist ICA    !68     !29.9   !60      !0.56   !            !               ! +------------+-------+-------+--------+-------+------------+---------------+ ! Prox ECA    !111    !       !61      !       !            !               ! +------------+-------+-------+--------+-------+------------+---------------+ ! Vertebral   !70.7   !20.2   ! 60      !0.71   !            !               ! +------------+-------+-------+--------+-------+------------+---------------+   - There is antegrade vertebral flow noted on the left side.    - Additional Measurements:ICAPSV/CCAPSV 0.95. ICAEDV/CCAEDV 2.99. VL Vein Mapping Lower Bilateral    Result Date: 10/21/2022  Vascular Lower Extremity Vein Mapping Procedure  Demographics   Patient Name  [facility]       Age                63                CLEO   Patient       [id number]        Gender             Female  Number   Visit Number  [id number]     Interpreting       Pedro Vigil                              Physician   Date of Birth 1960    Referring          Luke Levy MD                              Physician   Accession     [id number]    Sonographer        Arron Connor [address] [facility]  Number                                         RVS, RCS  Procedure Type of Study:   Veins:Lower Extremity Vein Mapping, [address], VEIN MAPPING LOWER BILATERAL . Indications for Study:Pre-op CABG. Risk Factors   - The patient's risk factor(s) include: diabetes mellitus, dyslipidemia     and arterial hypertension.   - The patient's last creatinine was 0.8 mg/dl. Allergies   - No known allergies. Impression   Bilateral lower extremity saphenous vein mapping performed. Veins are  patent with measurements noted. Signature   ----------------------------------------------------------------  Electronically signed by Taina Gibbs(Interpreting  physician) on 10/21/2022 01:45 PM  ----------------------------------------------------------------  Velocities are measured in cm/s ; Diameters are measured in mm +--------------------------------------++--------+-----+----+--------+-----+ ! Superficial - Great Saphenous Vein    ! ! Right   ! ! Left!        !     ! +--------------------------------------++--------+-----+----+--------+-----+ ! Location                              ! !Diameter! Depth! !Diameter! Depth! +--------------------------------------++--------+-----+----+--------+-----+ ! GSV 2 cm Distal to Junction           ! !3.8     !     !    !6.1     ! ! +--------------------------------------++--------+-----+----+--------+-----+ ! GSV High Thigh                        ! !3.8     !     !    !3.5     !     ! +--------------------------------------++--------+-----+----+--------+-----+ ! GSV Mid Thigh                         ! !4.4     !     !    !2.9     !     ! +--------------------------------------++--------+-----+----+--------+-----+ ! GSV Low Thigh                         !!4.1     !     !    !3.2     !     ! +--------------------------------------++--------+-----+----+--------+-----+ ! GSV Knee                              !!3.9     !     !    !3.6     !     ! +--------------------------------------++--------+-----+----+--------+-----+ ! GSV High Calf                         !!3.2     !     !    !3.1     !     ! +--------------------------------------++--------+-----+----+--------+-----+ ! GSV Mid Calf                          !!3.7     !     !    !3.2     !     ! +--------------------------------------++--------+-----+----+--------+-----+ ! GSV Ankle                             !!4.2     !     !    !4.1     !     ! +--------------------------------------++--------+-----+----+--------+-----+        Assessment:  Postoperative day #4 status post CABG x4 LIMA graft LAD vein graft to OM1 and OM2 and vein graft to main right coronary artery 10/21/2022  Cardiac catheterization 10/20/2022 ejection fraction 35% large apical hypokinesis involving inferoapical and anteroapical, 60% left main stenosis, 100% occlusion LAD collateralized 90% second OM 95% mid right coronary artery  Echocardiogram 10/20/2022 ejection fraction 35 to 40% mild concentric LVH grade 1 diastolic dysfunction mild TR RVSP 20 mild mitral stenosis mild MR  Hyperlipidemia  Preoperative ventricular tachycardia    Plan:  Continue current treatment progressively ambulate stable at this time    Rosalva Goldberg MD, MD 10/25/2022 4:41 PM

## 2022-10-25 NOTE — PROGRESS NOTES
Pacing wires removed without event. Bumex increased due to leg edema. She is still having sternal pain - no instability.

## 2022-10-25 NOTE — PROGRESS NOTES
Progress Note    10/25/2022 7:50 AM          POD#   4    Subjective:  Ms. Berry Temple is able to ambulate. Still having significant pain being treated with frequent percocet and morphine doses. PULSE OXIMETRY RANGE: SpO2  Av.8 %  Min: 93 %  Max: 95 %  SUPPLEMENTAL O2: O2 Flow Rate (L/min): 1 L/min   Vital Signs: /64   Pulse 90   Temp 98.3 °F (36.8 °C) (Temporal)   Resp 18   Ht 5' 5\" (1.651 m)   Wt 202 lb (91.6 kg)   SpO2 93%   BMI 33.61 kg/m²    Temperature Range:   Temp: 98.3 °F (36.8 °C)   Temp  Av.2 °F (36.8 °C)  Min: 97.9 °F (36.6 °C)  Max: 98.4 °F (36.9 °C)         Rhythm: normal sinus rhythm    Labs:   ABG:    Lab Results   Component Value Date/Time    PHART 7.370 10/21/2022 03:21 PM    PO2ART 149.0 10/21/2022 03:21 PM    IBC0LUH 41.0 10/21/2022 03:21 PM    B4QEKAUG 97.0 10/21/2022 03:21 PM    LUY9QBA 25 10/21/2022 11:50 AM    SOG9SWG 23.7 10/21/2022 03:21 PM    BEART -1.5 10/21/2022 03:21 PM     CBC:   Recent Labs     10/23/22  0338 10/24/22  0421   WBC 12.8* 11.0*   HGB 9.4* 9.1*   HCT 29.4* 27.7*   MCV 89.4 86.0    194     BMP:   Recent Labs     10/23/22  0338   *   K 4.5      CO2 20*   BUN 17   CREATININE 0.9     PT/INR:   Recent Labs     10/23/22  0338   PROTIME 17.0*   INR 1.37*     APTT: No results for input(s): APTT in the last 72 hours. Chest X-Ray:  Left lung well expanded, mild WILLAM atelectasis   CT:  I/O last 3 completed shifts: In:  [P.O.:]  Out:  [Urine:2150]      I/O last 3 completed shifts:   In:  [P.O.:]  Out:  [Urine:]  Scheduled Meds:    bumetanide  0.5 mg IntraVENous BID    insulin lispro  0-16 Units SubCUTAneous TID     insulin lispro  0-4 Units SubCUTAneous Nightly    metoprolol tartrate  25 mg Oral BID    primidone  150 mg Oral Nightly    sodium chloride flush  5-40 mL IntraVENous 2 times per day    aspirin  81 mg Oral Daily    clopidogrel  75 mg Oral Daily    atorvastatin  20 mg Oral Nightly    ipratropium-albuterol 1 ampule Inhalation Q4H WA    insulin glargine  0.15 Units/kg SubCUTAneous Nightly    insulin lispro  0-3 Units SubCUTAneous Nightly    [MAR Hold] primidone  200 mg Oral Nightly    [MAR Hold] insulin glargine  53 Units SubCUTAneous QAM    [MAR Hold] gemfibrozil  600 mg Oral BID    [MAR Hold] levothyroxine  137 mcg Oral QAM AC    [MAR Hold] insulin lispro  0-8 Units SubCUTAneous TID WC    [MAR Hold] insulin lispro  0-4 Units SubCUTAneous Nightly    [MAR Hold] aspirin  81 mg Oral Daily    [MAR Hold] sodium chloride flush  10 mL IntraVENous 2 times per day     Continuous Infusions:    sodium chloride      dextrose       Exam:   General appearance: NAD  Neck: no bruits, no JVD, No lymph nodes   Lungs: no rhonchi, no wheezes, no rales   Sternum: stable, dressing dry and intact    Heart: S1 and S2 no murmer, + rub  Abdomen: positive bowel sounds, no bruits, no masses  Extremities: warm and dry, no cyanosis, no clubbing  Leg Wounds:  Dressing dry and intact    Assessment  SP CABG for ischemic cardiomyopathy, LVEF 30%. Hemodynamically stable on low dose b-blocker. Pulmonary status is excellent. Oxygenating well on room air. CXR shows WILLAM atelectasis but improving. Will continue lung exercises, ambulation, etc.  Given low LVEF, she will require maintenance diuresis. It is difficult to determine her ideal diuretic dose without either a voss or the enforcement of strict I/O's in the PCU. Will try bumex 2 mg PO qd and monitor daily weight (currently 13 lbs above baseline). Anticipate home discharge once an appropriate diuretic regimen to avoid readmission for CHF is confirmed.       Patient Active Problem List   Diagnosis    Hypercholesteremia    Atypical chest pain    Heart disease    Acute coronary syndrome (HCC)    CAD in native artery         MD Jennifer Zapien MD

## 2022-10-25 NOTE — PROGRESS NOTES
Physical Therapy  Name: Marii Shi  MRN:  [id number]  Date of service:  10/25/2022     10/25/22 0855   Restrictions/Precautions   Restrictions/Precautions Surgical Protocols   Required Braces or Orthoses? No   Position Activity Restriction   Sternal Precautions No Pushing; No Pulling;5# Lifting Restrictions   Subjective   Subjective Pt agreed to therapy. Pain Assessment   Pain Assessment None - Denies Pain   Transfers   Sit to Stand Contact guard assistance   Stand to Sit Contact guard assistance   Ambulation   Surface Level tile   Device No Device   Assistance Minimal assistance   Quality of Gait fwd flexed, several standing rest breaks   Gait Deviations Slow Kathi;Decreased step length;Decreased step height   Distance 125'   Comments cues for posture and deep breathing   Patient Goals    Patient Goals  go home   Short Term Goals   Time Frame for Short Term Goals 2 wks   Short Term Goal 1 supine to sit indep   Short Term Goal 2 sit to stand indep   Short Term Goal 3 amb. 300'+ indep   Conditions Requiring Skilled Therapeutic Intervention   Body Structures, Functions, Activity Limitations Requiring Skilled Therapeutic Intervention Decreased functional mobility ; Decreased strength;Decreased balance; Increased pain;Decreased endurance   Assessment Pt able to amb in hallway without AD, mild unsteadiness at times but no LOB. Slow pace and several rest breaks. Able to improve posture with cueing but would fatigue quickly. Pt returned to chair with all needs in reach. Activity Tolerance   Activity Tolerance Patient limited by fatigue;Patient limited by endurance   PT Plan of Care   Tuesday X   Safety Devices   Type of Devices Call light within reach; Left in chair  (family member present)         Electronically signed by Khadar Bernal PTA on 10/25/2022 at 9:04 AM

## 2022-10-26 ENCOUNTER — APPOINTMENT (OUTPATIENT)
Dept: GENERAL RADIOLOGY | Age: 62
DRG: 234 | End: 2022-10-26

## 2022-10-26 VITALS
HEIGHT: 65 IN | OXYGEN SATURATION: 100 % | WEIGHT: 197.8 LBS | BODY MASS INDEX: 32.96 KG/M2 | RESPIRATION RATE: 16 BRPM | SYSTOLIC BLOOD PRESSURE: 109 MMHG | HEART RATE: 89 BPM | DIASTOLIC BLOOD PRESSURE: 60 MMHG | TEMPERATURE: 97.2 F

## 2022-10-26 LAB
GLUCOSE BLD-MCNC: 192 MG/DL (ref 70–99)
GLUCOSE BLD-MCNC: 234 MG/DL (ref 70–99)
PERFORMED ON: ABNORMAL
PERFORMED ON: ABNORMAL

## 2022-10-26 PROCEDURE — 97116 GAIT TRAINING THERAPY: CPT

## 2022-10-26 PROCEDURE — 94640 AIRWAY INHALATION TREATMENT: CPT

## 2022-10-26 PROCEDURE — 6370000000 HC RX 637 (ALT 250 FOR IP): Performed by: SURGERY

## 2022-10-26 PROCEDURE — 2580000003 HC RX 258: Performed by: SURGERY

## 2022-10-26 PROCEDURE — 71045 X-RAY EXAM CHEST 1 VIEW: CPT

## 2022-10-26 PROCEDURE — 82947 ASSAY GLUCOSE BLOOD QUANT: CPT

## 2022-10-26 PROCEDURE — 99024 POSTOP FOLLOW-UP VISIT: CPT | Performed by: SURGERY

## 2022-10-26 RX ORDER — ASPIRIN 81 MG/1
81 TABLET, CHEWABLE ORAL DAILY
Qty: 30 TABLET | Refills: 3 | Status: SHIPPED | OUTPATIENT
Start: 2022-10-26

## 2022-10-26 RX ORDER — BUMETANIDE 2 MG/1
1 TABLET ORAL DAILY
Qty: 30 TABLET | Refills: 3 | Status: SHIPPED | OUTPATIENT
Start: 2022-10-26

## 2022-10-26 RX ORDER — ATORVASTATIN CALCIUM 20 MG/1
20 TABLET, FILM COATED ORAL NIGHTLY
Qty: 30 TABLET | Refills: 3 | Status: SHIPPED | OUTPATIENT
Start: 2022-10-26

## 2022-10-26 RX ORDER — OXYCODONE HYDROCHLORIDE 10 MG/1
10 TABLET ORAL EVERY 4 HOURS PRN
Qty: 30 TABLET | Refills: 0 | Status: SHIPPED | OUTPATIENT
Start: 2022-10-26 | End: 2022-10-29

## 2022-10-26 RX ORDER — CLOPIDOGREL BISULFATE 75 MG/1
75 TABLET ORAL DAILY
Qty: 30 TABLET | Refills: 3 | Status: SHIPPED | OUTPATIENT
Start: 2022-10-27

## 2022-10-26 RX ADMIN — CLOPIDOGREL BISULFATE 75 MG: 75 TABLET ORAL at 09:07

## 2022-10-26 RX ADMIN — OXYCODONE 10 MG: 5 TABLET ORAL at 05:04

## 2022-10-26 RX ADMIN — METOPROLOL TARTRATE 25 MG: 25 TABLET, FILM COATED ORAL at 09:07

## 2022-10-26 RX ADMIN — SODIUM CHLORIDE, PRESERVATIVE FREE 10 ML: 5 INJECTION INTRAVENOUS at 09:09

## 2022-10-26 RX ADMIN — INSULIN LISPRO 4 UNITS: 100 INJECTION, SOLUTION INTRAVENOUS; SUBCUTANEOUS at 12:20

## 2022-10-26 RX ADMIN — IPRATROPIUM BROMIDE AND ALBUTEROL SULFATE 1 AMPULE: 2.5; .5 SOLUTION RESPIRATORY (INHALATION) at 14:40

## 2022-10-26 RX ADMIN — PRIMIDONE 150 MG: 50 TABLET ORAL at 09:08

## 2022-10-26 RX ADMIN — IPRATROPIUM BROMIDE AND ALBUTEROL SULFATE 1 AMPULE: 2.5; .5 SOLUTION RESPIRATORY (INHALATION) at 06:26

## 2022-10-26 RX ADMIN — ASPIRIN 81 MG: 81 TABLET, COATED ORAL at 09:07

## 2022-10-26 RX ADMIN — IPRATROPIUM BROMIDE AND ALBUTEROL SULFATE 1 AMPULE: 2.5; .5 SOLUTION RESPIRATORY (INHALATION) at 10:24

## 2022-10-26 RX ADMIN — BUMETANIDE 2 MG: 1 TABLET ORAL at 09:07

## 2022-10-26 ASSESSMENT — PAIN DESCRIPTION - LOCATION: LOCATION: CHEST;STERNUM

## 2022-10-26 ASSESSMENT — PAIN DESCRIPTION - ORIENTATION: ORIENTATION: MID

## 2022-10-26 ASSESSMENT — PAIN DESCRIPTION - DESCRIPTORS: DESCRIPTORS: ACHING

## 2022-10-26 ASSESSMENT — PAIN SCALES - GENERAL: PAINLEVEL_OUTOF10: 7

## 2022-10-26 NOTE — DISCHARGE SUMMARY
Physician Discharge Summary     Patient ID:  Jo Ann Dominguez  [id number]  75 y.o.  1960    Admit date: 10/20/2022    Discharge date and time: 10/26/22    Admitting Physician: Constantin Blood MD     Discharge Physician: same    Admission Diagnoses: Acute coronary syndrome [facility]) [I24.9]  Atypical chest pain [R07.89]  CAD in native artery [I25.10]    Discharge Diagnoses: same    Admission Condition: fair    Discharged Condition: good    Indication for Admission: Unstable angina, ischemic cardiomyopathy    Hospital Course: Admitted with unstable angina. Underwent heart cath and this revealed triple vessel CAD. She underwent urgent CABG and did well afterwards. Transferred out of the ICU on POD3. D/c home on POD5.     Consults: cardiology    Significant Diagnostic Studies: angiography: as above    Treatments: surgery: as above    Discharge Exam:  /65   Pulse 96   Temp 97.9 °F (36.6 °C) (Temporal)   Resp 16   Ht 5' 5\" (1.651 m)   Wt 197 lb 12.8 oz (89.7 kg)   SpO2 98%   BMI 32.92 kg/m²     General Appearance:    Alert, cooperative, no distress, appears stated age   Head:    Normocephalic, without obvious abnormality, atraumatic   Eyes:    PERRL, conjunctiva/corneas clear, EOM's intact, fundi     benign, both eyes   Ears:    Normal TM's and external ear canals, both ears   Nose:   Nares normal, septum midline, mucosa normal, no drainage    or sinus tenderness   Throat:   Lips, mucosa, and tongue normal; teeth and gums normal   Neck:   Supple, symmetrical, trachea midline, no adenopathy;     thyroid:  no enlargement/tenderness/nodules; no carotid    bruit or JVD   Back:     Symmetric, no curvature, ROM normal, no CVA tenderness   Lungs:     Clear to auscultation bilaterally, respirations unlabored   Chest Wall:    No tenderness or deformity    Heart:    Regular rate and rhythm, S1 and S2 normal, no murmur, rub   or gallop   Breast Exam:    No tenderness, masses, or nipple abnormality   Abdomen:     Soft, non-tender, bowel sounds active all four quadrants,     no masses, no organomegaly   Genitalia:    Normal female without lesion, discharge or tenderness   Rectal:    Normal tone ;guaiac negative stool   Extremities:   Extremities normal, atraumatic, no cyanosis or edema   Pulses:   2+ and symmetric all extremities   Skin:   Skin color, texture, turgor normal, no rashes or lesions   Lymph nodes:   Cervical, supraclavicular, and axillary nodes normal   Neurologic:   CNII-XII intact, normal strength, sensation and reflexes     throughout       Disposition: home    In process/preliminary results:  Outstanding Order Results       Date and Time Order Name Status Description    10/21/2022  3:21 PM APTT In process             Patient Instructions:   Current Discharge Medication List        START taking these medications    Details   oxyCODONE (OXY-IR) 10 MG immediate release tablet Take 1 tablet by mouth every 4 hours as needed for Pain for up to 3 days. Qty: 30 tablet, Refills: 0    Comments: Reduce doses taken as pain becomes manageable  Associated Diagnoses: CAD in native artery      aspirin 81 MG chewable tablet Take 1 tablet by mouth daily  Qty: 30 tablet, Refills: 3      atorvastatin (LIPITOR) 20 MG tablet Take 1 tablet by mouth nightly  Qty: 30 tablet, Refills: 3      metoprolol tartrate (LOPRESSOR) 25 MG tablet Take 1 tablet by mouth 2 times daily  Qty: 60 tablet, Refills: 3      bumetanide (BUMEX) 2 MG tablet Take 0.5 tablets by mouth daily  Qty: 30 tablet, Refills: 3      clopidogrel (PLAVIX) 75 MG tablet Take 1 tablet by mouth daily  Qty: 30 tablet, Refills: 3           CONTINUE these medications which have NOT CHANGED    Details   levothyroxine (SYNTHROID) 137 MCG tablet Take 137 mcg by mouth Daily      insulin glargine (LANTUS) 100 UNIT/ML injection vial Inject 53 Units into the skin every morning      primidone (MYSOLINE) 50 MG tablet Take 3 tablets in the morning and 4 tablets at night.   Qty: 360 tablet, Refills: 5    Associated Diagnoses: Essential tremor; Tremor      HUMALOG KWIKPEN 100 UNIT/ML SOPN INJECT 20 UNITS SUBCUTANEOUSLY THREE TIMES DAILY WITH MEALS      gemfibrozil (LOPID) 600 MG tablet Take 1 tablet by mouth 2 times daily           Activity: as prescribed    Signed:  Araceli Dakin  10/26/2022  1:26 PM

## 2022-10-26 NOTE — PROGRESS NOTES
Progress Note    10/26/2022 7:16 AM          POD#   5    Subjective:  Ms. Marcel Hanks is requiring frequent percocet doses for incisional pain but only one dose of morphine in the last day. PULSE OXIMETRY RANGE: SpO2  Av.1 %  Min: 96 %  Max: 100 %  SUPPLEMENTAL O2: O2 Flow Rate (L/min): 1 L/min   Vital Signs: /64   Pulse 88   Temp 97.4 °F (36.3 °C) (Temporal)   Resp 18   Ht 5' 5\" (1.651 m)   Wt 197 lb 12.8 oz (89.7 kg)   SpO2 96%   BMI 32.92 kg/m²    Temperature Range:   Temp: 97.4 °F (36.3 °C)   Temp  Av.8 °F (36.6 °C)  Min: 96.9 °F (36.1 °C)  Max: 99.1 °F (37.3 °C)       Rhythm: normal sinus rhythm    Labs:   ABG:    Lab Results   Component Value Date/Time    PHART 7.370 10/21/2022 03:21 PM    PO2ART 149.0 10/21/2022 03:21 PM    SBL8QMT 41.0 10/21/2022 03:21 PM    C7KYYOSN 97.0 10/21/2022 03:21 PM    ZFJ7TRO 25 10/21/2022 11:50 AM    QJV4OSP 23.7 10/21/2022 03:21 PM    BEART -1.5 10/21/2022 03:21 PM     CBC:   Recent Labs     10/24/22  0421 10/25/22  0947   WBC 11.0* 6.2   HGB 9.1* 9.2*   HCT 27.7* 27.5*   MCV 86.0 85.1    261     BMP:   Recent Labs     10/25/22  0947   *   K 3.7   CL 98   CO2 23   BUN 17   CREATININE 0.6     PT/INR: No results for input(s): PROTIME, INR in the last 72 hours. APTT: No results for input(s): APTT in the last 72 hours. Chest X-Ray:  Left lung well expanded, no effusion   CT:  I/O last 3 completed shifts: In: 1640 [P.O.:1630; I.V.:10]  Out: 2400 [Urine:2400]    k  I/O last 3 completed shifts: In: 1640 [P.O.:1630;  I.V.:10]  Out: 2400 [Urine:2400]  Scheduled Meds:    primidone  150 mg Oral BID    bumetanide  2 mg Oral BID    insulin lispro  0-16 Units SubCUTAneous TID WC    insulin lispro  0-4 Units SubCUTAneous Nightly    metoprolol tartrate  25 mg Oral BID    sodium chloride flush  5-40 mL IntraVENous 2 times per day    aspirin  81 mg Oral Daily    clopidogrel  75 mg Oral Daily    atorvastatin  20 mg Oral Nightly    ipratropium-albuterol  1 ampule Inhalation Q4H WA    insulin glargine  0.15 Units/kg SubCUTAneous Nightly    insulin lispro  0-3 Units SubCUTAneous Nightly    [MAR Hold] primidone  200 mg Oral Nightly    [MAR Hold] insulin glargine  53 Units SubCUTAneous QAM    [MAR Hold] gemfibrozil  600 mg Oral BID    [MAR Hold] levothyroxine  137 mcg Oral QAM AC    [MAR Hold] insulin lispro  0-8 Units SubCUTAneous TID WC    [MAR Hold] insulin lispro  0-4 Units SubCUTAneous Nightly    [MAR Hold] aspirin  81 mg Oral Daily    [MAR Hold] sodium chloride flush  10 mL IntraVENous 2 times per day     Continuous Infusions:    sodium chloride      dextrose       Exam:   General appearance: NAD  Neck: no bruits, no JVD, No lymph nodes   Lungs: no rhonchi, no wheezes, no rales   Sternum: stable, dressing dry and intact    Heart: S1 and S2 no murmer, + rub  Abdomen: positive bowel sounds, no bruits, no masses  Extremities: warm and dry, no cyanosis, no clubbing, mild edema  Leg Wounds:  Dressing dry and intact    Assessment  SP CABG x 4 for ischemic cardiomyopathy. Tolerating b-blocker with normal sinus rhythm. Incisional pain improving but is still requiring frequent medication doses. Will continue to monitor. Bumex increased due to leg edema. Good response over the last day. Will continue. She is ambulating well. Continue to work with PT. Anticipate home discharge once pain is better controlled.       Patient Active Problem List   Diagnosis    Hypercholesteremia    Atypical chest pain    Heart disease    Acute coronary syndrome (HCC)    CAD in native artery         MD Kvng Baker MD

## 2022-10-26 NOTE — PROGRESS NOTES
Physical Therapy  Name: Steffi Hodge  MRN:  [id number]  Date of service:  10/26/2022       10/26/22 1209   Restrictions/Precautions   Restrictions/Precautions Surgical Protocols   Required Braces or Orthoses? No   Position Activity Restriction   Sternal Precautions No Pushing; No Pulling;5# Lifting Restrictions   Subjective   Subjective Pt agreed to therapy. Pain Assessment   Pain Assessment None - Denies Pain   Transfers   Stand to Sit Contact guard assistance   Ambulation   Surface Level tile   Device No Device   Assistance Contact guard assistance   Quality of Gait fwd flexed at times, but improvement in posture   Gait Deviations Slow Kathi;Decreased step length;Decreased step height   Distance 175'   Comments x2 standing rest breaks   Patient Goals    Patient Goals  go home   Short Term Goals   Time Frame for Short Term Goals 2 wks   Short Term Goal 1 supine to sit indep   Short Term Goal 2 sit to stand indep   Short Term Goal 3 amb. 300'+ indep   Conditions Requiring Skilled Therapeutic Intervention   Body Structures, Functions, Activity Limitations Requiring Skilled Therapeutic Intervention Decreased functional mobility ; Decreased strength;Decreased balance; Increased pain;Decreased endurance   Assessment Pt able to tolerate increased distance with amb, 2 standing rest breaks and SOA reported towards end. Improved stability overall and no LOB. Assisted back to chair with all needs in reach. Activity Tolerance   Activity Tolerance Patient tolerated treatment well   PT Plan of Care   Wednesday X   Safety Devices   Type of Devices Call light within reach; Left in chair  (family member present)       Electronically signed by Shira Covington PTA on 10/26/2022 at 12:12 PM

## 2022-10-26 NOTE — PROGRESS NOTES
Physical Therapy  Name: Yahir Ring  MRN:  [id number]  Date of service:  10/26/2022       10/26/22 1433   Restrictions/Precautions   Restrictions/Precautions Surgical Protocols   Required Braces or Orthoses? No   Position Activity Restriction   Sternal Precautions No Pushing; No Pulling;5# Lifting Restrictions   Subjective   Subjective Pt agreed to therapy. Pain Assessment   Pain Assessment None - Denies Pain   Transfers   Sit to Stand Contact guard assistance   Stand to Sit Contact guard assistance   Ambulation   Surface Level tile   Device No Device   Assistance Contact guard assistance   Quality of Gait fwd flexed at times, able to improve with cues   Gait Deviations Slow Kathi;Decreased step length;Decreased step height   Distance 175'   Comments x1 standing rest break   Patient Goals    Patient Goals  go home   Short Term Goals   Time Frame for Short Term Goals 2 wks   Short Term Goal 1 supine to sit indep   Short Term Goal 2 sit to stand indep   Short Term Goal 3 amb. 300'+ indep   Conditions Requiring Skilled Therapeutic Intervention   Body Structures, Functions, Activity Limitations Requiring Skilled Therapeutic Intervention Decreased functional mobility ; Decreased strength;Decreased balance; Increased pain;Decreased endurance   Assessment Pt tolerated amb in hallway with some SOA post. Only required one standing rest break this treatment. Able to maintain sternal precautions. Pt returned to chair with all needs in reach. Activity Tolerance   Activity Tolerance Patient tolerated treatment well   PT Plan of Care   Wednesday X   Safety Devices   Type of Devices Call light within reach; Left in chair  (family member present)       Electronically signed by Jabier De Jesus PTA on 10/26/2022 at 2:37 PM

## 2022-10-27 ENCOUNTER — CARE COORDINATION (OUTPATIENT)
Dept: CASE MANAGEMENT | Age: 62
End: 2022-10-27

## 2022-10-27 NOTE — CONSULTS
Post-op Cardiac Rehab education packet was sent to the patient's address on record. Handouts included were titled; \"Home Instructions Following Heart Surgery\", \"Cardiac Home Exercise Program - Phase I\", \"A Healthy Heart: Care Instructions\", \"Cardiac Diet/Low Cholesterol\" and \"Cardiac Rehabilitation - An Individualized Supervised Program For You\". Patient was instructed to contact [facility] or [facility] to enroll in Phase II Outpatient Cardiac Rehab.

## 2022-10-27 NOTE — CARE COORDINATION
[facility] Care Transitions Initial Follow Up Call    Call within 2 business days of discharge: Yes      Patient: Marlyse Apley Patient : 1960   MRN: [id number]  Reason for Admission:   Discharge Date: 10/26/22 RARS: Readmission Risk Score: 11.8      Last Discharge  Street       Date Complaint Diagnosis Description Type Department Provider    10/20/22 Shortness of Breath Acute coronary syndrome ([address] . .. ED to Hosp-Admission (Discharged) (ADMITTED) [facility] LUDWIN Robin MD; Armando Helms, ... Spoke to: N/A      Care Transitions 24 Hour Call    Care Transitions Interventions         Follow Up  : Attempted to make contact with Tono Tarcy for an initial follow up call post discharge from the hospital without success. Unable to leave a message regarding intent of call and call back information. Will try again my next business day.      Future Appointments   Date Time Provider Radha Laurent   2022  2:00 PM MD LEELA Stevens [facility]   2022  1:45 PM ROB Baer [facility]   2022  3:30 PM MD LEELA Chamberlain [facility] Cardio [facility]       Dorothy Arteaga RN

## 2022-10-27 NOTE — PROGRESS NOTES
Physician Progress Note      Jose Packer  CSN #:                  [id number]  :                       1960  ADMIT DATE:       10/20/2022 2:52 AM  DISCH DATE:        10/26/2022 4:02 PM  RESPONDING  PROVIDER #:        Melody RIVAS          QUERY TEXT:    Pt admitted with shortness of breath. Noted documentation of NSTEMI in   cardiology consultant note on 10/20. If possible, please document in progress   notes and discharge summary:    The medical record reflects the following:  Risk Factors: CAD, HTN, strong family history of MI. Clinical Indicators: Troponin 0.86., 0.35, 0.04 , nausea and vomiting,   shortness of breath,  Treatment: Heart cath, echo , CABG X4. cardiology consult, cardiothoracic   consult,    Thank you    Mala Amin RN, BSN, [facility]  [phone]  Options provided:  -- NSTEMI  confirmed present on admission  -- NSTEMI  ruled out  -- Other - I will add my own diagnosis  -- Disagree - Not applicable / Not valid  -- Disagree - Clinically unable to determine / Unknown  -- Refer to Clinical Documentation Reviewer    PROVIDER RESPONSE TEXT:    The diagnosis of NSTEMI was confirmed as present on admission.     Query created by: Cortez Zambrano on 10/25/2022 11:16 AM      Electronically signed by:  Scout RIVAS 10/27/2022 9:20 AM

## 2022-10-28 ENCOUNTER — CARE COORDINATION (OUTPATIENT)
Dept: CASE MANAGEMENT | Age: 62
End: 2022-10-28

## 2022-10-28 NOTE — CARE COORDINATION
[facility] Care Transitions Initial Follow Up Call    Call within 2 business days of discharge: Yes        Patient: Remigio Barnes Patient : 1960   MRN: [id number]  Reason for Admission:   Discharge Date: 10/26/22 RARS: Readmission Risk Score: 11.8      Last Discharge  Street       Date Complaint Diagnosis Description Type Department Provider    10/20/22 Shortness of Breath Acute coronary syndrome ([address] . .. ED to Hosp-Admission (Discharged) (ADMITTED) [facility] LUDWIN Dozier MD; Gus Carter, ... Spoke with: N/A      Care Transitions 24 Hour Call    Care Transitions Interventions         Follow Up : Attempt #2 to make contact with Spike Lockwood for an initial follow up call post discharge from the hospital without success. Unable to leave a message regarding intent of call and call back information. Will discharge from CTN services at this time due to inability to make contact.     Future Appointments   Date Time Provider Radha Laurent   2022  2:00 PM MD LEELA Baker [facility] [place]   2022  1:45 PM ROB Chun [facility] [place]   2022  3:30 PM Elza Baer MD N [facility] Cardio [place]         Angel Ghotra RN

## 2022-11-02 ENCOUNTER — OFFICE VISIT (OUTPATIENT)
Dept: CARDIOTHORACIC SURGERY | Age: 62
End: 2022-11-02

## 2022-11-02 VITALS
DIASTOLIC BLOOD PRESSURE: 63 MMHG | HEIGHT: 65 IN | HEART RATE: 79 BPM | SYSTOLIC BLOOD PRESSURE: 98 MMHG | WEIGHT: 184 LBS | BODY MASS INDEX: 30.66 KG/M2

## 2022-11-02 DIAGNOSIS — Z95.1 S/P CABG (CORONARY ARTERY BYPASS GRAFT): Primary | ICD-10-CM

## 2022-11-02 PROCEDURE — 99024 POSTOP FOLLOW-UP VISIT: CPT | Performed by: SURGERY

## 2022-11-02 ASSESSMENT — ENCOUNTER SYMPTOMS
NAUSEA: 0
SINUS PAIN: 0
PHOTOPHOBIA: 0
COLOR CHANGE: 0
WHEEZING: 0
SHORTNESS OF BREATH: 0
APNEA: 0
DIARRHEA: 0
BLOOD IN STOOL: 0
CONSTIPATION: 0
TROUBLE SWALLOWING: 0
CHEST TIGHTNESS: 0

## 2022-11-02 NOTE — PROGRESS NOTES
Subjective:      Patient ID: Jo Ann Dominguez is a 63 y.o. female. HPI  Underwent urgent CABG due to ischemic cardiomyopathy with EH grade 1 flow in the RCA. She did well and was discharged home on POD5. She has done well at home. She still has some sternal pain but much improved. Making nice progress. Review of Systems   Constitutional:  Negative for activity change, appetite change, chills, diaphoresis, fatigue, fever and unexpected weight change. HENT:  Negative for congestion, hearing loss, sinus pain and trouble swallowing. Eyes:  Negative for photophobia and visual disturbance. Respiratory:  Negative for apnea, chest tightness, shortness of breath and wheezing. Cardiovascular:  Negative for chest pain, palpitations and leg swelling. Gastrointestinal:  Negative for blood in stool, constipation, diarrhea and nausea. Endocrine: Negative for cold intolerance, polyphagia and polyuria. Genitourinary:  Negative for difficulty urinating, flank pain, menstrual problem and vaginal bleeding. Musculoskeletal:  Negative for gait problem, neck pain and neck stiffness. Skin:  Negative for color change. Neurological:  Negative for dizziness, tremors, seizures and speech difficulty. Hematological:  Negative for adenopathy. Psychiatric/Behavioral:  Negative for dysphoric mood. Objective:   Physical Exam  HENT:      Head: Normocephalic. Right Ear: Tympanic membrane normal.      Nose: Nose normal.   Eyes:      Pupils: Pupils are equal, round, and reactive to light. Cardiovascular:      Rate and Rhythm: Regular rhythm. Heart sounds: Normal heart sounds. Pulmonary:      Breath sounds: Normal breath sounds. Abdominal:      General: Abdomen is flat. Palpations: Abdomen is soft. Musculoskeletal:         General: Normal range of motion. Skin:     General: Skin is warm. Capillary Refill: Capillary refill takes less than 2 seconds.    Neurological:      General: No focal deficit present. Mental Status: She is alert and oriented to person, place, and time. Psychiatric:         Behavior: Behavior normal.       Assessment:      Doing well s/p CABG. Continue current medications until f/u with Dr. Ester Tay. Plan:      One more f/u required with CT surgery.         Justin Bolaños MD

## 2022-11-29 ENCOUNTER — HOSPITAL ENCOUNTER (EMERGENCY)
Age: 62
Discharge: HOME | End: 2022-11-30

## 2022-11-29 ENCOUNTER — APPOINTMENT (OUTPATIENT)
Dept: GENERAL RADIOLOGY | Age: 62
End: 2022-11-29

## 2022-11-29 DIAGNOSIS — R07.89 ATYPICAL CHEST PAIN: Primary | ICD-10-CM

## 2022-11-29 LAB
ALBUMIN SERPL-MCNC: 4.2 G/DL (ref 3.5–5.2)
ALP BLD-CCNC: 88 U/L (ref 35–104)
ALT SERPL-CCNC: 15 U/L (ref 5–33)
ANION GAP SERPL CALCULATED.3IONS-SCNC: 12 MMOL/L (ref 7–19)
APTT: 38.7 SEC (ref 26–36.2)
AST SERPL-CCNC: 20 U/L (ref 5–32)
BASOPHILS ABSOLUTE: 0 K/UL (ref 0–0.2)
BASOPHILS RELATIVE PERCENT: 0.5 % (ref 0–1)
BILIRUB SERPL-MCNC: <0.2 MG/DL (ref 0.2–1.2)
BUN BLDV-MCNC: 12 MG/DL (ref 8–23)
CALCIUM SERPL-MCNC: 9.6 MG/DL (ref 8.8–10.2)
CHLORIDE BLD-SCNC: 103 MMOL/L (ref 98–111)
CO2: 24 MMOL/L (ref 22–29)
CREAT SERPL-MCNC: 0.7 MG/DL (ref 0.5–0.9)
EOSINOPHILS ABSOLUTE: 0.2 K/UL (ref 0–0.6)
EOSINOPHILS RELATIVE PERCENT: 5.2 % (ref 0–5)
GFR SERPL CREATININE-BSD FRML MDRD: >60 ML/MIN/{1.73_M2}
GLUCOSE BLD-MCNC: 174 MG/DL (ref 74–109)
HCT VFR BLD CALC: 36.5 % (ref 37–47)
HEMOGLOBIN: 11.6 G/DL (ref 12–16)
IMMATURE GRANULOCYTES #: 0 K/UL
INR BLD: 1.01 (ref 0.88–1.18)
LYMPHOCYTES ABSOLUTE: 2.1 K/UL (ref 1.1–4.5)
LYMPHOCYTES RELATIVE PERCENT: 51.4 % (ref 20–40)
MCH RBC QN AUTO: 27.8 PG (ref 27–31)
MCHC RBC AUTO-ENTMCNC: 31.8 G/DL (ref 33–37)
MCV RBC AUTO: 87.3 FL (ref 81–99)
MONOCYTES ABSOLUTE: 0.4 K/UL (ref 0–0.9)
MONOCYTES RELATIVE PERCENT: 8.7 % (ref 0–10)
NEUTROPHILS ABSOLUTE: 1.4 K/UL (ref 1.5–7.5)
NEUTROPHILS RELATIVE PERCENT: 34.2 % (ref 50–65)
PDW BLD-RTO: 14 % (ref 11.5–14.5)
PLATELET # BLD: 334 K/UL (ref 130–400)
PMV BLD AUTO: 9.8 FL (ref 9.4–12.3)
POTASSIUM REFLEX MAGNESIUM: 3.6 MMOL/L (ref 3.5–5)
PRO-BNP: 670 PG/ML (ref 0–900)
PROTHROMBIN TIME: 13.2 SEC (ref 12–14.6)
RBC # BLD: 4.18 M/UL (ref 4.2–5.4)
SODIUM BLD-SCNC: 139 MMOL/L (ref 136–145)
TOTAL PROTEIN: 7.7 G/DL (ref 6.6–8.7)
TROPONIN: <0.01 NG/ML (ref 0–0.03)
WBC # BLD: 4 K/UL (ref 4.8–10.8)

## 2022-11-29 PROCEDURE — 85610 PROTHROMBIN TIME: CPT

## 2022-11-29 PROCEDURE — 6370000000 HC RX 637 (ALT 250 FOR IP): Performed by: PHYSICIAN ASSISTANT

## 2022-11-29 PROCEDURE — 99285 EMERGENCY DEPT VISIT HI MDM: CPT

## 2022-11-29 PROCEDURE — 80053 COMPREHEN METABOLIC PANEL: CPT

## 2022-11-29 PROCEDURE — 85025 COMPLETE CBC W/AUTO DIFF WBC: CPT

## 2022-11-29 PROCEDURE — 93005 ELECTROCARDIOGRAM TRACING: CPT | Performed by: PHYSICIAN ASSISTANT

## 2022-11-29 PROCEDURE — 85730 THROMBOPLASTIN TIME PARTIAL: CPT

## 2022-11-29 PROCEDURE — 71045 X-RAY EXAM CHEST 1 VIEW: CPT

## 2022-11-29 PROCEDURE — 83880 ASSAY OF NATRIURETIC PEPTIDE: CPT

## 2022-11-29 PROCEDURE — 36415 COLL VENOUS BLD VENIPUNCTURE: CPT

## 2022-11-29 PROCEDURE — 84484 ASSAY OF TROPONIN QUANT: CPT

## 2022-11-29 RX ORDER — NITROGLYCERIN 0.4 MG/1
0.4 TABLET SUBLINGUAL EVERY 5 MIN PRN
Status: DISCONTINUED | OUTPATIENT
Start: 2022-11-29 | End: 2022-11-30 | Stop reason: HOSPADM

## 2022-11-29 RX ADMIN — ASPIRIN 325 MG: 325 TABLET, COATED ORAL at 22:47

## 2022-11-29 ASSESSMENT — ENCOUNTER SYMPTOMS
NAUSEA: 0
EYE DISCHARGE: 0
EYE PAIN: 0
RHINORRHEA: 0
COUGH: 0
PHOTOPHOBIA: 0
APNEA: 0
COLOR CHANGE: 0
SHORTNESS OF BREATH: 0
ABDOMINAL PAIN: 0
BACK PAIN: 0
ABDOMINAL DISTENTION: 0
SORE THROAT: 0

## 2022-11-29 ASSESSMENT — LIFESTYLE VARIABLES
HOW OFTEN DO YOU HAVE A DRINK CONTAINING ALCOHOL: NEVER
HOW MANY STANDARD DRINKS CONTAINING ALCOHOL DO YOU HAVE ON A TYPICAL DAY: PATIENT DOES NOT DRINK

## 2022-11-29 ASSESSMENT — PAIN - FUNCTIONAL ASSESSMENT: PAIN_FUNCTIONAL_ASSESSMENT: NONE - DENIES PAIN

## 2022-11-30 VITALS
OXYGEN SATURATION: 99 % | RESPIRATION RATE: 21 BRPM | HEIGHT: 65 IN | BODY MASS INDEX: 29.99 KG/M2 | DIASTOLIC BLOOD PRESSURE: 72 MMHG | TEMPERATURE: 99 F | WEIGHT: 180 LBS | SYSTOLIC BLOOD PRESSURE: 115 MMHG | HEART RATE: 75 BPM

## 2022-11-30 LAB
ADENOVIRUS BY PCR: NOT DETECTED
BORDETELLA PARAPERTUSSIS BY PCR: NOT DETECTED
BORDETELLA PERTUSSIS BY PCR: NOT DETECTED
CHLAMYDOPHILIA PNEUMONIAE BY PCR: NOT DETECTED
CORONAVIRUS 229E BY PCR: NOT DETECTED
CORONAVIRUS HKU1 BY PCR: NOT DETECTED
CORONAVIRUS NL63 BY PCR: NOT DETECTED
CORONAVIRUS OC43 BY PCR: NOT DETECTED
EKG P AXIS: 61 DEGREES
EKG P-R INTERVAL: 172 MS
EKG Q-T INTERVAL: 354 MS
EKG QRS DURATION: 70 MS
EKG QTC CALCULATION (BAZETT): 406 MS
EKG T AXIS: 144 DEGREES
HUMAN METAPNEUMOVIRUS BY PCR: NOT DETECTED
HUMAN RHINOVIRUS/ENTEROVIRUS BY PCR: NOT DETECTED
INFLUENZA A BY PCR: NOT DETECTED
INFLUENZA B BY PCR: NOT DETECTED
MYCOPLASMA PNEUMONIAE BY PCR: NOT DETECTED
PARAINFLUENZA VIRUS 1 BY PCR: NOT DETECTED
PARAINFLUENZA VIRUS 2 BY PCR: NOT DETECTED
PARAINFLUENZA VIRUS 3 BY PCR: NOT DETECTED
PARAINFLUENZA VIRUS 4 BY PCR: NOT DETECTED
RESPIRATORY SYNCYTIAL VIRUS BY PCR: NOT DETECTED
SARS-COV-2, PCR: NOT DETECTED

## 2022-11-30 PROCEDURE — 6370000000 HC RX 637 (ALT 250 FOR IP): Performed by: PHYSICIAN ASSISTANT

## 2022-11-30 PROCEDURE — 0202U NFCT DS 22 TRGT SARS-COV-2: CPT

## 2022-11-30 RX ADMIN — NITROGLYCERIN 0.4 MG: 0.4 TABLET, ORALLY DISINTEGRATING SUBLINGUAL at 00:16

## 2022-11-30 NOTE — ED PROVIDER NOTES
[facility]  eMERGENCYdEPARTMENT eNCOUnter      Pt Name: Lennox Sleeper  MRN: [id number]  [place] 1960  Date of evaluation: 11/29/2022  Provider:VASILIY Harrell    CHIEF COMPLAINT       Chief Complaint   Patient presents with    Chest Pain     Had quadruple bypass 4 weeks ago         HISTORY OF PRESENT ILLNESS  (Location/Symptom, Timing/Onset, Context/Setting, Quality, Duration, Modifying Factors, Severity.)   Lennox Sleeper is a 63 y.o. female who presents to the emergency department with complaints of quadruple bypass 4 weeks ago; Dr Kelsea Ham CABG x4 11/2/22 has chest pain at this time. Takes plavix and aspirin. Has been dealing with a pressure but feels acutely worse today 3/10 pain scale for me. Denies referral. Denies pleuritic aspect. No fever chills. No new meds for intervention. Taking what she is supposed to; compliant. HPI    Nursing Notes were reviewed and I agree. REVIEW OF SYSTEMS    (2-9 systems for level 4, 10 or more for level 5)     Review of Systems   Constitutional:  Negative for activity change, appetite change, chills and fever. HENT:  Negative for congestion, postnasal drip, rhinorrhea and sore throat. Eyes:  Negative for photophobia, pain, discharge and visual disturbance. Respiratory:  Negative for apnea, cough and shortness of breath. Cardiovascular:  Positive for chest pain. Negative for leg swelling. Gastrointestinal:  Negative for abdominal distention, abdominal pain and nausea. Genitourinary:  Negative for vaginal bleeding. Musculoskeletal:  Negative for arthralgias, back pain, joint swelling, neck pain and neck stiffness. Skin:  Negative for color change and rash. Neurological:  Negative for dizziness, syncope, facial asymmetry and headaches. Hematological:  Negative for adenopathy. Does not bruise/bleed easily. Psychiatric/Behavioral:  Negative for agitation, behavioral problems and confusion.        Except as noted above the remainder of the review of systems was reviewed and negative. PAST MEDICAL HISTORY     Past Medical History:   Diagnosis Date    Acquired cataract     Diabetes ([address] 2     Essential tremor     Hypercholesteremia          SURGICAL HISTORY       Past Surgical History:   Procedure Laterality Date    CORONARY ARTERY BYPASS GRAFT N/A 10/21/2022    OPEN STERNUM CABG CORONARY ARTERY BYPASS X 4 WITH LEFT INTERNAL MAMMARY ARTERY, ENDOSCOPIC VEIN HARVEST WITH PERFUSION. TRANSESOPHAGEAL ECHOCARDIOGRAM. performed by Hemal Allison MD at [facility]       Previous Medications    ASPIRIN 81 MG CHEWABLE TABLET    Take 1 tablet by mouth daily    ATORVASTATIN (LIPITOR) 20 MG TABLET    Take 1 tablet by mouth nightly    BUMETANIDE (BUMEX) 2 MG TABLET    Take 0.5 tablets by mouth daily    CLOPIDOGREL (PLAVIX) 75 MG TABLET    Take 1 tablet by mouth daily    GEMFIBROZIL (LOPID) 600 MG TABLET    Take 1 tablet by mouth 2 times daily    HUMALOG KWIKPEN 100 UNIT/ML SOPN    INJECT 20 UNITS SUBCUTANEOUSLY THREE TIMES DAILY WITH MEALS    INSULIN GLARGINE (LANTUS) 100 UNIT/ML INJECTION VIAL    Inject 53 Units into the skin every morning    LEVOTHYROXINE (SYNTHROID) 137 MCG TABLET    Take 137 mcg by mouth Daily    METOPROLOL TARTRATE (LOPRESSOR) 25 MG TABLET    Take 1 tablet by mouth 2 times daily    PRIMIDONE (MYSOLINE) 50 MG TABLET    Take 3 tablets in the morning and 4 tablets at night. ALLERGIES     Patient has no known allergies.     FAMILY HISTORY       Family History   Problem Relation Age of Onset    No Known Problems Mother         2nd hand smoker    Heart Disease Father         smoker    Heart Attack Father          at age 58 and had 4 MIs in the 4 months preceding the last and fatal one    Heart Disease Sister         2nd hand smoker    Heart Attack Sister 32    No Known Problems Brother         2nd hand smoker    No Known Problems Brother         2nd hand smoker    No Known Problems Brother         2nd hand smoker    No Known Problems Brother         2nd hand smoker    No Known Problems Daughter     No Known Problems Daughter     No Known Problems Daughter     No Known Problems Daughter     No Known Problems Daughter         adopted out    No Known Problems Son           SOCIAL HISTORY       Social History     Socioeconomic History    Marital status:      Spouse name: None    Number of children: 6    Years of education: None    Highest education level: None   Occupational History    Occupation:      Comment: retired   Tobacco Use    Smoking status: Never    Smokeless tobacco: Never   Vaping Use    Vaping Use: Never used   Substance and Sexual Activity    Alcohol use: Never    Drug use: Never   Social History Narrative    CODE STATUS: LIMITED CODE - NO INTUBATION, it was discussed that normally needs intubation after CPR nilson survive but pt declines that and is aware that she would be unlikely to have beenfit from CPR then    HEALTH CARE PROXY: her daughter, Mrs. Ramiro Brown: independently    DOMICILED: has no stairs in the home, has 2 steps to enter the home, lives alone, no pets       SCREENINGS    Ulcy Coma Scale  Eye Opening: Spontaneous  Best Verbal Response: Oriented  Best Motor Response: Obeys commands  Lucy Coma Scale Score: 15      PHYSICAL EXAM    (up to 7 forlevel 4, 8 or more for level 5)     ED Triage Vitals [11/29/22 1907]   BP Temp Temp src Heart Rate Resp SpO2 Height Weight   120/84 98.3 °F (36.8 °C) -- 97 18 98 % 5' 5\" (1.651 m) 180 lb (81.6 kg)       Physical Exam  Vitals and nursing note reviewed. Constitutional:       General: She is not in acute distress. Appearance: Normal appearance. She is well-developed. She is not diaphoretic. HENT:      Head: Normocephalic and atraumatic.       Right Ear: Tympanic membrane, ear canal and external ear normal.      Left Ear: Tympanic membrane, ear canal and external ear normal.      Nose: Nose normal. Mouth/Throat:      Mouth: Mucous membranes are moist.      Pharynx: No oropharyngeal exudate. Eyes:      General:         Right eye: No discharge. Left eye: No discharge. Pupils: Pupils are equal, round, and reactive to light. Neck:      Thyroid: No thyromegaly. Cardiovascular:      Rate and Rhythm: Normal rate and regular rhythm. Pulses: Normal pulses. Heart sounds: Normal heart sounds. No murmur heard. No friction rub. Pulmonary:      Effort: Pulmonary effort is normal. No respiratory distress. Breath sounds: Normal breath sounds. No stridor. No wheezing. Abdominal:      General: Abdomen is flat. Bowel sounds are normal. There is no distension. Palpations: Abdomen is soft. Tenderness: There is no abdominal tenderness. Musculoskeletal:         General: Normal range of motion. Cervical back: Normal range of motion and neck supple. Skin:     General: Skin is warm and dry. Capillary Refill: Capillary refill takes less than 2 seconds. Findings: No rash. Neurological:      General: No focal deficit present. Mental Status: She is alert and oriented to person, place, and time. Mental status is at baseline. Cranial Nerves: No cranial nerve deficit. Sensory: No sensory deficit. Coordination: Coordination normal.   Psychiatric:         Mood and Affect: Mood normal.         Behavior: Behavior normal.         Thought Content: Thought content normal.         Judgment: Judgment normal.         DIAGNOSTIC RESULTS     RADIOLOGY:   Non-plain film images such as CT, Ultrasound and MRI are read by the radiologist. Plain radiographic images are visualized and preliminarilyinterpreted by No att. providers found with the below findings:        Interpretation per the Radiologist below, if available at the time of this note:    XR CHEST PORTABLE   Final Result   Small amount of right basilar atelectasis.   Pneumonia and aspiration are less likelyElectronically signed by Saintclair Meter, M.D. on 11-29-22 at 2329          LABS:  Labs Reviewed   CBC WITH AUTO DIFFERENTIAL - Abnormal; Notable for the following components:       Result Value    WBC 4.0 (*)     RBC 4.18 (*)     Hemoglobin 11.6 (*)     Hematocrit 36.5 (*)     MCHC 31.8 (*)     Neutrophils % 34.2 (*)     Lymphocytes % 51.4 (*)     Eosinophils % 5.2 (*)     Neutrophils Absolute 1.4 (*)     All other components within normal limits   COMPREHENSIVE METABOLIC PANEL W/ REFLEX TO MG FOR LOW K - Abnormal; Notable for the following components:    Glucose 174 (*)     All other components within normal limits   APTT - Abnormal; Notable for the following components:    aPTT 38.7 (*)     All other components within normal limits   RESPIRATORY PANEL, MOLECULAR, WITH COVID-19   TROPONIN   BRAIN NATRIURETIC PEPTIDE   PROTIME-INR       All other labs were within normal range or notreturned as of this dictation. RE-ASSESSMENT          EMERGENCY DEPARTMENT COURSE and DIFFERENTIAL DIAGNOSIS/MDM:   Vitals:    Vitals:    11/29/22 2220 11/29/22 2231 11/30/22 0002 11/30/22 0032   BP: 138/80  122/84 113/71   Pulse: 86 86 84 84   Resp: 17  18 19   Temp: 99 °F (37.2 °C)      TempSrc: Oral      SpO2: 95%  96% 94%   Weight: 180 lb (81.6 kg)      Height: 5' 5\" (1.651 m)            MDM  I feel like some of this pain is positional and reproduced additionally aspirin nitro dont really help when given she has no EKG findings a little bit of fluid on lungs slight bnp change has been higher in past consider 72 hrs diuretics. This has been ongoing intermittently for at least 3 days I dont feel repeats indicated nor does my attending. Plan for dc following resp panel want to confirm nothing viral with WBC. Will let cards know to get their blessing for dc. PROCEDURES:    Procedures      FINAL IMPRESSION      1.  Atypical chest pain          DISPOSITION/PLAN   DISPOSITION Discharge - Pending Orders Complete 11/30/2022 01:17:47 AM      PATIENT REFERRED TO:  [facility] EMERGENCY DEPT  [address]  [phone]    If symptoms worsen    DISCHARGE MEDICATIONS:  New Prescriptions    No medications on file       (Please note that portions of this note were completed with a voice recognition program.  Efforts were made to edit the dictations but occasionallywords are mis-transcribed.)    Tyree Guerrero, [address] [place] [address]  11/30/22 3054

## 2022-11-30 NOTE — DISCHARGE INSTRUCTIONS
Take meds as prescribed  Anti-inflammatories for pain   Call your surgeon for follow up for persisting issues some of the aches and pressure to be expected as muscle and bone heal.  Consider 72 hr diuretic trial as well

## 2022-12-15 ENCOUNTER — OFFICE VISIT (OUTPATIENT)
Dept: CARDIOLOGY CLINIC | Age: 62
End: 2022-12-15

## 2022-12-15 VITALS
WEIGHT: 182 LBS | SYSTOLIC BLOOD PRESSURE: 122 MMHG | HEART RATE: 83 BPM | BODY MASS INDEX: 30.32 KG/M2 | HEIGHT: 65 IN | DIASTOLIC BLOOD PRESSURE: 74 MMHG

## 2022-12-15 DIAGNOSIS — R94.30 CARDIAC LV EJECTION FRACTION 30-35%: ICD-10-CM

## 2022-12-15 DIAGNOSIS — Z95.1 S/P CABG (CORONARY ARTERY BYPASS GRAFT): ICD-10-CM

## 2022-12-15 DIAGNOSIS — I24.9 ACUTE CORONARY SYNDROME (HCC): ICD-10-CM

## 2022-12-15 DIAGNOSIS — I51.9 LEFT VENTRICULAR DYSFUNCTION: ICD-10-CM

## 2022-12-15 DIAGNOSIS — R06.00 DYSPNEA, UNSPECIFIED TYPE: Primary | ICD-10-CM

## 2022-12-15 DIAGNOSIS — R94.31 ABNORMAL ECG: ICD-10-CM

## 2022-12-15 DIAGNOSIS — I25.10 CAD IN NATIVE ARTERY: ICD-10-CM

## 2022-12-15 DIAGNOSIS — R94.30 CARDIAC LV EJECTION FRACTION OF 35-39%: ICD-10-CM

## 2022-12-15 DIAGNOSIS — E78.00 HYPERCHOLESTEREMIA: ICD-10-CM

## 2022-12-15 PROCEDURE — 99214 OFFICE O/P EST MOD 30 MIN: CPT | Performed by: INTERNAL MEDICINE

## 2022-12-15 ASSESSMENT — ENCOUNTER SYMPTOMS
EYES NEGATIVE: 1
VOMITING: 0
RESPIRATORY NEGATIVE: 1
NAUSEA: 0
SHORTNESS OF BREATH: 0
DIARRHEA: 0
GASTROINTESTINAL NEGATIVE: 1

## 2022-12-15 NOTE — PROGRESS NOTES
[facility]                            Date:  12/15/2022  Patient: Melani Echeverria  Age:  63 y.o., 1960      Reason for evaluation:         SUBJECTIVE:    Returns today for assessment status post CABG in October specifically 10/21/2022 with LIMA graft LAD vein graft to OM1 and OM 2 and vein graft to the right coronary artery. Overall doing quite well. She is active exercising not in rehab has no complaints she did have to go to the hospital sometime in November for fluid overload placed on a diuretic. Ejection fraction preoperatively was 35%. We will need to schedule echocardiogram end of January to see if her left ventricular function is still poor. On metoprolol 25 twice daily but not on ACE inhibitor or Entresto we will go ahead and start her on Entresto. Denies overt dyspnea denies edema at this time denies chest discomfort no other complaints. She did mention that she has a very small spot on the upper end of her incision which is still open of may be about 1 to 2 mm in height not draining and she has a follow-up appointment with Dr. Whitney Mcnulty and we discussed this briefly and she will follow-up with him regarding that. Blood pressure 122/74 heart 83. Review of Systems   Constitutional: Negative. Negative for chills, fever and unexpected weight change. HENT: Negative. Eyes: Negative. Respiratory: Negative. Negative for shortness of breath. Cardiovascular: Negative. Negative for chest pain. Gastrointestinal: Negative. Negative for diarrhea, nausea and vomiting. Endocrine: Negative. Genitourinary: Negative. Musculoskeletal: Negative. Skin: Negative. Neurological: Negative. All other systems reviewed and are negative. OBJECTIVE:     /74   Pulse 83   Ht 5' 5\" (1.651 m)   Wt 182 lb (82.6 kg)   BMI 30.29 kg/m²     Labs:   CBC: No results for input(s): WBC, HGB, HCT, PLT in the last 72 hours.   BMP:No results for input(s): NA, K, CO2, BUN, CREATININE, LABGLOM, GLUCOSE in the last 72 hours. BNP: No results for input(s): BNP in the last 72 hours. PT/INR: No results for input(s): PROTIME, INR in the last 72 hours. APTT:No results for input(s): APTT in the last 72 hours. CARDIAC ENZYMES:No results for input(s): CKTOTAL, CKMB, CKMBINDEX, TROPONINI in the last 72 hours. FASTING LIPID PANEL:  Lab Results   Component Value Date/Time    HDL 44 10/20/2022 02:55 AM    LDLCALC 144 10/20/2022 02:55 AM    TRIG 168 10/20/2022 02:55 AM     LIVER PROFILE:No results for input(s): AST, ALT, LABALBU in the last 72 hours. Past Medical History:   Diagnosis Date    Acquired cataract     Diabetes ([address] 2     Essential tremor     Hypercholesteremia      Past Surgical History:   Procedure Laterality Date    CORONARY ARTERY BYPASS GRAFT N/A 10/21/2022    OPEN STERNUM CABG CORONARY ARTERY BYPASS X 4 WITH LEFT INTERNAL MAMMARY ARTERY, ENDOSCOPIC VEIN HARVEST WITH PERFUSION.   TRANSESOPHAGEAL ECHOCARDIOGRAM. performed by Katie Kurtz MD at [facility] OR     Family History   Problem Relation Age of Onset    No Known Problems Mother         2nd hand smoker    Heart Disease Father         smoker    Heart Attack Father          at age 58 and had 3 MIs in the 4 months preceding the last and fatal one    Heart Disease Sister         2nd hand smoker    Heart Attack Sister 32    No Known Problems Brother         2nd hand smoker    No Known Problems Brother         2nd hand smoker    No Known Problems Brother         2nd hand smoker    No Known Problems Brother         2nd hand smoker    No Known Problems Daughter     No Known Problems Daughter     No Known Problems Daughter     No Known Problems Daughter     No Known Problems Daughter         adopted out    No Known Problems Son      No Known Allergies  Current Outpatient Medications   Medication Sig Dispense Refill    aspirin 81 MG chewable tablet Take 1 tablet by mouth daily 30 tablet 3    atorvastatin (LIPITOR) 20 MG tablet Take 1 tablet by mouth nightly 30 tablet 3    metoprolol tartrate (LOPRESSOR) 25 MG tablet Take 1 tablet by mouth 2 times daily 60 tablet 3    clopidogrel (PLAVIX) 75 MG tablet Take 1 tablet by mouth daily 30 tablet 3    levothyroxine (SYNTHROID) 137 MCG tablet Take 137 mcg by mouth Daily      insulin glargine (LANTUS) 100 UNIT/ML injection vial Inject 53 Units into the skin every morning      primidone (MYSOLINE) 50 MG tablet Take 3 tablets in the morning and 4 tablets at night. 360 tablet 5    HUMALOG KWIKPEN 100 UNIT/ML SOPN INJECT 20 UNITS SUBCUTANEOUSLY THREE TIMES DAILY WITH MEALS      gemfibrozil (LOPID) 600 MG tablet Take 1 tablet by mouth 2 times daily      bumetanide (BUMEX) 2 MG tablet Take 0.5 tablets by mouth daily (Patient not taking: No sig reported) 30 tablet 3     No current facility-administered medications for this visit. Social History     Socioeconomic History    Marital status:       Spouse name: Not on file    Number of children: 6    Years of education: Not on file    Highest education level: Not on file   Occupational History    Occupation:      Comment: retired   Tobacco Use    Smoking status: Never    Smokeless tobacco: Never   Vaping Use    Vaping Use: Never used   Substance and Sexual Activity    Alcohol use: Never    Drug use: Never    Sexual activity: Not on file     Comment: had 6 kids, 5 survive   Other Topics Concern    Not on file   Social History Narrative    CODE STATUS: LIMITED CODE - NO INTUBATION, it was discussed that normally needs intubation after CPR nilson survive but pt declines that and is aware that she would be unlikely to have beenfit from CPR then    HEALTH CARE PROXY: her daughter, Mrs. Davila Oven: independently    DOMICILED: has no stairs in the home, has 2 steps to enter the home, lives alone, no pets     Social Determinants of Health     Financial Resource Strain: Not on file   Food Insecurity: Not on file Transportation Needs: Not on file   Physical Activity: Not on file   Stress: Not on file   Social Connections: Not on file   Intimate Partner Violence: Not on file   Housing Stability: Not on file       Physical Examination:  /74   Pulse 83   Ht 5' 5\" (1.651 m)   Wt 182 lb (82.6 kg)   BMI 30.29 kg/m²   Physical Exam  Vitals reviewed. Constitutional:       Appearance: She is well-developed. Neck:      Vascular: No carotid bruit or JVD. Cardiovascular:      Rate and Rhythm: Normal rate and regular rhythm. Heart sounds: Normal heart sounds. No murmur heard. No friction rub. No gallop. Pulmonary:      Effort: Pulmonary effort is normal. No respiratory distress. Breath sounds: Normal breath sounds. No wheezing or rales. Abdominal:      General: There is no distension. Tenderness: There is no abdominal tenderness. Lymphadenopathy:      Cervical: No cervical adenopathy. Skin:     General: Skin is warm and dry. ASSESSMENT:     Diagnosis Orders   1. Dyspnea, unspecified type        2. Hypercholesteremia        3. Abnormal ECG        4. Acute coronary syndrome (HCC)  Comprehensive Metabolic Panel    ECHO Complete 2D W Doppler W Color      5. CAD in native artery  ECHO Complete 2D W Doppler W Color      6. S/P CABG (coronary artery bypass graft)  ECHO Complete 2D W Doppler W Color      7. Left ventricular dysfunction  ECHO Complete 2D W Doppler W Color          PLAN:  Orders Placed This Encounter   Procedures    Comprehensive Metabolic Panel    ECHO Complete 2D W Doppler W Color       No orders of the defined types were placed in this encounter. Continue present medications  Recommend follow-up assessment in 3 months  Entresto 24/26 1 p.o. twice daily  Repeat CHEM 7 profile 1 week after starting Entresto  Echocardiogram at the end of January to see if she would require an ICD.     Return in about 3 months (around 3/15/2023) for return to Dr. Chilo Lees only.      Malissa Jose MD 12/15/2022 11:26 AM [facility]      Thisdictation was generated by voice recognition computer software. Although all attempts are made to edit the dictation for accuracy, there may be errors in the transcription that are not intended.

## 2022-12-15 NOTE — PATIENT INSTRUCTIONS
Have CMP drawn one week after starting Entresto. [facility] at the [address] [place] [address] and [address] located on the first floor of [place] [address] through hospital main entrance and turn immediately to your left. Date/Time:     Patient's contact number:  [phone] (home)     Echocardiogram -  No prep. Takes approximately 30 min. An echocardiogram uses sound waves to produce images of your heart. This commonly used test allows your doctor to see how your heart is beating and pumping blood. Your doctor can use the images from an echocardiogram to identify various abnormalities in the heart muscle and valves. This test has 2 parts: You will be asked to disrobe from the waist up and given a gown to wear. The technologist will then hook up an EKG monitor to you for the entire exam.   You will then have an ultrasound of your heart (echocardiogram) to assess the heart muscle, heart valves and heart function. You may eat and take any medicines before the exam.     If you need to change your appointment, please call outpatient scheduling at [phone].

## 2022-12-20 ENCOUNTER — OFFICE VISIT (OUTPATIENT)
Dept: NEUROLOGY | Age: 62
End: 2022-12-20

## 2022-12-20 VITALS
BODY MASS INDEX: 30.32 KG/M2 | WEIGHT: 182 LBS | HEIGHT: 65 IN | DIASTOLIC BLOOD PRESSURE: 70 MMHG | OXYGEN SATURATION: 100 % | SYSTOLIC BLOOD PRESSURE: 120 MMHG | HEART RATE: 80 BPM

## 2022-12-20 DIAGNOSIS — G25.0 ESSENTIAL TREMOR: Primary | ICD-10-CM

## 2022-12-20 PROCEDURE — 99213 OFFICE O/P EST LOW 20 MIN: CPT | Performed by: NURSE PRACTITIONER

## 2022-12-20 RX ORDER — FLASH GLUCOSE SENSOR
KIT MISCELLANEOUS
COMMUNITY
Start: 2022-09-16

## 2022-12-20 RX ORDER — FLASH GLUCOSE SCANNING READER
EACH MISCELLANEOUS
COMMUNITY
Start: 2022-09-23

## 2022-12-20 NOTE — PROGRESS NOTES
[facility] Neurology Office Note      Patient:   Shoaib Gilmore  MR#:    [id number]  Account Number:                         YOB: 1960  Date of Evaluation:  2022  Time of Note:                          2:18 PM  Primary/Referring Physician:  Alcus Eisenmenger, APRN - NP   Consulting Physician:  Genevieve Gil DNP, APRN    FOLLOW UP    Chief Complaint   Patient presents with    Follow-up    Tremors     Follow up for tremor last seen 2021. C/o worsening tremor in both hands and head      HISTORY OF PRESENT ILLNESS    Shoaib Gilmore is a 63y.o. year old female here for follow up of tremor. She was last seen in 2021. She underwent CABG x4 in 2022. She has noted worsening tremor. She notes bilateral hand tremor. Notes head titubation. Notes intermittent vocal tremor. Worsens with intention/posture, fine motor tasks. Tremor is interfering with ADLs. She is taking Primidone 100/200. Noted sedation with higher dosing. No resting tremor. No rigidity or bradykinesia. Denies gait changes. She has failed Propranolol previously. No tremor inducing medications. No stroke history. There is a family history of Parkinson's. No clear family history of essential tremor. Past Medical History:   Diagnosis Date    Acquired cataract     Diabetes ([facility] [address] 2     Essential tremor     Hypercholesteremia        Past Surgical History:   Procedure Laterality Date    CORONARY ARTERY BYPASS GRAFT N/A 10/21/2022    OPEN STERNUM CABG CORONARY ARTERY BYPASS X 4 WITH LEFT INTERNAL MAMMARY ARTERY, ENDOSCOPIC VEIN HARVEST WITH PERFUSION.   TRANSESOPHAGEAL ECHOCARDIOGRAM. performed by Jared Santos MD at [address] [facility] [place] OR       Family History   Problem Relation Age of Onset    No Known Problems Mother         2nd hand smoker    Heart Disease Father         smoker    Heart Attack Father          at age 58 and had 4 MIs in the 4 months preceding the last and fatal one    Heart Disease Sister         2nd hand smoker    Heart Attack Sister 32    No Known Problems Brother         2nd hand smoker    No Known Problems Brother         2nd hand smoker    No Known Problems Brother         2nd hand smoker    No Known Problems Brother         2nd hand smoker    No Known Problems Daughter     No Known Problems Daughter     No Known Problems Daughter     No Known Problems Daughter     No Known Problems Daughter         adopted out    No Known Problems Son        Social History     Socioeconomic History    Marital status:       Spouse name: Not on file    Number of children: 6    Years of education: Not on file    Highest education level: Not on file   Occupational History    Occupation:      Comment: retired   Tobacco Use    Smoking status: Never    Smokeless tobacco: Never   Vaping Use    Vaping Use: Never used   Substance and Sexual Activity    Alcohol use: Never    Drug use: Never    Sexual activity: Not on file     Comment: had 6 kids, 5 survive   Other Topics Concern    Not on file   Social History Narrative    CODE STATUS: LIMITED CODE - NO INTUBATION, it was discussed that normally needs intubation after CPR nilson survive but pt declines that and is aware that she would be unlikely to have beenfit from CPR then    HEALTH CARE PROXY: her daughter, Mrs. Alina Dyer: independently    DOMICILED: has no stairs in the home, has 2 steps to enter the home, lives alone, no pets     Social Determinants of Health     Financial Resource Strain: Not on file   Food Insecurity: Not on file   Transportation Needs: Not on file   Physical Activity: Not on file   Stress: Not on file   Social Connections: Not on file   Intimate Partner Violence: Not on file   Housing Stability: Not on file       Current Outpatient Medications   Medication Sig Dispense Refill    Continuous Blood Gluc  (FREESTYLE SEEMA 2 READER) BRANNON USE AS DIRECTED      Continuous Blood Gluc Sensor (FREESTYLE SEEMA 2 SENSOR) MISC USE AS DIRECTED aspirin 81 MG chewable tablet Take 1 tablet by mouth daily 30 tablet 3    atorvastatin (LIPITOR) 20 MG tablet Take 1 tablet by mouth nightly 30 tablet 3    metoprolol tartrate (LOPRESSOR) 25 MG tablet Take 1 tablet by mouth 2 times daily 60 tablet 3    clopidogrel (PLAVIX) 75 MG tablet Take 1 tablet by mouth daily 30 tablet 3    levothyroxine (SYNTHROID) 137 MCG tablet Take 137 mcg by mouth Daily      insulin glargine (LANTUS) 100 UNIT/ML injection vial Inject 53 Units into the skin every morning      primidone (MYSOLINE) 50 MG tablet Take 3 tablets in the morning and 4 tablets at night. 360 tablet 5    HUMALOG KWIKPEN 100 UNIT/ML SOPN INJECT 20 UNITS SUBCUTANEOUSLY THREE TIMES DAILY WITH MEALS      gemfibrozil (LOPID) 600 MG tablet Take 1 tablet by mouth 2 times daily      sacubitril-valsartan (ENTRESTO) 24-26 MG per tablet Take 1 tablet by mouth 2 times daily 60 tablet 3     No current facility-administered medications for this visit.        No Known Allergies    REVIEW OF SYSTEMS  Constitutional: []Fever []Sweats []Chills [] Recent Injury [x] Denies all unless marked  HEENT:[]Headache  [] Head Injury [] Hearing Loss  [] Sore Throat  [] Ear Ache [x] Denies all unless marked  Spine:  [] Neck pain  [] Back pain  [] Sciaticia  [x] Denies all unless marked  Cardiovascular:[]Heart Disease []Palpitations [] Chest Pain   [x] Denies all unless marked  Pulmonary: []Shortness of Breath []Cough   [x] Denies all unless marked  Psychiatric/Behavioral:[] Depression [] Anxiety [x] Denies all unless marked  Gastrointestinal: []Nausea  []Vomiting  []Abdominal Pain  []Constipation  []Diarrhea  [x] Denies all unless marked  Genitourinary:   [] Frequency  [] Urgency  [] Dysuria [] Incontinence  [x] Denies all unless marked  Extremities: []Pain  []Swelling  [x] Denies all unless marked  Musculoskeletal: [] Myalgias  [] Joint Pain  [] Arthritis [] Muscle Cramps [] Muscle Twitches  [x] Denies all unless marked  Sleep: []Insomnia[]Snoring []Restless Legs  []Sleep Apnea  []Daytime Sleepiness  [x] Denies all unless marked  Skin:[] Rash [] Color Change [x] Denies all unless marked   Neurological:[]Visual Disturbance [] Memory Loss []Loss of Balance []Slurred Speech []Weakness []Seizures  [] Dizziness [x] Denies all unless marked    The MA has completed the ROS with the patient. I have reviewed it in its' entirety with the patient and agree with the documentation. PHYSICAL EXAM  /70   Pulse 80   Ht 5' 5\" (1.651 m)   Wt 182 lb (82.6 kg)   SpO2 100%   BMI 30.29 kg/m²       Constitutional - No acute distress    HEENT- Conjunctiva normal.  No scars, masses, or lesions over external nose or ears, no neck masses noted, no jugular vein distension, no bruit  Cardiac- Regular rate and rhythm  Pulmonary- Good expansion, normal effort without use of accessory muscles  Musculoskeletal - No significant wasting of muscles noted, no bony deformities  Extremities - No clubbing, cyanosis or edema  Skin - Warm, dry, and intact. No rash, erythema, or pallor; small open area at the top of the midsternal incision  Psychiatric - Mood, affect, and behavior appear normal      NEUROLOGICAL EXAM     Mental status   [x] Awake, alert, oriented   [x]Affect attention and concentration appear appropriate  [x]Recent and remote memory appears unremarkable  [x]Speech normal without dysarthria or aphasia, comprehension and repetition intact.    COMMENTS:    Cranial Nerves [x]No VF deficit to confrontation,  no papilledema on fundoscopic exam.  [x]PERRLA, EOMI, no nystagmus, conjugate eye movements, no ptosis  [x]Face symmetric  [x]Facial sensation intact  [x]Tongue midline no atrophy or fasciculations present  [x]Palate midline, hearing to finger rub normal bilaterally  [x]Shoulder shrug and SCM testing normal bilaterally  COMMENTS:    Motor   [x]5/5 strength x 4 extremities  [x]Normal bulk and tone  []No tremor present  [x]No rigidity or bradykinesia noted  COMMENTS: mild PLAN:  1. Continue Primidone 100/200  2. Francisco Trio device trial   3. Consider Klonopin if no improvement. 4. Follow up with CT surgery regarding incision   5. Avoid caffeine, manage stress/anxiety in the setting of essential tremor   6. Return in about 3 months (around 3/20/2023) for follow up, sooner if worsening.     Monica Mao DNP, APRN

## 2022-12-20 NOTE — Clinical Note
Adeel! I saw Ms. Fazal Tay today in clinic for her tremor. Her midsternal incision looks good except for a small area on the top of the incision. It appears to be open but no obvious drainage. No swelling or redness either. I just wanted to give you a heads up, she had seen cardiology earlier today and they didn't address it per patient. Thank you!    Donis patterson

## 2022-12-21 ENCOUNTER — OFFICE VISIT (OUTPATIENT)
Dept: CARDIOTHORACIC SURGERY | Age: 62
End: 2022-12-21

## 2022-12-21 VITALS
SYSTOLIC BLOOD PRESSURE: 126 MMHG | HEIGHT: 65 IN | WEIGHT: 182 LBS | OXYGEN SATURATION: 97 % | DIASTOLIC BLOOD PRESSURE: 77 MMHG | HEART RATE: 104 BPM | BODY MASS INDEX: 30.32 KG/M2

## 2022-12-21 DIAGNOSIS — Z95.1 S/P CABG (CORONARY ARTERY BYPASS GRAFT): Primary | ICD-10-CM

## 2022-12-21 PROCEDURE — 99024 POSTOP FOLLOW-UP VISIT: CPT | Performed by: SURGERY

## 2022-12-21 ASSESSMENT — ENCOUNTER SYMPTOMS
CONSTIPATION: 0
APNEA: 0
CHEST TIGHTNESS: 0
NAUSEA: 0
COLOR CHANGE: 0
WHEEZING: 0
SINUS PAIN: 0
DIARRHEA: 0
TROUBLE SWALLOWING: 0
PHOTOPHOBIA: 0
BLOOD IN STOOL: 0
SHORTNESS OF BREATH: 0

## 2022-12-21 NOTE — PROGRESS NOTES
Subjective:      Patient ID: Casey Crowley is a 63 y.o. female. HPI  Underwent open CABG with no complications. Presents now with small area of the upper sternum with open wound. Review of Systems   Constitutional:  Negative for activity change, appetite change, chills, diaphoresis, fatigue, fever and unexpected weight change. HENT:  Negative for congestion, hearing loss, sinus pain and trouble swallowing. Eyes:  Negative for photophobia and visual disturbance. Respiratory:  Negative for apnea, chest tightness, shortness of breath and wheezing. Cardiovascular:  Negative for chest pain, palpitations and leg swelling. Gastrointestinal:  Negative for blood in stool, constipation, diarrhea and nausea. Endocrine: Negative for cold intolerance, polyphagia and polyuria. Genitourinary:  Negative for difficulty urinating, flank pain, menstrual problem and vaginal bleeding. Musculoskeletal:  Negative for gait problem, neck pain and neck stiffness. Skin:  Negative for color change. Neurological:  Negative for dizziness, tremors, seizures and speech difficulty. Hematological:  Negative for adenopathy. Psychiatric/Behavioral:  Negative for dysphoric mood. Objective:   Physical Exam  Eyes:      Pupils: Pupils are equal, round, and reactive to light. Cardiovascular:      Rate and Rhythm: Normal rate and regular rhythm. Heart sounds: Normal heart sounds. Pulmonary:      Breath sounds: Normal breath sounds. Abdominal:      Palpations: Abdomen is soft. Musculoskeletal:         General: Normal range of motion. Skin:     General: Skin is warm. Capillary Refill: Capillary refill takes 2 to 3 seconds. Neurological:      General: No focal deficit present. Psychiatric:         Behavior: Behavior normal.     Sternal wound with no erythema or drainage. 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   This overlaps a sternal wire in this same area seen on the CXR. However, there is no visible sternal wire seen in the base of the wound. Will continue to follow conservatively. If it does not close on its own, she will require excision of the sternal wire beneath this area. Plan:      F/u 2 weeks to reassess wound.         Gertrudis Soares MD

## 2022-12-28 DIAGNOSIS — S21.109A WOUND OF STERNAL REGION: Primary | ICD-10-CM

## 2022-12-28 RX ORDER — AMOXICILLIN AND CLAVULANATE POTASSIUM 500; 125 MG/1; MG/1
1 TABLET, FILM COATED ORAL 2 TIMES DAILY
Qty: 20 TABLET | Refills: 0 | Status: SHIPPED | OUTPATIENT
Start: 2022-12-28 | End: 2023-01-07

## 2022-12-28 NOTE — TELEPHONE ENCOUNTER
Received patient message regarding new sternal wound opening. Discussed with Dr. Yamileth Silva, who recommended wound care with washing with soap and water, then packing with moistened saline gauze. Augmentin 500-125 mg BID x 10 days e-scribed to [address] [place] [address] in [place], [place].      ROB Watkins-BC

## 2023-01-04 ENCOUNTER — OFFICE VISIT (OUTPATIENT)
Dept: CARDIOTHORACIC SURGERY | Age: 63
End: 2023-01-04

## 2023-01-04 VITALS
SYSTOLIC BLOOD PRESSURE: 127 MMHG | OXYGEN SATURATION: 97 % | DIASTOLIC BLOOD PRESSURE: 76 MMHG | HEIGHT: 65 IN | WEIGHT: 182 LBS | HEART RATE: 74 BPM | BODY MASS INDEX: 30.32 KG/M2

## 2023-01-04 DIAGNOSIS — T81.89XA PROTRUDING STERNAL WIRES, INITIAL ENCOUNTER: Primary | ICD-10-CM

## 2023-01-04 PROCEDURE — 99024 POSTOP FOLLOW-UP VISIT: CPT | Performed by: SURGERY

## 2023-01-04 ASSESSMENT — ENCOUNTER SYMPTOMS
APNEA: 0
CONSTIPATION: 0
PHOTOPHOBIA: 0
TROUBLE SWALLOWING: 0
NAUSEA: 0
COLOR CHANGE: 0
SINUS PAIN: 0
WHEEZING: 0
DIARRHEA: 0
CHEST TIGHTNESS: 0
SHORTNESS OF BREATH: 0
BLOOD IN STOOL: 0

## 2023-01-04 NOTE — PROGRESS NOTES
Rebecca Beckett (:  1960) is a 63 y.o. female,Established patient, here for evaluation of the following chief complaint(s):  Follow-up         ASSESSMENT/PLAN:  1. Protruding sternal wires, initial encounter  -     CT CHEST WO CONTRAST; Future      Evidence of fistula track from the skin to a deeper portion of the sternal wound, likely a sternal wire. Will obtain chest CT scan to clarify. Will plan OR for removal of infected sternal wire and open packing of wound. Subjective   SUBJECTIVE/OBJECTIVE:  HPI    Review of Systems   Constitutional:  Negative for activity change, appetite change, chills, diaphoresis, fatigue, fever and unexpected weight change. HENT:  Negative for congestion, hearing loss, sinus pain and trouble swallowing. Eyes:  Negative for photophobia and visual disturbance. Respiratory:  Negative for apnea, chest tightness, shortness of breath and wheezing. Cardiovascular:  Negative for chest pain, palpitations and leg swelling. Gastrointestinal:  Negative for blood in stool, constipation, diarrhea and nausea. Endocrine: Negative for cold intolerance, polyphagia and polyuria. Genitourinary:  Negative for difficulty urinating, flank pain, menstrual problem and vaginal bleeding. Musculoskeletal:  Negative for gait problem, neck pain and neck stiffness. Skin:  Negative for color change. Neurological:  Negative for dizziness, tremors, seizures and speech difficulty. Hematological:  Negative for adenopathy. Psychiatric/Behavioral:  Negative for dysphoric mood. Objective   Physical Exam  HENT:      Head: Normocephalic. Left Ear: Tympanic membrane normal.   Eyes:      Pupils: Pupils are equal, round, and reactive to light. Cardiovascular:      Rate and Rhythm: Normal rate and regular rhythm. Heart sounds: Normal heart sounds. Pulmonary:      Breath sounds: Normal breath sounds. Abdominal:      Palpations: Abdomen is soft.    Musculoskeletal: General: Normal range of motion. Skin:     General: Skin is warm. Capillary Refill: Capillary refill takes less than 2 seconds. Neurological:      Mental Status: She is alert and oriented to person, place, and time. Psychiatric:         Behavior: Behavior normal.                An electronic signature was used to authenticate this note.     --Lori Kim MD

## 2023-01-06 ENCOUNTER — HOSPITAL ENCOUNTER (OUTPATIENT)
Dept: CT IMAGING | Age: 63
Discharge: HOME | End: 2023-01-06

## 2023-01-06 DIAGNOSIS — Z01.810 PREOP CARDIOVASCULAR EXAM: ICD-10-CM

## 2023-01-06 DIAGNOSIS — T81.89XA PROTRUDING STERNAL WIRES, INITIAL ENCOUNTER: ICD-10-CM

## 2023-01-06 DIAGNOSIS — Z01.810 PREOP CARDIOVASCULAR EXAM: Primary | ICD-10-CM

## 2023-01-06 LAB — SARS-COV-2, PCR: NOT DETECTED

## 2023-01-06 PROCEDURE — 71250 CT THORAX DX C-: CPT

## 2023-01-09 ENCOUNTER — ANESTHESIA EVENT (OUTPATIENT)
Dept: OPERATING ROOM | Age: 63
End: 2023-01-09

## 2023-01-09 ENCOUNTER — ANESTHESIA (OUTPATIENT)
Dept: OPERATING ROOM | Age: 63
End: 2023-01-09

## 2023-01-09 ENCOUNTER — HOSPITAL ENCOUNTER (OUTPATIENT)
Age: 63
Setting detail: SURGERY ADMIT
Discharge: HOME | End: 2023-01-09
Attending: SURGERY | Admitting: SURGERY

## 2023-01-09 VITALS
TEMPERATURE: 97.3 F | WEIGHT: 180 LBS | OXYGEN SATURATION: 100 % | HEIGHT: 65 IN | HEART RATE: 91 BPM | DIASTOLIC BLOOD PRESSURE: 92 MMHG | RESPIRATION RATE: 18 BRPM | BODY MASS INDEX: 29.99 KG/M2 | SYSTOLIC BLOOD PRESSURE: 139 MMHG

## 2023-01-09 DIAGNOSIS — R94.30 CARDIAC LV EJECTION FRACTION OF 35-39%: ICD-10-CM

## 2023-01-09 DIAGNOSIS — T81.89XA PROTRUDING STERNAL WIRES, INITIAL ENCOUNTER: Primary | ICD-10-CM

## 2023-01-09 DIAGNOSIS — I51.9 LEFT VENTRICULAR DYSFUNCTION: ICD-10-CM

## 2023-01-09 LAB
ANION GAP SERPL CALCULATED.3IONS-SCNC: 14 MMOL/L (ref 7–19)
APTT: 31.5 SEC (ref 26–36.2)
BUN BLDV-MCNC: 9 MG/DL (ref 8–23)
CALCIUM SERPL-MCNC: 10 MG/DL (ref 8.8–10.2)
CHLORIDE BLD-SCNC: 107 MMOL/L (ref 98–111)
CO2: 22 MMOL/L (ref 22–29)
CREAT SERPL-MCNC: 0.7 MG/DL (ref 0.5–0.9)
EKG P AXIS: 64 DEGREES
EKG P-R INTERVAL: 164 MS
EKG Q-T INTERVAL: 362 MS
EKG QRS DURATION: 72 MS
EKG QTC CALCULATION (BAZETT): 398 MS
EKG T AXIS: 137 DEGREES
GFR SERPL CREATININE-BSD FRML MDRD: >60 ML/MIN/{1.73_M2}
GLUCOSE BLD-MCNC: 101 MG/DL (ref 74–109)
HCT VFR BLD CALC: 38.6 % (ref 37–47)
HEMOGLOBIN: 12.8 G/DL (ref 12–16)
INR BLD: 1.04 (ref 0.88–1.18)
MCH RBC QN AUTO: 28.3 PG (ref 27–31)
MCHC RBC AUTO-ENTMCNC: 33.2 G/DL (ref 33–37)
MCV RBC AUTO: 85.4 FL (ref 81–99)
PDW BLD-RTO: 13.9 % (ref 11.5–14.5)
PLATELET # BLD: 268 K/UL (ref 130–400)
PMV BLD AUTO: 10.2 FL (ref 9.4–12.3)
POTASSIUM SERPL-SCNC: 4.2 MMOL/L (ref 3.5–4.9)
PROTHROMBIN TIME: 13.6 SEC (ref 12–14.6)
RBC # BLD: 4.52 M/UL (ref 4.2–5.4)
SODIUM BLD-SCNC: 143 MMOL/L (ref 136–145)
WBC # BLD: 5 K/UL (ref 4.8–10.8)

## 2023-01-09 PROCEDURE — 85610 PROTHROMBIN TIME: CPT

## 2023-01-09 PROCEDURE — 2500000003 HC RX 250 WO HCPCS: Performed by: ANESTHESIOLOGY

## 2023-01-09 PROCEDURE — 2709999900 HC NON-CHARGEABLE SUPPLY: Performed by: SURGERY

## 2023-01-09 PROCEDURE — 93005 ELECTROCARDIOGRAM TRACING: CPT

## 2023-01-09 PROCEDURE — 85730 THROMBOPLASTIN TIME PARTIAL: CPT

## 2023-01-09 PROCEDURE — 3700000000 HC ANESTHESIA ATTENDED CARE: Performed by: SURGERY

## 2023-01-09 PROCEDURE — 36415 COLL VENOUS BLD VENIPUNCTURE: CPT

## 2023-01-09 PROCEDURE — 2580000003 HC RX 258: Performed by: ANESTHESIOLOGY

## 2023-01-09 PROCEDURE — 7100000001 HC PACU RECOVERY - ADDTL 15 MIN: Performed by: SURGERY

## 2023-01-09 PROCEDURE — 7100000000 HC PACU RECOVERY - FIRST 15 MIN: Performed by: SURGERY

## 2023-01-09 PROCEDURE — 7100000011 HC PHASE II RECOVERY - ADDTL 15 MIN: Performed by: SURGERY

## 2023-01-09 PROCEDURE — 99024 POSTOP FOLLOW-UP VISIT: CPT | Performed by: SURGERY

## 2023-01-09 PROCEDURE — A4216 STERILE WATER/SALINE, 10 ML: HCPCS | Performed by: ANESTHESIOLOGY

## 2023-01-09 PROCEDURE — 80048 BASIC METABOLIC PNL TOTAL CA: CPT

## 2023-01-09 PROCEDURE — 7100000010 HC PHASE II RECOVERY - FIRST 15 MIN: Performed by: SURGERY

## 2023-01-09 PROCEDURE — 2500000003 HC RX 250 WO HCPCS: Performed by: SURGERY

## 2023-01-09 PROCEDURE — 6370000000 HC RX 637 (ALT 250 FOR IP): Performed by: SURGERY

## 2023-01-09 PROCEDURE — 3700000001 HC ADD 15 MINUTES (ANESTHESIA): Performed by: SURGERY

## 2023-01-09 PROCEDURE — 3600000005 HC SURGERY LEVEL 5 BASE: Performed by: SURGERY

## 2023-01-09 PROCEDURE — 6360000002 HC RX W HCPCS: Performed by: ANESTHESIOLOGY

## 2023-01-09 PROCEDURE — 85027 COMPLETE CBC AUTOMATED: CPT

## 2023-01-09 PROCEDURE — 3600000015 HC SURGERY LEVEL 5 ADDTL 15MIN: Performed by: SURGERY

## 2023-01-09 RX ORDER — BUPIVACAINE HYDROCHLORIDE 2.5 MG/ML
INJECTION, SOLUTION INFILTRATION; PERINEURAL PRN
Status: DISCONTINUED | OUTPATIENT
Start: 2023-01-09 | End: 2023-01-09 | Stop reason: HOSPADM

## 2023-01-09 RX ORDER — OXYCODONE HYDROCHLORIDE AND ACETAMINOPHEN 5; 325 MG/1; MG/1
1 TABLET ORAL EVERY 6 HOURS PRN
Qty: 20 TABLET | Refills: 0 | Status: SHIPPED | OUTPATIENT
Start: 2023-01-09 | End: 2023-01-14

## 2023-01-09 RX ORDER — SODIUM HYPOCHLORITE 1.25 MG/ML
SOLUTION TOPICAL PRN
Status: DISCONTINUED | OUTPATIENT
Start: 2023-01-09 | End: 2023-01-09 | Stop reason: HOSPADM

## 2023-01-09 RX ORDER — FENTANYL CITRATE 50 UG/ML
INJECTION, SOLUTION INTRAMUSCULAR; INTRAVENOUS PRN
Status: DISCONTINUED | OUTPATIENT
Start: 2023-01-09 | End: 2023-01-09 | Stop reason: SDUPTHER

## 2023-01-09 RX ORDER — PROPOFOL 10 MG/ML
INJECTION, EMULSION INTRAVENOUS PRN
Status: DISCONTINUED | OUTPATIENT
Start: 2023-01-09 | End: 2023-01-09 | Stop reason: SDUPTHER

## 2023-01-09 RX ORDER — SODIUM CHLORIDE, SODIUM LACTATE, POTASSIUM CHLORIDE, CALCIUM CHLORIDE 600; 310; 30; 20 MG/100ML; MG/100ML; MG/100ML; MG/100ML
INJECTION, SOLUTION INTRAVENOUS CONTINUOUS
Status: DISCONTINUED | OUTPATIENT
Start: 2023-01-09 | End: 2023-01-09 | Stop reason: HOSPADM

## 2023-01-09 RX ORDER — HYDROMORPHONE HYDROCHLORIDE 1 MG/ML
0.25 INJECTION, SOLUTION INTRAMUSCULAR; INTRAVENOUS; SUBCUTANEOUS EVERY 5 MIN PRN
Status: DISCONTINUED | OUTPATIENT
Start: 2023-01-09 | End: 2023-01-09 | Stop reason: HOSPADM

## 2023-01-09 RX ORDER — SODIUM CHLORIDE 0.9 % (FLUSH) 0.9 %
5-40 SYRINGE (ML) INJECTION EVERY 12 HOURS SCHEDULED
Status: DISCONTINUED | OUTPATIENT
Start: 2023-01-09 | End: 2023-01-09 | Stop reason: HOSPADM

## 2023-01-09 RX ORDER — SODIUM CHLORIDE 9 MG/ML
INJECTION, SOLUTION INTRAVENOUS PRN
Status: DISCONTINUED | OUTPATIENT
Start: 2023-01-09 | End: 2023-01-09 | Stop reason: HOSPADM

## 2023-01-09 RX ORDER — HYDROMORPHONE HYDROCHLORIDE 1 MG/ML
0.5 INJECTION, SOLUTION INTRAMUSCULAR; INTRAVENOUS; SUBCUTANEOUS EVERY 5 MIN PRN
Status: DISCONTINUED | OUTPATIENT
Start: 2023-01-09 | End: 2023-01-09 | Stop reason: HOSPADM

## 2023-01-09 RX ORDER — SODIUM CHLORIDE, SODIUM LACTATE, POTASSIUM CHLORIDE, CALCIUM CHLORIDE 600; 310; 30; 20 MG/100ML; MG/100ML; MG/100ML; MG/100ML
INJECTION, SOLUTION INTRAVENOUS CONTINUOUS PRN
Status: DISCONTINUED | OUTPATIENT
Start: 2023-01-09 | End: 2023-01-09 | Stop reason: SDUPTHER

## 2023-01-09 RX ORDER — SODIUM CHLORIDE 0.9 % (FLUSH) 0.9 %
5-40 SYRINGE (ML) INJECTION PRN
Status: DISCONTINUED | OUTPATIENT
Start: 2023-01-09 | End: 2023-01-09 | Stop reason: HOSPADM

## 2023-01-09 RX ORDER — ONDANSETRON 2 MG/ML
4 INJECTION INTRAMUSCULAR; INTRAVENOUS
Status: DISCONTINUED | OUTPATIENT
Start: 2023-01-09 | End: 2023-01-09 | Stop reason: HOSPADM

## 2023-01-09 RX ADMIN — SODIUM CHLORIDE, POTASSIUM CHLORIDE, SODIUM LACTATE AND CALCIUM CHLORIDE: 600; 310; 30; 20 INJECTION, SOLUTION INTRAVENOUS at 13:06

## 2023-01-09 RX ADMIN — FENTANYL CITRATE 50 MCG: 50 INJECTION, SOLUTION INTRAMUSCULAR; INTRAVENOUS at 13:13

## 2023-01-09 RX ADMIN — PROPOFOL 60 MG: 10 INJECTION, EMULSION INTRAVENOUS at 13:22

## 2023-01-09 RX ADMIN — PROPOFOL 30 MG: 10 INJECTION, EMULSION INTRAVENOUS at 13:26

## 2023-01-09 RX ADMIN — FAMOTIDINE 20 MG: 10 INJECTION, SOLUTION INTRAVENOUS at 11:47

## 2023-01-09 ASSESSMENT — ENCOUNTER SYMPTOMS: SHORTNESS OF BREATH: 1

## 2023-01-09 ASSESSMENT — PAIN - FUNCTIONAL ASSESSMENT: PAIN_FUNCTIONAL_ASSESSMENT: 0-10

## 2023-01-09 ASSESSMENT — LIFESTYLE VARIABLES: SMOKING_STATUS: 0

## 2023-01-09 NOTE — DISCHARGE SUMMARY
Physician Discharge Summary     Patient ID:  Melani Echeverria  [id number]  44 y.o.  1960    Admit date: 1/9/2023    Discharge date and time: 1/9/23    Admitting Physician: Shyla Persaud MD     Discharge Physician: same    Admission Diagnoses: Status post hardware removal [Z98.890]    Discharge Diagnoses: same    Admission Condition: fair    Discharged Condition: good    Indication for Admission: elective wire removal    Hospital Course: Patient underwent elective removal of an infected sternal wire. The wound was packed open. She tolerated the procedure well with no complications. She is going to be discharged on the same day as the procedure and return to my clinic in 1 week.       Treatments: surgery: as above    Discharge Exam:  /69   Pulse 81   Temp 97.8 °F (36.6 °C) (Temporal)   Resp 22   Ht 5' 5\" (1.651 m)   Wt 180 lb (81.6 kg)   SpO2 94%   BMI 29.95 kg/m²     General Appearance:    Alert, cooperative, no distress, appears stated age   Head:    Normocephalic, without obvious abnormality, atraumatic   Eyes:    PERRL, conjunctiva/corneas clear, EOM's intact, fundi     benign, both eyes   Ears:    Normal TM's and external ear canals, both ears   Nose:   Nares normal, septum midline, mucosa normal, no drainage    or sinus tenderness   Throat:   Lips, mucosa, and tongue normal; teeth and gums normal   Neck:   Supple, symmetrical, trachea midline, no adenopathy;     thyroid:  no enlargement/tenderness/nodules; no carotid    bruit or JVD   Back:     Symmetric, no curvature, ROM normal, no CVA tenderness   Lungs:     Clear to auscultation bilaterally, respirations unlabored   Chest Wall:    No tenderness or deformity    Heart:    Regular rate and rhythm, S1 and S2 normal, no murmur, rub   or gallop   Breast Exam:    No tenderness, masses, or nipple abnormality   Abdomen:     Soft, non-tender, bowel sounds active all four quadrants,     no masses, no organomegaly   Genitalia:    Normal female without lesion, discharge or tenderness   Rectal:    Normal tone ;guaiac negative stool   Extremities:   Extremities normal, atraumatic, no cyanosis or edema   Pulses:   2+ and symmetric all extremities   Skin:   Skin color, texture, turgor normal, no rashes or lesions   Lymph nodes:   Cervical, supraclavicular, and axillary nodes normal   Neurologic:   CNII-XII intact, normal strength, sensation and reflexes     throughout       Disposition: home    In process/preliminary results:  Outstanding Order Results       Date and Time Order Name Status Description    1/9/2023 11:03 AM EKG 12 Lead Preliminary             Patient Instructions:   Current Discharge Medication List        START taking these medications    Details   oxyCODONE-acetaminophen (PERCOCET) 5-325 MG per tablet Take 1 tablet by mouth every 6 hours as needed for Pain for up to 5 days. Intended supply: 5 days.  Take lowest dose possible to manage pain Max Daily Amount: 4 tablets  Qty: 20 tablet, Refills: 0    Comments: Reduce doses taken as pain becomes manageable  Associated Diagnoses: Protruding sternal wires, initial encounter           CONTINUE these medications which have CHANGED    Details   sacubitril-valsartan (ENTRESTO) 24-26 MG per tablet Take 1 tablet by mouth 2 times daily  Qty: 60 tablet, Refills: 3    Associated Diagnoses: Left ventricular dysfunction; Cardiac LV ejection fraction of 35-39%           CONTINUE these medications which have NOT CHANGED    Details   Continuous Blood Gluc  (FREESTYLE SEEMA 2 READER) BRANNON USE AS DIRECTED      Continuous Blood Gluc Sensor (FREESTYLE SEEMA 2 SENSOR) MISC USE AS DIRECTED      aspirin 81 MG chewable tablet Take 1 tablet by mouth daily  Qty: 30 tablet, Refills: 3      atorvastatin (LIPITOR) 20 MG tablet Take 1 tablet by mouth nightly  Qty: 30 tablet, Refills: 3      metoprolol tartrate (LOPRESSOR) 25 MG tablet Take 1 tablet by mouth 2 times daily  Qty: 60 tablet, Refills: 3      clopidogrel (PLAVIX) 75 MG tablet Take 1 tablet by mouth daily  Qty: 30 tablet, Refills: 3      levothyroxine (SYNTHROID) 137 MCG tablet Take 137 mcg by mouth Daily      insulin glargine (LANTUS) 100 UNIT/ML injection vial Inject 53 Units into the skin every morning      primidone (MYSOLINE) 50 MG tablet Take 3 tablets in the morning and 4 tablets at night. Qty: 360 tablet, Refills: 5    Associated Diagnoses: Essential tremor; Tremor      HUMALOG KWIKPEN 100 UNIT/ML SOPN INJECT 20 UNITS SUBCUTANEOUSLY THREE TIMES DAILY WITH MEALS      gemfibrozil (LOPID) 600 MG tablet Take 1 tablet by mouth 2 times daily           Activity: activity as tolerated  Diet: cardiac diet  Wound Care: as directed    Follow-up with [place] in 1 week.   Signed:  Neal Castillo MD  1/9/2023  1:45 PM

## 2023-01-09 NOTE — BRIEF OP NOTE
Brief Postoperative Note      Patient: Jeramy November  YOB: 1960  MRN: [id number]    Date of Procedure: 1/9/2023    Pre-Op Diagnosis: Status post hardware removal [Z98.890]    Post-Op Diagnosis: Same       Procedure(s):  STERNAL WIRE HARDWARE REMOVAL    Surgeon(s):  Adore Rivero MD    Assistant:  * No surgical staff found *    Anesthesia: General    Estimated Blood Loss (mL): Minimal    Complications: None    Specimens:   * No specimens in log *    Implants:  * No implants in log *      Drains: * No LDAs found *    Findings: upper most sternal wire removed, wound packed open    Electronically signed by Adore Rivero MD on 1/9/2023 at 1:37 PM

## 2023-01-09 NOTE — ANESTHESIA POSTPROCEDURE EVALUATION
Department of Anesthesiology  Postprocedure Note    Patient: Melody Coe  MRN: [id number]  YOB: 1960  Date of evaluation: 1/9/2023      Procedure Summary     Date: 01/09/23 Room / Location: [address]    Anesthesia Start: 1306 Anesthesia Stop:     Procedure: STERNAL WIRE HARDWARE REMOVAL Diagnosis:       Status post hardware removal      (Status post hardware removal [Z98.890])    Surgeons: Gabriella Boland MD Responsible Provider: Arcenio Lyman DO    Anesthesia Type: MAC ASA Status: 3          Anesthesia Type: No value filed.     Monet Phase I: Monet Score: 10    Monet Phase II:        Anesthesia Post Evaluation    Patient location during evaluation: PACU  Patient participation: complete - patient participated  Level of consciousness: awake  Pain score: 0  Airway patency: patent  Nausea & Vomiting: no nausea and no vomiting  Complications: no  Cardiovascular status: blood pressure returned to baseline  Respiratory status: acceptable  Hydration status: stable

## 2023-01-09 NOTE — ANESTHESIA PRE PROCEDURE
Department of Anesthesiology  Preprocedure Note       Name:  Mirta Daniels   Age:  63 y.o.  :  1960                                          MRN:  [id number]         Date:  2023      Surgeon: Selina Banks):  Aurelio Rojas MD    Procedure: Procedure(s):  STERNAL WIRE HARDWARE REMOVAL    Medications prior to admission:   Prior to Admission medications    Medication Sig Start Date End Date Taking? Authorizing Provider   Continuous Blood Gluc  (FREESTYLE SEEMA 2 READER) BRANNON USE AS DIRECTED 22   Historical Provider, MD   Continuous Blood Gluc Sensor (FREESTYLE SEEMA 2 SENSOR) MISC USE AS DIRECTED 22   Historical Provider, MD   sacubitril-valsartan (ENTRESTO) 24-26 MG per tablet Take 1 tablet by mouth 2 times daily 12/15/22   Alexey Escalera MD   aspirin 81 MG chewable tablet Take 1 tablet by mouth daily 10/26/22   Aurelio Rojas MD   atorvastatin (LIPITOR) 20 MG tablet Take 1 tablet by mouth nightly 10/26/22   Aurelio Rojas MD   metoprolol tartrate (LOPRESSOR) 25 MG tablet Take 1 tablet by mouth 2 times daily 10/26/22   Aurelio Rojas MD   clopidogrel (PLAVIX) 75 MG tablet Take 1 tablet by mouth daily 10/27/22   Aurelio Rojas MD   levothyroxine (SYNTHROID) 137 MCG tablet Take 137 mcg by mouth Daily    Historical Provider, MD   insulin glargine (LANTUS) 100 UNIT/ML injection vial Inject 53 Units into the skin every morning    Historical Provider, MD   primidone (MYSOLINE) 50 MG tablet Take 3 tablets in the morning and 4 tablets at night. 10/18/22   ROB Johnson   HUMALOG KWIKPEN 100 UNIT/ML SOPN INJECT 20 UNITS SUBCUTANEOUSLY THREE TIMES DAILY WITH MEALS 21   Historical Provider, MD   gemfibrozil (LOPID) 600 MG tablet Take 1 tablet by mouth 2 times daily 21   Historical Provider, MD       Current medications:    No current outpatient medications on file. No current facility-administered medications for this visit.        Allergies:  No Known Allergies    Problem List:    Patient Active Problem List   Diagnosis Code    Hypercholesteremia E78.00    Atypical chest pain R07.89    Heart disease I51.9    Acute coronary syndrome ([facility] [address] I24.9    CAD in native artery I25.10       Past Medical History:        Diagnosis Date    Acquired cataract     Diabetes ([facility] [address] 2     Essential tremor     Hypercholesteremia        Past Surgical History:        Procedure Laterality Date    CORONARY ARTERY BYPASS GRAFT N/A 10/21/2022    OPEN STERNUM CABG CORONARY ARTERY BYPASS X 4 WITH LEFT INTERNAL MAMMARY ARTERY, ENDOSCOPIC VEIN HARVEST WITH PERFUSION. TRANSESOPHAGEAL ECHOCARDIOGRAM. performed by Marycruz Taylor MD at [place] [address] History:    Social History     Tobacco Use    Smoking status: Never    Smokeless tobacco: Never   Substance Use Topics    Alcohol use: Never                                Counseling given: Not Answered      Vital Signs (Current): There were no vitals filed for this visit.                                            BP Readings from Last 3 Encounters:   01/09/23 128/74   01/04/23 127/76   12/21/22 126/77       NPO Status:                                                                                 BMI:   Wt Readings from Last 3 Encounters:   01/09/23 180 lb (81.6 kg)   01/04/23 182 lb (82.6 kg)   12/21/22 182 lb (82.6 kg)     There is no height or weight on file to calculate BMI.    CBC:   Lab Results   Component Value Date/Time    WBC 4.0 11/29/2022 10:29 PM    RBC 4.18 11/29/2022 10:29 PM    HGB 11.6 11/29/2022 10:29 PM    HCT 36.5 11/29/2022 10:29 PM    MCV 87.3 11/29/2022 10:29 PM    RDW 14.0 11/29/2022 10:29 PM     11/29/2022 10:29 PM       CMP:   Lab Results   Component Value Date/Time     11/29/2022 10:29 PM    K 3.6 11/29/2022 10:29 PM     11/29/2022 10:29 PM    CO2 24 11/29/2022 10:29 PM    BUN 12 11/29/2022 10:29 PM    CREATININE 0.7 11/29/2022 10:29 PM    LABGLOM >60 11/29/2022 10:29 PM    GLUCOSE 174 11/29/2022 10:29 PM PROT 7.7 11/29/2022 10:29 PM    CALCIUM 9.6 11/29/2022 10:29 PM    BILITOT <0.2 11/29/2022 10:29 PM    ALKPHOS 88 11/29/2022 10:29 PM    AST 20 11/29/2022 10:29 PM    ALT 15 11/29/2022 10:29 PM       POC Tests:   No results for input(s): POCGLU, POCNA, POCK, POCCL, POCBUN, POCHEMO, POCHCT in the last 72 hours. Coags:   Lab Results   Component Value Date/Time    PROTIME 13.2 11/29/2022 10:29 PM    INR 1.01 11/29/2022 10:29 PM    APTT 38.7 11/29/2022 10:29 PM       HCG (If Applicable): No results found for: PREGTESTUR, PREGSERUM, HCG, HCGQUANT     ABGs:   Lab Results   Component Value Date/Time    PHART 7.370 10/21/2022 03:21 PM    PO2ART 149.0 10/21/2022 03:21 PM    CTL5OLO 41.0 10/21/2022 03:21 PM    UOW2XLY 23.7 10/21/2022 03:21 PM    BEART -1.5 10/21/2022 03:21 PM    H1IJENOV 97.0 10/21/2022 03:21 PM        Type & Screen (If Applicable):  No results found for: LABABO, LABRH    Drug/Infectious Status (If Applicable):  No results found for: HIV, HEPCAB    COVID-19 Screening (If Applicable):   Lab Results   Component Value Date/Time    COVID19 Not Detected 01/06/2023 03:28 PM           Anesthesia Evaluation  Patient summary reviewed no history of anesthetic complications:   Airway: Mallampati: I  TM distance: >3 FB   Neck ROM: full  Mouth opening: > = 3 FB   Dental:          Pulmonary:normal exam  breath sounds clear to auscultation  (+) shortness of breath (Improved since CABG):      (-) asthma, recent URI, sleep apnea and not a current smoker          Patient did not smoke on day of surgery.                  Cardiovascular:  Exercise tolerance: good (>4 METS),   (+) past MI:, CAD:, CHF: diastolic,     (-) pacemaker, hypertension, CABG/stent and  angina    ECG reviewed  Rhythm: regular  Rate: normal  Echocardiogram reviewed    Cleared by cardiology     Beta Blocker:  Dose within 24 Hrs         Neuro/Psych:      (-) seizures, TIA and CVA           GI/Hepatic/Renal:        (-) GERD, liver disease and no renal disease       Endo/Other:    (+) DiabetesType II DM, using insulin, .    (-) hypothyroidism, hyperthyroidism               Abdominal:             Vascular:     - DVT and PE. Other Findings:             Anesthesia Plan      MAC     ASA 3     (Preop famotidine  Discussed conversion to GETA if necessary)  Induction: intravenous. MIPS: Postoperative opioids intended and Prophylactic antiemetics administered. Anesthetic plan and risks discussed with patient and child/children. Use of blood products discussed with patient and child/children whom consented to blood products.                      Ritchie Nathan MD   1/9/2023

## 2023-01-10 NOTE — OP NOTE
[facility] [address] [facility]                                OPERATIVE REPORT    PATIENT NAME: Wilmer Ochoa                     :        1960  MED REC NO:   [id number]                              ROOM:  ACCOUNT NO:   [de-identified]                           ADMIT DATE: 2023  PROVIDER:     Constantin Blood MD    DATE OF PROCEDURE:  2023    PREOPERATIVE DIAGNOSIS:  Infected sternal wire. POSTOPERATIVE DIAGNOSIS:  Infected sternal wire. PROCEDURE:  Removal of infected sternal wire and open packing of sternal  wound. ATTENDING:  Constantin Blood MD    ASSISTANT:  None available. HISTORY:  The patient is a 73-year-old female who underwent an open  coronary artery bypass grafting 2 months ago. She did well from a  cardiac standpoint. She developed a fistula tract in upper portion of  her sternal incision that did not close despite antibiotics. Chest CT  scan showed that the wound appeared to communicate with the uppermost  sternal wire and therefore, she was indicated for removal of the sternal  wire given the likelihood that it had become infected. The patient  understood the risks and benefits and agreed to proceed. DESCRIPTION OF PROCEDURE:  The patient was undergoing monitored  anesthesia care with sedation. Local anesthesia was used in the wound. A time-out was performed. We then initiated the operation by opening  the incision to approximately 3 cm length and then dividing the scar  tissue around the uppermost sternal wire. This was then removed with a  . The wound itself appeared to be relatively free of  infection and the sternum was quite stable to palpation. The wound was  then packed with gauze soaked in Dakin solution. She was then returned  to the PACU in stable condition. I was attending for this case. I was  present for the entire procedure.         Neda Gama MD    D: 2023 14:53:43      T: 01/09/2023 15:31:55     [email or web address]  Job#: [id number]     Doc#: [id number]    CC:

## 2023-01-13 ASSESSMENT — ENCOUNTER SYMPTOMS
APNEA: 0
SHORTNESS OF BREATH: 0
BLOOD IN STOOL: 0
SINUS PAIN: 0
DIARRHEA: 0
TROUBLE SWALLOWING: 0
CONSTIPATION: 0
CHEST TIGHTNESS: 0
WHEEZING: 0
NAUSEA: 0
COLOR CHANGE: 0
PHOTOPHOBIA: 0

## 2023-01-13 NOTE — H&P
Department of Cardiothoracic Surgery    CHIEF COMPLAINT:  No chief complaint on file. HISTORY OF PRESENT ILLNESS:      The patient is a 63 y.o. female with significant past medical history of CABG who presents with superficial wound infection due to an infected sternal wire. Past Medical History:   Diagnosis Date    Acquired cataract     Diabetes ([address] 2     Essential tremor     Hypercholesteremia        Social History     Socioeconomic History    Marital status:      Spouse name: Not on file    Number of children: 6    Years of education: Not on file    Highest education level: Not on file   Occupational History    Occupation:      Comment: retired   Tobacco Use    Smoking status: Never    Smokeless tobacco: Never   Vaping Use    Vaping Use: Never used   Substance and Sexual Activity    Alcohol use: Never    Drug use: Never    Sexual activity: Not on file     Comment: had 6 kids, 5 survive   Other Topics Concern    Not on file   Social History Narrative    CODE STATUS: LIMITED CODE - NO INTUBATION, it was discussed that normally needs intubation after CPR nilson survive but pt declines that and is aware that she would be unlikely to have beenfit from CPR then    HEALTH CARE PROXY: her daughter, Mrs. Stevens Gains: independently    DOMICILED: has no stairs in the home, has 2 steps to enter the home, lives alone, no pets     Social Determinants of Health     Financial Resource Strain: Not on file   Food Insecurity: Not on file   Transportation Needs: Not on file   Physical Activity: Not on file   Stress: Not on file   Social Connections: Not on file   Intimate Partner Violence: Not on file   Housing Stability: Not on file       Past Surgical History:   Procedure Laterality Date    CORONARY ARTERY BYPASS GRAFT N/A 10/21/2022    OPEN STERNUM CABG CORONARY ARTERY BYPASS X 4 WITH LEFT INTERNAL MAMMARY ARTERY, ENDOSCOPIC VEIN HARVEST WITH PERFUSION.   TRANSESOPHAGEAL ECHOCARDIOGRAM. performed by Pierre Hidalgo MD at [address] N/A 2023    STERNAL WIRE HARDWARE [facility]       No Known Allergies    No current facility-administered medications for this encounter. Current Outpatient Medications:     oxyCODONE-acetaminophen (PERCOCET) 5-325 MG per tablet, Take 1 tablet by mouth every 6 hours as needed for Pain for up to 5 days. Intended supply: 5 days.  Take lowest dose possible to manage pain Max Daily Amount: 4 tablets, Disp: 20 tablet, Rfl: 0    sacubitril-valsartan (ENTRESTO) 24-26 MG per tablet, Take 1 tablet by mouth 2 times daily, Disp: 60 tablet, Rfl: 3    Continuous Blood Gluc  (FREESTYLE SEEMA 2 READER) BRANNON, USE AS DIRECTED, Disp: , Rfl:     Continuous Blood Gluc Sensor (FREESTYLE SEEMA 2 SENSOR) MISC, USE AS DIRECTED, Disp: , Rfl:     aspirin 81 MG chewable tablet, Take 1 tablet by mouth daily, Disp: 30 tablet, Rfl: 3    atorvastatin (LIPITOR) 20 MG tablet, Take 1 tablet by mouth nightly, Disp: 30 tablet, Rfl: 3    metoprolol tartrate (LOPRESSOR) 25 MG tablet, Take 1 tablet by mouth 2 times daily, Disp: 60 tablet, Rfl: 3    clopidogrel (PLAVIX) 75 MG tablet, Take 1 tablet by mouth daily, Disp: 30 tablet, Rfl: 3    levothyroxine (SYNTHROID) 137 MCG tablet, Take 137 mcg by mouth Daily, Disp: , Rfl:     insulin glargine (LANTUS) 100 UNIT/ML injection vial, Inject 53 Units into the skin every morning, Disp: , Rfl:     primidone (MYSOLINE) 50 MG tablet, Take 3 tablets in the morning and 4 tablets at night., Disp: 360 tablet, Rfl: 5    HUMALOG KWIKPEN 100 UNIT/ML SOPN, INJECT 20 UNITS SUBCUTANEOUSLY THREE TIMES DAILY WITH MEALS, Disp: , Rfl:     gemfibrozil (LOPID) 600 MG tablet, Take 1 tablet by mouth 2 times daily, Disp: , Rfl:     Family History   Problem Relation Age of Onset    No Known Problems Mother         2nd hand smoker    Heart Disease Father         smoker    Heart Attack Father          at age 58 and had 3 MIs in the 4 months preceding the last and fatal one    Heart Disease Sister         2nd hand smoker    Heart Attack Sister 32    No Known Problems Brother         2nd hand smoker    No Known Problems Brother         2nd hand smoker    No Known Problems Brother         2nd hand smoker    No Known Problems Brother         2nd hand smoker    No Known Problems Daughter     No Known Problems Daughter     No Known Problems Daughter     No Known Problems Daughter     No Known Problems Daughter         adopted out    No Known Problems Son         No current facility-administered medications for this encounter. Current Outpatient Medications:     oxyCODONE-acetaminophen (PERCOCET) 5-325 MG per tablet, Take 1 tablet by mouth every 6 hours as needed for Pain for up to 5 days. Intended supply: 5 days.  Take lowest dose possible to manage pain Max Daily Amount: 4 tablets, Disp: 20 tablet, Rfl: 0    sacubitril-valsartan (ENTRESTO) 24-26 MG per tablet, Take 1 tablet by mouth 2 times daily, Disp: 60 tablet, Rfl: 3    Continuous Blood Gluc  (FREESTYLE SEEMA 2 READER) BRANNON, USE AS DIRECTED, Disp: , Rfl:     Continuous Blood Gluc Sensor (FREESTYLE SEEMA 2 SENSOR) MISC, USE AS DIRECTED, Disp: , Rfl:     aspirin 81 MG chewable tablet, Take 1 tablet by mouth daily, Disp: 30 tablet, Rfl: 3    atorvastatin (LIPITOR) 20 MG tablet, Take 1 tablet by mouth nightly, Disp: 30 tablet, Rfl: 3    metoprolol tartrate (LOPRESSOR) 25 MG tablet, Take 1 tablet by mouth 2 times daily, Disp: 60 tablet, Rfl: 3    clopidogrel (PLAVIX) 75 MG tablet, Take 1 tablet by mouth daily, Disp: 30 tablet, Rfl: 3    levothyroxine (SYNTHROID) 137 MCG tablet, Take 137 mcg by mouth Daily, Disp: , Rfl:     insulin glargine (LANTUS) 100 UNIT/ML injection vial, Inject 53 Units into the skin every morning, Disp: , Rfl:     primidone (MYSOLINE) 50 MG tablet, Take 3 tablets in the morning and 4 tablets at night., Disp: 360 tablet, Rfl: 5    HUMALOG KWIKPEN 100 UNIT/ML SOPN, INJECT 20 UNITS SUBCUTANEOUSLY THREE TIMES DAILY WITH MEALS, Disp: , Rfl:     gemfibrozil (LOPID) 600 MG tablet, Take 1 tablet by mouth 2 times daily, Disp: , Rfl:      REVIEW OF SYSTEMS:  Review of Systems   Constitutional:  Negative for activity change, appetite change, chills, diaphoresis, fatigue, fever and unexpected weight change. HENT:  Negative for congestion, hearing loss, sinus pain and trouble swallowing. Eyes:  Negative for photophobia and visual disturbance. Respiratory:  Negative for apnea, chest tightness, shortness of breath and wheezing. Cardiovascular:  Negative for chest pain, palpitations and leg swelling. Gastrointestinal:  Negative for blood in stool, constipation, diarrhea and nausea. Endocrine: Negative for cold intolerance, polyphagia and polyuria. Genitourinary:  Negative for difficulty urinating, flank pain, menstrual problem and vaginal bleeding. Musculoskeletal:  Negative for gait problem, neck pain and neck stiffness. Skin:  Negative for color change. Neurological:  Negative for dizziness, tremors, seizures and speech difficulty. Hematological:  Negative for adenopathy. Psychiatric/Behavioral:  Negative for dysphoric mood. PHYSICAL EXAM:    Physical Exam  Vitals and nursing note reviewed. Constitutional:       General: She is not in acute distress. Appearance: She is well-developed. HENT:      Head: Normocephalic. Eyes:      Conjunctiva/sclera: Conjunctivae normal.      Pupils: Pupils are equal, round, and reactive to light. Neck:      Thyroid: No thyromegaly. Vascular: No JVD. Trachea: No tracheal deviation. Cardiovascular:      Rate and Rhythm: Regular rhythm. Heart sounds: Normal heart sounds. No murmur heard. No friction rub. No gallop. Pulmonary:      Effort: Pulmonary effort is normal. No respiratory distress. Breath sounds: Normal breath sounds. No wheezing or rales.    Chest:      Chest wall: No tenderness. Abdominal:      General: Bowel sounds are normal. There is no distension. Palpations: Abdomen is soft. There is no mass. Tenderness: There is no abdominal tenderness. Musculoskeletal:         General: No tenderness. Normal range of motion. Cervical back: Normal range of motion and neck supple. Lymphadenopathy:      Cervical: No cervical adenopathy. Skin:     General: Skin is warm and dry. Neurological:      Mental Status: She is alert and oriented to person, place, and time. Cranial Nerves: No cranial nerve deficit. Deep Tendon Reflexes: Reflexes are normal and symmetric. Psychiatric:         Behavior: Behavior normal.         Thought Content: Thought content normal.         Judgment: Judgment normal.        DATA:  Wt Readings from Last 3 Encounters:   01/09/23 180 lb (81.6 kg)   01/04/23 182 lb (82.6 kg)   12/21/22 182 lb (82.6 kg)     Temp Readings from Last 3 Encounters:   01/09/23 97.3 °F (36.3 °C) (Temporal)   11/29/22 99 °F (37.2 °C) (Oral)   10/26/22 97.2 °F (36.2 °C) (Temporal)     BP Readings from Last 3 Encounters:   01/09/23 (!) 139/92   01/04/23 127/76   12/21/22 126/77     Pulse Readings from Last 3 Encounters:   01/09/23 91   01/04/23 74   12/21/22 (!) 104      Chest CT scan reviewed by me. There is no sign of deep sternal infection. Skin opening immediately adjacent to upper most sternal wire. ASSESSMENT AND PLAN:      Patient is stable for a surgical procedure to remove the infected sternal wire. She is doing well s/p CABG. Will proceed with wire removal under MAC.       Gertrudis Soares MD

## 2023-01-18 ENCOUNTER — TELEPHONE (OUTPATIENT)
Dept: VASCULAR SURGERY | Age: 63
End: 2023-01-18

## 2023-01-18 DIAGNOSIS — L08.9 STERNAL WOUND INFECTION: Primary | ICD-10-CM

## 2023-01-18 DIAGNOSIS — S21.101A STERNAL WOUND INFECTION: Primary | ICD-10-CM

## 2023-01-18 RX ORDER — HYDROCODONE BITARTRATE AND ACETAMINOPHEN 10; 325 MG/1; MG/1
1 TABLET ORAL EVERY 6 HOURS PRN
Qty: 12 TABLET | Refills: 0 | Status: SHIPPED | OUTPATIENT
Start: 2023-01-18 | End: 2023-01-21

## 2023-01-18 RX ORDER — DOXYCYCLINE HYCLATE 100 MG/1
100 CAPSULE ORAL 2 TIMES DAILY
Qty: 20 CAPSULE | Refills: 0 | Status: SHIPPED | OUTPATIENT
Start: 2023-01-18 | End: 2023-01-28

## 2023-01-18 RX ORDER — SODIUM HYPOCHLORITE 1.25 MG/ML
1 SOLUTION TOPICAL DAILY
Qty: 1 EACH | Refills: 0 | Status: SHIPPED | OUTPATIENT
Start: 2023-01-18

## 2023-01-18 NOTE — TELEPHONE ENCOUNTER
Ms. Jarrett Craig contacted our office with concern over her sternal wound from removal of sternal wire. Patient stated that she and her daughter noticed an odor. The drainage is a light to dark yellow. There is also some redness around the incision. She denied any fever or chills. No diarrhea. Asked for patient to have her daughter send a picture of wound. Wound care orders changed to twice a day with Dakins solution. Doxycycline 100 mg BID x 10 days. Pain medication changed to Norco 7.5 mg #12, no refills. Sent to [facility] in [place], [place]. Patient has appointment next week for recheck of wound. She was instructed to call with any questions or concerns.

## 2023-01-19 ENCOUNTER — TELEPHONE (OUTPATIENT)
Dept: CARDIOLOGY CLINIC | Age: 63
End: 2023-01-19

## 2023-01-19 NOTE — TELEPHONE ENCOUNTER
Per Dr. Hla Frederick it was to see if she needs an ICD so lets r/s it for one month. You can send patient to scheduling to  it.

## 2023-01-19 NOTE — TELEPHONE ENCOUNTER
Received a call from patient advising that she has an echo scheduled next Wednesday however she still has an open sternal wound. Echo dept advised her they could do the echo but due to the open wound it would be limited pictures. Patient would like to know if she needs to r/s her echo until her wound heals or if limited pictures would be okay?

## 2023-01-19 NOTE — TELEPHONE ENCOUNTER
Lizy Isaac. Pharmacy pend.    Called and spoke with patient, advised per Dr. Lola Sahu may push her echo out one month. Gave number to central scheduling to call and push out.

## 2023-01-25 ENCOUNTER — OFFICE VISIT (OUTPATIENT)
Dept: CARDIOTHORACIC SURGERY | Age: 63
End: 2023-01-25

## 2023-01-25 VITALS
HEIGHT: 65 IN | OXYGEN SATURATION: 98 % | BODY MASS INDEX: 30.82 KG/M2 | HEART RATE: 88 BPM | WEIGHT: 185 LBS | SYSTOLIC BLOOD PRESSURE: 128 MMHG | DIASTOLIC BLOOD PRESSURE: 80 MMHG

## 2023-01-25 DIAGNOSIS — S21.101A INFECTION OF WOUND OF STERNUM: Primary | ICD-10-CM

## 2023-01-25 DIAGNOSIS — L08.9 INFECTION OF WOUND OF STERNUM: Primary | ICD-10-CM

## 2023-01-25 PROCEDURE — 99024 POSTOP FOLLOW-UP VISIT: CPT | Performed by: SURGERY

## 2023-01-25 ASSESSMENT — ENCOUNTER SYMPTOMS
CHEST TIGHTNESS: 0
COLOR CHANGE: 0
NAUSEA: 0
SINUS PAIN: 0
BLOOD IN STOOL: 0
PHOTOPHOBIA: 0
CONSTIPATION: 0
TROUBLE SWALLOWING: 0
SHORTNESS OF BREATH: 0
WHEEZING: 0
APNEA: 0
DIARRHEA: 0

## 2023-01-25 NOTE — PROGRESS NOTES
Jihan Seaman (:  1960) is a 63 y.o. female,Established patient, here for evaluation of the following chief complaint(s): Wound Check         ASSESSMENT/PLAN:  Underwent removal of a sternal wire that was associated with a superficial wound infection. The wire was removed and the wound packed open. It has been followed by Alan Valiente NP and been healing well. Subjective   SUBJECTIVE/OBJECTIVE:  Wound Check   As above    Review of Systems   Constitutional:  Negative for activity change, appetite change, chills, diaphoresis, fatigue, fever and unexpected weight change. HENT:  Negative for congestion, hearing loss, sinus pain and trouble swallowing. Eyes:  Negative for photophobia and visual disturbance. Respiratory:  Negative for apnea, chest tightness, shortness of breath and wheezing. Cardiovascular:  Negative for chest pain, palpitations and leg swelling. Gastrointestinal:  Negative for blood in stool, constipation, diarrhea and nausea. Endocrine: Negative for cold intolerance, polyphagia and polyuria. Genitourinary:  Negative for difficulty urinating, flank pain, menstrual problem and vaginal bleeding. Musculoskeletal:  Negative for gait problem, neck pain and neck stiffness. Skin:  Negative for color change. Neurological:  Negative for dizziness, tremors, seizures and speech difficulty. Hematological:  Negative for adenopathy. Psychiatric/Behavioral:  Negative for dysphoric mood. Objective   Physical Exam  HENT:      Head: Normocephalic. Nose: Nose normal.      Mouth/Throat:      Mouth: Mucous membranes are moist.   Cardiovascular:      Rate and Rhythm: Regular rhythm. Heart sounds: Normal heart sounds. Pulmonary:      Breath sounds: Normal breath sounds. Abdominal:      General: Abdomen is flat. Skin:     General: Skin is warm. Capillary Refill: Capillary refill takes less than 2 seconds.    Neurological:      Mental Status: She is oriented to person, place, and time. Psychiatric:         Behavior: Behavior normal.                An electronic signature was used to authenticate this note.     --Francine Storm MD

## 2023-01-30 RX ORDER — LEVOTHYROXINE SODIUM 137 UG/1
137 TABLET ORAL DAILY
COMMUNITY

## 2023-01-30 RX ORDER — INSULIN GLARGINE 100 [IU]/ML
INJECTION, SOLUTION SUBCUTANEOUS DAILY
COMMUNITY

## 2023-01-30 RX ORDER — GEMFIBROZIL 600 MG/1
600 TABLET, FILM COATED ORAL
COMMUNITY

## 2023-01-30 RX ORDER — PRIMIDONE 50 MG/1
50 TABLET ORAL 2 TIMES DAILY
COMMUNITY

## 2023-01-30 RX ORDER — INSULIN LISPRO 100 [IU]/ML
20 INJECTION, SOLUTION INTRAVENOUS; SUBCUTANEOUS 3 TIMES DAILY
COMMUNITY

## 2023-02-10 ENCOUNTER — OFFICE VISIT (OUTPATIENT)
Dept: CARDIOLOGY | Facility: CLINIC | Age: 63
End: 2023-02-10

## 2023-02-10 VITALS
SYSTOLIC BLOOD PRESSURE: 104 MMHG | WEIGHT: 185 LBS | HEIGHT: 65 IN | OXYGEN SATURATION: 99 % | HEART RATE: 84 BPM | BODY MASS INDEX: 30.82 KG/M2 | DIASTOLIC BLOOD PRESSURE: 60 MMHG

## 2023-02-10 DIAGNOSIS — I25.10 CORONARY ARTERY DISEASE INVOLVING NATIVE CORONARY ARTERY OF NATIVE HEART WITHOUT ANGINA PECTORIS: Primary | ICD-10-CM

## 2023-02-10 DIAGNOSIS — Z79.4 TYPE 2 DIABETES MELLITUS WITHOUT COMPLICATION, WITH LONG-TERM CURRENT USE OF INSULIN: ICD-10-CM

## 2023-02-10 DIAGNOSIS — E11.9 TYPE 2 DIABETES MELLITUS WITHOUT COMPLICATION, WITH LONG-TERM CURRENT USE OF INSULIN: ICD-10-CM

## 2023-02-10 DIAGNOSIS — I50.22 CHRONIC SYSTOLIC CONGESTIVE HEART FAILURE: ICD-10-CM

## 2023-02-10 DIAGNOSIS — E78.2 MIXED HYPERLIPIDEMIA: ICD-10-CM

## 2023-02-10 PROCEDURE — 93000 ELECTROCARDIOGRAM COMPLETE: CPT | Performed by: INTERNAL MEDICINE

## 2023-02-10 PROCEDURE — 99204 OFFICE O/P NEW MOD 45 MIN: CPT | Performed by: INTERNAL MEDICINE

## 2023-02-10 RX ORDER — ATORVASTATIN CALCIUM 20 MG/1
20 TABLET, FILM COATED ORAL DAILY
COMMUNITY

## 2023-02-10 RX ORDER — CLOPIDOGREL BISULFATE 75 MG/1
75 TABLET ORAL DAILY
COMMUNITY

## 2023-02-10 RX ORDER — ASPIRIN 81 MG/1
81 TABLET ORAL DAILY
COMMUNITY

## 2023-02-10 NOTE — PROGRESS NOTES
Subjective:     Encounter Date:02/10/2023      Patient ID: Lynette Pedroza is a 63 y.o. female with coronary artery disease, status post four-vessel coronary artery bypass grafting on 10/20/2022 (LIMA to LAD, SVG to OM1, SVG to OM 2, SVG to RCA) at [facility], later with sternal wire complications requiring sternal wire removal, also with hypertension, hyperlipidemia and type 2 diabetes mellitus who presents today to establish care.    Chief Complaint: Here to establish care, coronary artery disease with previous coronary artery bypass grafting    History of Present Illness    This is a 63-year-old female who presents today as a self-referral to establish care given a history of coronary artery disease with previous coronary artery bypass grafting.  The patient went coronary artery bypass grafting in October 2022.  She recovered relatively well but had a sternal wire complication that required removal of the sternal wire.  She has continued to heal well after this revision.  She has some soreness around the incision but otherwise no expanding warmth, erythema, drainage.  She denies having any chest pain.  Breathing is stable with no significant shortness of breath, dyspnea with exertion.  She denies having orthopnea, PND, edema.  At the time of her event, she was found to have left ventricular systolic dysfunction.  She denies having any change in her breathing since being out of the hospital.  Weight has been stable.  She was prescribed Entresto but never started the medication.  She does remain on dual antiplatelet therapy.  She denies having any significant bleeding issues.  She remains on beta-blocker therapy.  Blood pressure and heart rate have been well controlled.  She denies having side effects with current medications.  She does have diabetes.  This is insulin requiring.  She reports reasonable control.  Overall, she says that she continues to heal well.  She does have follow-up with CT surgery in  the near future.    The following portions of the patient's history were reviewed and updated as appropriate: allergies, current medications, past family history, past medical history, past social history, past surgical history and problem list.     Past Medical History:   Diagnosis Date   • CAD (coronary artery disease)    • Diabetes mellitus (HCC)    • Disease of thyroid gland    • Hyperlipidemia    • Hypertension      Past Surgical History:   Procedure Laterality Date   • CORONARY ARTERY BYPASS GRAFT         Current Outpatient Medications:   •  aspirin 81 MG EC tablet, Take 81 mg by mouth Daily., Disp: , Rfl:   •  atorvastatin (LIPITOR) 20 MG tablet, Take 20 mg by mouth Daily., Disp: , Rfl:   •  clopidogrel (PLAVIX) 75 MG tablet, Take 75 mg by mouth Daily., Disp: , Rfl:   •  Continuous Blood Gluc  (FreeStyle Neymar 2 Webster) device, , Disp: , Rfl:   •  Continuous Blood Gluc Sensor (FreeStyle Neymar 2 Sensor) misc, , Disp: , Rfl:   •  gemfibrozil (LOPID) 600 MG tablet, Take 600 mg by mouth 2 (Two) Times a Day Before Meals., Disp: , Rfl:   •  insulin glargine (LANTUS, SEMGLEE) 100 UNIT/ML injection, Inject  under the skin into the appropriate area as directed Daily., Disp: , Rfl:   •  Insulin Lispro, 1 Unit Dial, (HUMALOG) 100 UNIT/ML solution pen-injector, Inject 20 Units under the skin into the appropriate area as directed 3 (Three) Times a Day., Disp: , Rfl:   •  levothyroxine (SYNTHROID, LEVOTHROID) 137 MCG tablet, Take 137 mcg by mouth Daily., Disp: , Rfl:   •  metoprolol tartrate (LOPRESSOR) 25 MG tablet, Take 25 mg by mouth 2 (Two) Times a Day., Disp: , Rfl:   •  primidone (MYSOLINE) 50 MG tablet, Take 50 mg by mouth 2 (Two) Times a Day. TAKE 3 TABLETS IN AM AND 4 TABLETS PM, Disp: , Rfl:   •  sacubitril-valsartan (ENTRESTO) 24-26 MG tablet, Take 1 tablet by mouth 2 (Two) Times a Day., Disp: , Rfl:     No Known Allergies    Social History     Tobacco Use   • Smoking status: Never   • Smokeless tobacco:  Never   Substance Use Topics   • Alcohol use: Never     Family History   Problem Relation Age of Onset   • Heart disease Father    • Heart disease Sister      Review of Systems   Constitutional: Negative for chills, fever and weight loss.   HENT: Negative for congestion and hearing loss.    Eyes: Negative for blurred vision and pain.   Cardiovascular: Negative for chest pain, dyspnea on exertion, leg swelling, orthopnea, palpitations, paroxysmal nocturnal dyspnea and syncope.   Respiratory: Negative for cough, shortness of breath and wheezing.    Endocrine: Negative for cold intolerance and heat intolerance.   Hematologic/Lymphatic: Negative for adenopathy and bleeding problem.   Skin: Negative for color change, poor wound healing and rash.   Musculoskeletal: Negative for myalgias and neck pain.   Gastrointestinal: Negative for abdominal pain, nausea and vomiting.   Genitourinary: Negative for dysuria and frequency.   Neurological: Negative for dizziness, headaches, light-headedness, loss of balance and numbness.   Psychiatric/Behavioral: Negative for altered mental status and memory loss.   Allergic/Immunologic: Negative for hives and persistent infections.       ECG 12 Lead    Date/Time: 2/10/2023 1:00 PM  Performed by: Glen Gooden MD  Authorized by: Glen Gooden MD   Previous ECG: no previous ECG available  Rhythm: sinus rhythm  Rate: normal  BPM: 87  Conduction: conduction normal  QRS axis: normal  Other findings: non-specific ST-T wave changes and poor R wave progression    Clinical impression: abnormal EKG             Objective:     Vitals reviewed.   Constitutional:       General: Not in acute distress.     Appearance: Normal and healthy appearance. Well-developed. Not toxic-appearing or diaphoretic.   Eyes:      General: Lids are normal.      Extraocular Movements: Extraocular movements intact.      Pupils: Pupils are equal, round, and reactive to light.   HENT:      Head:  "Normocephalic and atraumatic.      Right Ear: External ear normal.      Left Ear: External ear normal.      Nose: Nose normal.    Mouth/Throat:      Mouth: Mucous membranes are not pale, not dry and not cyanotic.   Neck:      Thyroid: No thyroid mass or thyromegaly.      Vascular: No carotid bruit, hepatojugular reflux or JVD.      Trachea: No tracheal deviation.      Lymphadenopathy: No cervical adenopathy.   Pulmonary:      Effort: Pulmonary effort is normal. No accessory muscle usage or respiratory distress.      Breath sounds: Normal breath sounds. No wheezing. No rhonchi. No rales.   Chest:      Chest wall: Not tender to palpatation.   Cardiovascular:      Normal rate. Regular rhythm.      Murmurs: There is no murmur.      No gallop.   Pulses:     Intact distal pulses.   Edema:     Peripheral edema absent.   Abdominal:      General: Bowel sounds are normal. There is no distension or abdominal bruit.      Palpations: Abdomen is soft.      Tenderness: There is no abdominal tenderness.   Musculoskeletal: Normal range of motion.         General: No tenderness or deformity.      Extremities: No clubbing present.     Cervical back: Normal range of motion and neck supple. No edema. Skin:     General: Skin is warm and dry. There is no cyanosis.      Findings: No erythema or rash.   Neurological:      General: No focal deficit present.      Mental Status: Oriented to person, place, and time and oriented to person, place and time.      Cranial Nerves: No cranial nerve deficit.   Psychiatric:         Attention and Perception: Attention normal.         Mood and Affect: Mood normal.         Speech: Speech normal.         Behavior: Behavior normal. Behavior is cooperative.         Thought Content: Thought content normal.       /60   Pulse 84   Ht 165.1 cm (65\")   Wt 83.9 kg (185 lb)   SpO2 99%   BMI 30.79 kg/m²     Data/Lab Review:     WOO at [facility] 10/21/22:  Left Ventricle: Ejection Fraction 35%.   "     Cardiac cath at [facility] 10/20/22:    HEMODYNAMICS:   LVEDP: 12 mmHg   BP: 130/70   HR: 80/min   There was no gradient seen across the aortic valve.  Ejection   fraction was 35%     CORONARY ANGIOGRAPHY:   Dominance: Right   Left Main: Long mid to distal left main stenosis compromising   lumen probably by 60%   LAD: Left anterior descending was occluded chronically.  Mid and   distal left anterior descending filled faintly via left to left   collateral.  The ramus or high diagonal branch was small in   caliber and narrowed 60% proximally over the long tubular segment   LCx: Left circumflex was a large-caliber vessel.  It supplied 2   obtuse marginal branch.  The first obtuse marginal branch was   normal in caliber.  The second obtuse marginal branch was a   large-caliber vessel and narrowed 90% over a short segment in its   midportion.   RCA: Right coronary artery was a normal-sized vessel.  It had a   long tubular stenosis at the midportion compromising the lumen by   95%.  There was TRACEE I flow down through the distal right   coronary artery.  The posterior descending and posterolateral   branches were normal in caliber and free of significant disease    Lab Results   Component Value Date    CHLPL 222 (H) 10/20/2022    TRIG 168 (H) 10/20/2022    HDL 44 (L) 10/20/2022     10/20/2022         Assessment:          Diagnosis Plan   1. Coronary artery disease involving native coronary artery of native heart without angina pectoris  ECG 12 Lead      2. Chronic systolic congestive heart failure (HCC)  Adult Transthoracic Echo Complete W/ Cont if Necessary Per Protocol      3. Mixed hyperlipidemia        4. Type 2 diabetes mellitus without complication, with long-term current use of insulin ([facility])             Plan:       1.  Coronary artery disease: This patient is clinically stable after coronary artery bypass grafting.  She continues to follow with CT surgery at [facility] after her reoperation  for sternal wire complications.  She is recovering well, however.  She has follow-up in the near future.  She does remain on dual antiplatelet therapy at this time.  She remains on beta-blocker and statin therapies.    2.  Chronic systolic heart failure/ischemic cardiomyopathy: Ejection fraction previously listed as 35%.  The patient was prescribed Entresto but never started the medication.  She is on metoprolol.  She is currently not requiring diuretics nor does she describe any symptoms of heart failure.  Weight has been stable.  No orthopnea, PND, edema, etc.  We will order an echocardiogram for surveillance at this time.  Further plans, including medication additions with Entresto or other goal-directed medical therapies will largely be based on the patient's ejection fraction.  We also discussed that if ejection fraction is less than 35% and remains less than 35%, a primary prevention ICD would be reasonable.    3.  Mixed hyperlipidemia: The patient's LDL cholesterol during her hospital stay in October is noted above with an LDL of 144.  She is now on atorvastatin.  LDL cholesterol goal will be less than 70 long-term.    4.  Type 2 diabetes mellitus: The patient has insulin requiring type 2 diabetes mellitus.  In the future, she may benefit from an SGLT2 inhibitor therapy.  We can discuss this at future visits.    Follow-up will largely be pending the results of the patient's upcoming echocardiogram.

## 2023-03-01 ENCOUNTER — OFFICE VISIT (OUTPATIENT)
Dept: CARDIOTHORACIC SURGERY | Age: 63
End: 2023-03-01

## 2023-03-01 VITALS
OXYGEN SATURATION: 100 % | SYSTOLIC BLOOD PRESSURE: 134 MMHG | HEART RATE: 88 BPM | DIASTOLIC BLOOD PRESSURE: 80 MMHG | BODY MASS INDEX: 30.82 KG/M2 | HEIGHT: 65 IN | WEIGHT: 185 LBS

## 2023-03-01 DIAGNOSIS — T85.79XD: Primary | ICD-10-CM

## 2023-03-01 PROCEDURE — 99024 POSTOP FOLLOW-UP VISIT: CPT | Performed by: SURGERY

## 2023-03-01 ASSESSMENT — ENCOUNTER SYMPTOMS
DIARRHEA: 0
CONSTIPATION: 0
NAUSEA: 0
APNEA: 0
COLOR CHANGE: 0
SINUS PAIN: 0
TROUBLE SWALLOWING: 0
PHOTOPHOBIA: 0
SHORTNESS OF BREATH: 0
WHEEZING: 0
CHEST TIGHTNESS: 0
BLOOD IN STOOL: 0

## 2023-03-01 NOTE — PROGRESS NOTES
Violeta Huff (:  1960) is a 66 y.o. female,Established patient, here for evaluation of the following chief complaint(s):  Follow-up         ASSESSMENT/PLAN:  S/p removal of infected wire. The wound is healing well with minimal residual wound to pack at this point. Recommend d/c packing. Subjective   SUBJECTIVE/OBJECTIVE:  HPI    Review of Systems   Constitutional:  Negative for activity change, appetite change, chills, diaphoresis, fatigue, fever and unexpected weight change. HENT:  Negative for congestion, hearing loss, sinus pain and trouble swallowing. Eyes:  Negative for photophobia and visual disturbance. Respiratory:  Negative for apnea, chest tightness, shortness of breath and wheezing. Cardiovascular:  Negative for chest pain, palpitations and leg swelling. Gastrointestinal:  Negative for blood in stool, constipation, diarrhea and nausea. Endocrine: Negative for cold intolerance, polyphagia and polyuria. Genitourinary:  Negative for difficulty urinating, flank pain, menstrual problem and vaginal bleeding. Musculoskeletal:  Negative for gait problem, neck pain and neck stiffness. Skin:  Negative for color change. Neurological:  Negative for dizziness, tremors, seizures and speech difficulty. Hematological:  Negative for adenopathy. Psychiatric/Behavioral:  Negative for dysphoric mood. Objective   Physical Exam  HENT:      Head: Normocephalic. Right Ear: Tympanic membrane normal.   Eyes:      Pupils: Pupils are equal, round, and reactive to light. Cardiovascular:      Rate and Rhythm: Regular rhythm. Heart sounds: Normal heart sounds. Pulmonary:      Breath sounds: Normal breath sounds. Abdominal:      General: Abdomen is flat. Skin:     Capillary Refill: Capillary refill takes less than 2 seconds. Neurological:      General: No focal deficit present.    Wound granulating well, no drainage       On this date 3/1/2023 I have spent 16 minutes reviewing previous notes, test results and face to face with the patient discussing the diagnosis and importance of compliance with the treatment plan as well as documenting on the day of the visit. An electronic signature was used to authenticate this note.     --Isabel Briceño MD

## 2023-06-07 ENCOUNTER — HOSPITAL ENCOUNTER (OUTPATIENT)
Dept: CARDIOLOGY | Facility: HOSPITAL | Age: 63
Discharge: HOME | End: 2023-06-07

## 2023-06-07 VITALS
SYSTOLIC BLOOD PRESSURE: 104 MMHG | BODY MASS INDEX: 30.82 KG/M2 | DIASTOLIC BLOOD PRESSURE: 60 MMHG | WEIGHT: 185 LBS | HEIGHT: 65 IN

## 2023-06-07 DIAGNOSIS — I50.22 CHRONIC SYSTOLIC CONGESTIVE HEART FAILURE: ICD-10-CM

## 2023-06-07 PROCEDURE — 93306 TTE W/DOPPLER COMPLETE: CPT | Performed by: INTERNAL MEDICINE

## 2023-06-07 PROCEDURE — 93306 TTE W/DOPPLER COMPLETE: CPT

## 2023-06-12 LAB
BH CV ECHO MEAS - AO MAX PG: 9.6 MMHG
BH CV ECHO MEAS - AO MEAN PG: 5 MMHG
BH CV ECHO MEAS - AO ROOT DIAM: 2.6 CM
BH CV ECHO MEAS - AO V2 MAX: 155 CM/SEC
BH CV ECHO MEAS - AO V2 VTI: 31.4 CM
BH CV ECHO MEAS - AVA(I,D): 1.52 CM2
BH CV ECHO MEAS - EDV(CUBED): 67.9 ML
BH CV ECHO MEAS - EDV(MOD-SP4): 63.4 ML
BH CV ECHO MEAS - EF(MOD-SP4): 46.2 %
BH CV ECHO MEAS - ESV(CUBED): 35.9 ML
BH CV ECHO MEAS - ESV(MOD-SP4): 34.1 ML
BH CV ECHO MEAS - FS: 19.1 %
BH CV ECHO MEAS - IVS/LVPW: 1.01 CM
BH CV ECHO MEAS - IVSD: 1.37 CM
BH CV ECHO MEAS - LA DIMENSION: 3.6 CM
BH CV ECHO MEAS - LAT PEAK E' VEL: 8.3 CM/SEC
BH CV ECHO MEAS - LV DIASTOLIC VOL/BSA (35-75): 33.1 CM2
BH CV ECHO MEAS - LV MASS(C)D: 206.9 GRAMS
BH CV ECHO MEAS - LV MAX PG: 2.41 MMHG
BH CV ECHO MEAS - LV MEAN PG: 1 MMHG
BH CV ECHO MEAS - LV SYSTOLIC VOL/BSA (12-30): 17.8 CM2
BH CV ECHO MEAS - LV V1 MAX: 77.6 CM/SEC
BH CV ECHO MEAS - LV V1 VTI: 16.8 CM
BH CV ECHO MEAS - LVIDD: 4.1 CM
BH CV ECHO MEAS - LVIDS: 3.3 CM
BH CV ECHO MEAS - LVOT AREA: 2.8 CM2
BH CV ECHO MEAS - LVOT DIAM: 1.9 CM
BH CV ECHO MEAS - LVPWD: 1.36 CM
BH CV ECHO MEAS - MED PEAK E' VEL: 3.9 CM/SEC
BH CV ECHO MEAS - MV A MAX VEL: 100 CM/SEC
BH CV ECHO MEAS - MV DEC TIME: 0.16 MSEC
BH CV ECHO MEAS - MV E MAX VEL: 79.5 CM/SEC
BH CV ECHO MEAS - MV E/A: 0.8
BH CV ECHO MEAS - RAP SYSTOLE: 5 MMHG
BH CV ECHO MEAS - RVSP: 21.8 MMHG
BH CV ECHO MEAS - SI(MOD-SP4): 15.3 ML/M2
BH CV ECHO MEAS - SV(LVOT): 47.6 ML
BH CV ECHO MEAS - SV(MOD-SP4): 29.3 ML
BH CV ECHO MEAS - TR MAX PG: 16.8 MMHG
BH CV ECHO MEAS - TR MAX VEL: 205 CM/SEC
BH CV ECHO MEASUREMENTS AVERAGE E/E' RATIO: 13.03
LEFT ATRIUM VOLUME INDEX: 18.1 ML/M2
LEFT ATRIUM VOLUME: 34.6 ML

## 2023-06-13 ENCOUNTER — TELEPHONE (OUTPATIENT)
Dept: CARDIOLOGY | Facility: CLINIC | Age: 63
End: 2023-06-13
Payer: MEDICAID

## 2023-06-13 NOTE — TELEPHONE ENCOUNTER
----- Message from Glen Gooden MD sent at 6/12/2023 12:02 PM CDT -----  Please let this patient know that her echocardiogram showed essentially normal systolic function.  No evidence of systolic dysfunction at this time.  Therefore, compared to what was previously thought to be moderate systolic dysfunction, her ejection fraction appears to have improved.  At this time, given that she is otherwise stable, I would recommend a 6-month follow-up with myself or Martha.

## 2023-07-03 ENCOUNTER — OFFICE VISIT (OUTPATIENT)
Dept: NEUROLOGY | Age: 63
End: 2023-07-03

## 2023-07-03 VITALS
SYSTOLIC BLOOD PRESSURE: 122 MMHG | WEIGHT: 185 LBS | HEIGHT: 65 IN | HEART RATE: 68 BPM | BODY MASS INDEX: 30.82 KG/M2 | OXYGEN SATURATION: 99 % | DIASTOLIC BLOOD PRESSURE: 70 MMHG

## 2023-07-03 DIAGNOSIS — G25.0 ESSENTIAL TREMOR: Primary | ICD-10-CM

## 2023-07-03 PROBLEM — E11.9 DIABETES MELLITUS (HCC): Status: ACTIVE | Noted: 2023-07-03

## 2023-07-03 PROCEDURE — 99213 OFFICE O/P EST LOW 20 MIN: CPT | Performed by: NURSE PRACTITIONER

## 2023-07-03 RX ORDER — PRIMIDONE 50 MG/1
TABLET ORAL
Qty: 270 TABLET | Refills: 3 | Status: SHIPPED | OUTPATIENT
Start: 2023-07-03

## 2023-07-03 RX ORDER — PRIMIDONE 250 MG/1
250 TABLET ORAL NIGHTLY
Qty: 120 TABLET | Refills: 2 | Status: SHIPPED | OUTPATIENT
Start: 2023-07-03

## 2023-07-03 NOTE — PROGRESS NOTES
[facility] Neurology Office Note      Patient:   Niru Zamarripa  MR#:    [id number]  Account Number:                         YOB: 1960  Date of Evaluation:  7/3/2023  Time of Note:                          8:40 AM  Primary/Referring Physician:  ROB Delatorre - NP   Consulting Physician:  Eric Pierre DNP, APRN    FOLLOW UP    Chief Complaint   Patient presents with    Follow-up    Tremors     HISTORY OF PRESENT ILLNESS    Niru Zamarripa is a 66y.o. year old female here for follow up of tremor. She was last seen in 2022. Tremor is about the same from prior. She notes bilateral hand tremor, worse in the left hand. Notes head titubation intermittently. Notes intermittent vocal tremor. Worsens with intention/posture, fine motor tasks. Tremor is not interfering with ADLs. She is taking Primidone 100/200. Noted sedation with higher dosing during the daytime. No resting tremor. No rigidity or bradykinesia. Denies gait changes. She has failed Propranolol previously. No tremor inducing medications. No stroke history. There is a family history of Parkinson's. No clear family history of essential tremor. Past Medical History:   Diagnosis Date    Acquired cataract     Diabetes ([address]) 2     Essential tremor     Hypercholesteremia        Past Surgical History:   Procedure Laterality Date    CORONARY ARTERY BYPASS GRAFT N/A 10/21/2022    OPEN STERNUM CABG CORONARY ARTERY BYPASS X 4 WITH LEFT INTERNAL MAMMARY ARTERY, ENDOSCOPIC VEIN HARVEST WITH PERFUSION.   TRANSESOPHAGEAL ECHOCARDIOGRAM. performed by Sunil Driver MD at [address] [place] [address] N/A 2023    STERNAL WIRE HARDWARE REMOVAL performed by Sunil Driver MD at [facility] OR       Family History   Problem Relation Age of Onset    No Known Problems Mother         2nd hand smoker    Heart Disease Father         smoker    Heart Attack Father          at age 58 and had 4 MIs in the 4 months preceding the last and fatal one    Heart

## 2023-08-04 ENCOUNTER — TELEPHONE (OUTPATIENT)
Dept: HEMATOLOGY | Age: 63
End: 2023-08-04

## 2023-08-04 NOTE — TELEPHONE ENCOUNTER
Received referral for oncology from Dr. Fátima Dupree office on 8/3/23. Called patient to verify insurance. Currently patient has no coverage. She does not qualify for Medicaid because she makes too much money in  benefits. Called Dr. Fátima Dupree office and asked that referral be sent to [facility] patient and relayed information.  PF

## 2023-08-15 ENCOUNTER — HOSPITAL ENCOUNTER (OUTPATIENT)
Dept: PREADMISSION TESTING | Age: 63
Setting detail: OUTPATIENT SURGERY
End: 2023-08-15

## 2023-08-15 ENCOUNTER — OFFICE VISIT (OUTPATIENT)
Dept: SURGERY | Age: 63
End: 2023-08-15

## 2023-08-15 VITALS
TEMPERATURE: 97.3 F | OXYGEN SATURATION: 99 % | WEIGHT: 187.6 LBS | HEIGHT: 65 IN | BODY MASS INDEX: 31.25 KG/M2 | HEART RATE: 68 BPM

## 2023-08-15 VITALS — WEIGHT: 190 LBS | BODY MASS INDEX: 31.62 KG/M2

## 2023-08-15 DIAGNOSIS — D03.59 MELANOMA IN SITU OF TORSO EXCLUDING BREAST (HCC): Primary | ICD-10-CM

## 2023-08-15 LAB
ANION GAP SERPL CALCULATED.3IONS-SCNC: 13 MMOL/L (ref 7–19)
BASOPHILS # BLD: 0 K/UL (ref 0–0.2)
BASOPHILS NFR BLD: 0.4 % (ref 0–1)
BUN SERPL-MCNC: 17 MG/DL (ref 8–23)
CALCIUM SERPL-MCNC: 9.5 MG/DL (ref 8.8–10.2)
CHLORIDE SERPL-SCNC: 103 MMOL/L (ref 98–111)
CO2 SERPL-SCNC: 23 MMOL/L (ref 22–29)
CREAT SERPL-MCNC: 0.6 MG/DL (ref 0.5–0.9)
EOSINOPHIL # BLD: 0.1 K/UL (ref 0–0.6)
EOSINOPHIL NFR BLD: 1.5 % (ref 0–5)
ERYTHROCYTE [DISTWIDTH] IN BLOOD BY AUTOMATED COUNT: 13.2 % (ref 11.5–14.5)
GLUCOSE SERPL-MCNC: 241 MG/DL (ref 74–109)
HCT VFR BLD AUTO: 38.2 % (ref 37–47)
HGB BLD-MCNC: 12.8 G/DL (ref 12–16)
IMM GRANULOCYTES # BLD: 0 K/UL
LYMPHOCYTES # BLD: 1.8 K/UL (ref 1.1–4.5)
LYMPHOCYTES NFR BLD: 38.4 % (ref 20–40)
MCH RBC QN AUTO: 28 PG (ref 27–31)
MCHC RBC AUTO-ENTMCNC: 33.5 G/DL (ref 33–37)
MCV RBC AUTO: 83.6 FL (ref 81–99)
MONOCYTES # BLD: 0.5 K/UL (ref 0–0.9)
MONOCYTES NFR BLD: 10.6 % (ref 0–10)
NEUTROPHILS # BLD: 2.3 K/UL (ref 1.5–7.5)
NEUTS SEG NFR BLD: 48.9 % (ref 50–65)
PLATELET # BLD AUTO: 257 K/UL (ref 130–400)
PMV BLD AUTO: 11.3 FL (ref 9.4–12.3)
POTASSIUM SERPL-SCNC: 4.4 MMOL/L (ref 3.5–5)
RBC # BLD AUTO: 4.57 M/UL (ref 4.2–5.4)
SODIUM SERPL-SCNC: 139 MMOL/L (ref 136–145)
WBC # BLD AUTO: 4.6 K/UL (ref 4.8–10.8)

## 2023-08-15 PROCEDURE — 80048 BASIC METABOLIC PNL TOTAL CA: CPT

## 2023-08-15 PROCEDURE — 93005 ELECTROCARDIOGRAM TRACING: CPT | Performed by: ANESTHESIOLOGY

## 2023-08-15 PROCEDURE — 85025 COMPLETE CBC W/AUTO DIFF WBC: CPT

## 2023-08-15 PROCEDURE — 99203 OFFICE O/P NEW LOW 30 MIN: CPT | Performed by: SURGERY

## 2023-08-15 RX ORDER — SODIUM CHLORIDE, SODIUM LACTATE, POTASSIUM CHLORIDE, CALCIUM CHLORIDE 600; 310; 30; 20 MG/100ML; MG/100ML; MG/100ML; MG/100ML
INJECTION, SOLUTION INTRAVENOUS CONTINUOUS
Status: CANCELLED | OUTPATIENT
Start: 2023-08-15

## 2023-08-15 RX ORDER — CELECOXIB 200 MG/1
200 CAPSULE ORAL ONCE
Status: CANCELLED | OUTPATIENT
Start: 2023-08-15 | End: 2023-08-15

## 2023-08-15 RX ORDER — SODIUM CHLORIDE 0.9 % (FLUSH) 0.9 %
5-40 SYRINGE (ML) INJECTION EVERY 12 HOURS SCHEDULED
Status: CANCELLED | OUTPATIENT
Start: 2023-08-15

## 2023-08-15 RX ORDER — ACETAMINOPHEN 325 MG/1
1000 TABLET ORAL ONCE
Status: CANCELLED | OUTPATIENT
Start: 2023-08-15 | End: 2023-08-15

## 2023-08-15 RX ORDER — SODIUM CHLORIDE 9 MG/ML
INJECTION, SOLUTION INTRAVENOUS PRN
Status: CANCELLED | OUTPATIENT
Start: 2023-08-15

## 2023-08-15 RX ORDER — SODIUM CHLORIDE 0.9 % (FLUSH) 0.9 %
5-40 SYRINGE (ML) INJECTION PRN
Status: CANCELLED | OUTPATIENT
Start: 2023-08-15

## 2023-08-15 ASSESSMENT — ENCOUNTER SYMPTOMS
ABDOMINAL PAIN: 0
SORE THROAT: 0
ABDOMINAL DISTENTION: 0
VOMITING: 0
COUGH: 0
EYE REDNESS: 0
CHEST TIGHTNESS: 0
EYE PAIN: 0
COLOR CHANGE: 0
BACK PAIN: 0
CONSTIPATION: 0
SHORTNESS OF BREATH: 0
NAUSEA: 0
DIARRHEA: 0

## 2023-08-15 NOTE — DISCHARGE INSTRUCTIONS
PREOPERATIVE GUIDELINES WHEN RECEIVING ANESTHESIA    Do not eat or drink anything after midnight, the night before your surgery. No gum or candy the morning of surgery. This is extremely important for your safety. Take a bath (or shower) the night before your surgery and you may brush your teeth the morning of your surgery. You will be scheduled to arrive at the hospital 2 hours before your surgery, or follow your surgeon's instructions. Dress comfortably. Wear loose clothing that will be easy to remove and comfortable for your trip home. You may wear eyeglasses or contacts but bring your cases with you as they must be remove before your surgery. Hearing aids and dentures will need to be removed before your surgery. Do not wear any jewelry, including body jewelry. All jewelry will need to be removed prior to your surgery. Do not wear fingernail polish or make-up. It is best not to bring any valuables with you. If you are to stay in the hospital overnight, bring your robe, slippers and personal toiletries that you may need. POSTOPERATIVE GUIDELINES AFTER RECEIVING ANESTHESIA    If you are to go home after your surgery, you will need a responsible adult to drive you home. You will not be able to take public transportation after your discharge from the Operative Care Unit unless you are accompanied by a        responsible adult. On returning home, be sure to follow your physician's orders regarding diet, activity and medications. Remember, surgery with general anesthesia or sedation may leave you sleepy, very tired and with a decreased appetite for 12 to 24 hours. If you develop any post-surgical complications or problems, call your surgeon or [facility] ([phone]). The day before surgery you will receive a phone call from the surgery nurse to let you know what time to arrive on the day of surgery.  This call will usually be between 2-4 PM. If

## 2023-08-15 NOTE — H&P (VIEW-ONLY)
SUBJECTIVE:  Ms. Vick Felton is a 66 y.o. female who presents today with a recent biopsy of a skin lesion to her left upper back that returned at melanoma in-situ. Her daughter first noticed this area in January and she had forgotten to mention it to her physician until July. It was dark and raised. She has a history of some severe sunburns as a child, but religiously wears sunscreen now. She has never had a complete skin exam.      Past Medical History:   Diagnosis Date    Acquired cataract     CAD (coronary artery disease)     hx cabg; sees [facility] cardiology    Cancer ([address])     shoulder/melanoma    Diabetes ([address]) 2     Essential tremor     Heart attack ([address])     Hypercholesteremia     Post-menopausal      Past Surgical History:   Procedure Laterality Date    CORONARY ARTERY BYPASS GRAFT N/A 10/21/2022    OPEN STERNUM CABG CORONARY ARTERY BYPASS X 4 WITH LEFT INTERNAL MAMMARY ARTERY, ENDOSCOPIC VEIN HARVEST WITH PERFUSION.   TRANSESOPHAGEAL ECHOCARDIOGRAM. performed by Radha Roberts MD at [facility] 01/09/2023    STERNAL WIRE HARDWARE REMOVAL performed by Radha Roberts MD at [facility] OR     Current Outpatient Medications   Medication Sig Dispense Refill    primidone (MYSOLINE) 50 MG tablet Take 3 tablets in the morning (Patient taking differently: Take 2 tablets by mouth nightly) 270 tablet 3    primidone (MYSOLINE) 250 MG tablet Take 1 tablet by mouth nightly 120 tablet 2    Continuous Blood Gluc  (FREESTYLE SEEMA 2 READER) BRANNON USE AS DIRECTED      Continuous Blood Gluc Sensor (FREESTYLE SEEMA 2 SENSOR) MISC USE AS DIRECTED      atorvastatin (LIPITOR) 20 MG tablet Take 1 tablet by mouth nightly 30 tablet 3    levothyroxine (SYNTHROID) 137 MCG tablet Take 1 tablet by mouth Daily      insulin glargine (LANTUS) 100 UNIT/ML injection vial Inject 53 Units into the skin every morning      HUMALOG KWIKPEN 100 UNIT/ML SOPN Inject into the skin 3 times daily as needed Sliding scale gemfibrozil (LOPID) 600 MG tablet Take 1 tablet by mouth 2 times daily       No current facility-administered medications for this visit. Allergies: Patient has no known allergies. Family History   Problem Relation Age of Onset    No Known Problems Mother         2nd hand smoker    Heart Disease Father         smoker    Heart Attack Father          at age 58 and had 3 MIs in the 4 months preceding the last and fatal one    Heart Disease Sister         2nd hand smoker    Heart Attack Sister 32    No Known Problems Brother         2nd hand smoker    No Known Problems Brother         2nd hand smoker    No Known Problems Brother         2nd hand smoker    No Known Problems Brother         2nd hand smoker    No Known Problems Daughter     No Known Problems Daughter     No Known Problems Daughter     No Known Problems Daughter     No Known Problems Daughter         adopted out    No Known Problems Son        Social History     Tobacco Use    Smoking status: Never     Passive exposure: Never    Smokeless tobacco: Never   Substance Use Topics    Alcohol use: Never       Review of Systems   Constitutional:  Negative for fatigue, fever and unexpected weight change. HENT:  Negative for hearing loss, nosebleeds and sore throat. Eyes:  Negative for pain, redness and visual disturbance. Respiratory:  Negative for cough, chest tightness and shortness of breath. Cardiovascular:  Negative for chest pain, palpitations and leg swelling. Gastrointestinal:  Negative for abdominal distention, abdominal pain, constipation, diarrhea, nausea and vomiting. Endocrine: Negative for cold intolerance, heat intolerance and polydipsia. Genitourinary:  Negative for difficulty urinating, frequency and urgency. Musculoskeletal:  Negative for back pain, joint swelling and neck pain. Skin:  Negative for color change, rash and wound. Allergic/Immunologic: Negative for environmental allergies and food allergies.

## 2023-08-16 LAB
EKG P AXIS: 60 DEGREES
EKG P-R INTERVAL: 158 MS
EKG Q-T INTERVAL: 366 MS
EKG QRS DURATION: 76 MS
EKG QTC CALCULATION (BAZETT): 392 MS
EKG T AXIS: 118 DEGREES

## 2023-08-16 PROCEDURE — 93010 ELECTROCARDIOGRAM REPORT: CPT | Performed by: INTERNAL MEDICINE

## 2023-08-17 ENCOUNTER — ANESTHESIA (OUTPATIENT)
Dept: OPERATING ROOM | Age: 63
End: 2023-08-17

## 2023-08-17 ENCOUNTER — HOSPITAL ENCOUNTER (OUTPATIENT)
Age: 63
Setting detail: OUTPATIENT SURGERY
Discharge: HOME | End: 2023-08-17
Attending: SURGERY | Admitting: SURGERY

## 2023-08-17 ENCOUNTER — ANESTHESIA EVENT (OUTPATIENT)
Dept: OPERATING ROOM | Age: 63
End: 2023-08-17

## 2023-08-17 VITALS
TEMPERATURE: 98 F | OXYGEN SATURATION: 100 % | HEART RATE: 84 BPM | BODY MASS INDEX: 31.65 KG/M2 | WEIGHT: 190 LBS | SYSTOLIC BLOOD PRESSURE: 144 MMHG | DIASTOLIC BLOOD PRESSURE: 90 MMHG | HEIGHT: 65 IN | RESPIRATION RATE: 18 BRPM

## 2023-08-17 DIAGNOSIS — C43.62 MALIGNANT MELANOMA OF LEFT UPPER EXTREMITY INCLUDING SHOULDER (HCC): ICD-10-CM

## 2023-08-17 LAB
GLUCOSE BLD-MCNC: 157 MG/DL (ref 70–99)
GLUCOSE BLD-MCNC: 175 MG/DL (ref 70–99)
PERFORMED ON: ABNORMAL
PERFORMED ON: ABNORMAL

## 2023-08-17 PROCEDURE — 6370000000 HC RX 637 (ALT 250 FOR IP): Performed by: SURGERY

## 2023-08-17 PROCEDURE — 7100000000 HC PACU RECOVERY - FIRST 15 MIN: Performed by: SURGERY

## 2023-08-17 PROCEDURE — 2580000003 HC RX 258: Performed by: SURGERY

## 2023-08-17 PROCEDURE — 11602 EXC TR-EXT MAL+MARG 1.1-2 CM: CPT | Performed by: SURGERY

## 2023-08-17 PROCEDURE — 6360000002 HC RX W HCPCS: Performed by: NURSE ANESTHETIST, CERTIFIED REGISTERED

## 2023-08-17 PROCEDURE — 3600000003 HC SURGERY LEVEL 3 BASE: Performed by: SURGERY

## 2023-08-17 PROCEDURE — 2500000003 HC RX 250 WO HCPCS: Performed by: NURSE ANESTHETIST, CERTIFIED REGISTERED

## 2023-08-17 PROCEDURE — 6360000002 HC RX W HCPCS: Performed by: SURGERY

## 2023-08-17 PROCEDURE — 7100000001 HC PACU RECOVERY - ADDTL 15 MIN: Performed by: SURGERY

## 2023-08-17 PROCEDURE — C9399 UNCLASSIFIED DRUGS OR BIOLOG: HCPCS | Performed by: NURSE ANESTHETIST, CERTIFIED REGISTERED

## 2023-08-17 PROCEDURE — 6370000000 HC RX 637 (ALT 250 FOR IP): Performed by: ANESTHESIOLOGY

## 2023-08-17 PROCEDURE — 7100000011 HC PHASE II RECOVERY - ADDTL 15 MIN: Performed by: SURGERY

## 2023-08-17 PROCEDURE — 3700000001 HC ADD 15 MINUTES (ANESTHESIA): Performed by: SURGERY

## 2023-08-17 PROCEDURE — 7100000010 HC PHASE II RECOVERY - FIRST 15 MIN: Performed by: SURGERY

## 2023-08-17 PROCEDURE — 12031 INTMD RPR S/A/T/EXT 2.5 CM/<: CPT | Performed by: SURGERY

## 2023-08-17 PROCEDURE — 3700000000 HC ANESTHESIA ATTENDED CARE: Performed by: SURGERY

## 2023-08-17 PROCEDURE — 82962 GLUCOSE BLOOD TEST: CPT

## 2023-08-17 PROCEDURE — 88305 TISSUE EXAM BY PATHOLOGIST: CPT

## 2023-08-17 PROCEDURE — 3600000013 HC SURGERY LEVEL 3 ADDTL 15MIN: Performed by: SURGERY

## 2023-08-17 PROCEDURE — 2709999900 HC NON-CHARGEABLE SUPPLY: Performed by: SURGERY

## 2023-08-17 RX ORDER — LIDOCAINE HYDROCHLORIDE 10 MG/ML
INJECTION, SOLUTION EPIDURAL; INFILTRATION; INTRACAUDAL; PERINEURAL PRN
Status: DISCONTINUED | OUTPATIENT
Start: 2023-08-17 | End: 2023-08-17 | Stop reason: SDUPTHER

## 2023-08-17 RX ORDER — FAMOTIDINE 20 MG/1
20 TABLET, FILM COATED ORAL ONCE
Status: COMPLETED | OUTPATIENT
Start: 2023-08-17 | End: 2023-08-17

## 2023-08-17 RX ORDER — CELECOXIB 200 MG/1
200 CAPSULE ORAL ONCE
Status: COMPLETED | OUTPATIENT
Start: 2023-08-17 | End: 2023-08-17

## 2023-08-17 RX ORDER — SUCCINYLCHOLINE/SOD CL,ISO/PF 100 MG/5ML
SYRINGE (ML) INTRAVENOUS PRN
Status: DISCONTINUED | OUTPATIENT
Start: 2023-08-17 | End: 2023-08-17 | Stop reason: SDUPTHER

## 2023-08-17 RX ORDER — DEXAMETHASONE SODIUM PHOSPHATE 4 MG/ML
INJECTION, SOLUTION INTRA-ARTICULAR; INTRALESIONAL; INTRAMUSCULAR; INTRAVENOUS; SOFT TISSUE PRN
Status: DISCONTINUED | OUTPATIENT
Start: 2023-08-17 | End: 2023-08-17 | Stop reason: SDUPTHER

## 2023-08-17 RX ORDER — LIDOCAINE HYDROCHLORIDE 10 MG/ML
1 INJECTION, SOLUTION EPIDURAL; INFILTRATION; INTRACAUDAL; PERINEURAL
Status: DISCONTINUED | OUTPATIENT
Start: 2023-08-17 | End: 2023-08-17 | Stop reason: HOSPADM

## 2023-08-17 RX ORDER — HYDROMORPHONE HYDROCHLORIDE 1 MG/ML
0.25 INJECTION, SOLUTION INTRAMUSCULAR; INTRAVENOUS; SUBCUTANEOUS EVERY 5 MIN PRN
Status: DISCONTINUED | OUTPATIENT
Start: 2023-08-17 | End: 2023-08-17 | Stop reason: HOSPADM

## 2023-08-17 RX ORDER — HYDROMORPHONE HYDROCHLORIDE 1 MG/ML
0.5 INJECTION, SOLUTION INTRAMUSCULAR; INTRAVENOUS; SUBCUTANEOUS EVERY 5 MIN PRN
Status: DISCONTINUED | OUTPATIENT
Start: 2023-08-17 | End: 2023-08-17 | Stop reason: HOSPADM

## 2023-08-17 RX ORDER — SODIUM CHLORIDE 0.9 % (FLUSH) 0.9 %
5-40 SYRINGE (ML) INJECTION EVERY 12 HOURS SCHEDULED
Status: DISCONTINUED | OUTPATIENT
Start: 2023-08-17 | End: 2023-08-17 | Stop reason: HOSPADM

## 2023-08-17 RX ORDER — SODIUM CHLORIDE 9 MG/ML
INJECTION, SOLUTION INTRAVENOUS PRN
Status: DISCONTINUED | OUTPATIENT
Start: 2023-08-17 | End: 2023-08-17 | Stop reason: HOSPADM

## 2023-08-17 RX ORDER — SODIUM CHLORIDE 0.9 % (FLUSH) 0.9 %
5-40 SYRINGE (ML) INJECTION PRN
Status: DISCONTINUED | OUTPATIENT
Start: 2023-08-17 | End: 2023-08-17 | Stop reason: HOSPADM

## 2023-08-17 RX ORDER — ACETAMINOPHEN 500 MG
1000 TABLET ORAL ONCE
Status: COMPLETED | OUTPATIENT
Start: 2023-08-17 | End: 2023-08-17

## 2023-08-17 RX ORDER — ROCURONIUM BROMIDE 10 MG/ML
INJECTION, SOLUTION INTRAVENOUS PRN
Status: DISCONTINUED | OUTPATIENT
Start: 2023-08-17 | End: 2023-08-17 | Stop reason: SDUPTHER

## 2023-08-17 RX ORDER — SODIUM CHLORIDE, SODIUM LACTATE, POTASSIUM CHLORIDE, CALCIUM CHLORIDE 600; 310; 30; 20 MG/100ML; MG/100ML; MG/100ML; MG/100ML
INJECTION, SOLUTION INTRAVENOUS CONTINUOUS
Status: DISCONTINUED | OUTPATIENT
Start: 2023-08-17 | End: 2023-08-17 | Stop reason: HOSPADM

## 2023-08-17 RX ORDER — FENTANYL CITRATE 50 UG/ML
INJECTION, SOLUTION INTRAMUSCULAR; INTRAVENOUS PRN
Status: DISCONTINUED | OUTPATIENT
Start: 2023-08-17 | End: 2023-08-17 | Stop reason: SDUPTHER

## 2023-08-17 RX ORDER — BUPIVACAINE HYDROCHLORIDE 2.5 MG/ML
INJECTION, SOLUTION INFILTRATION; PERINEURAL PRN
Status: DISCONTINUED | OUTPATIENT
Start: 2023-08-17 | End: 2023-08-17 | Stop reason: ALTCHOICE

## 2023-08-17 RX ORDER — MIDAZOLAM HYDROCHLORIDE 1 MG/ML
INJECTION INTRAMUSCULAR; INTRAVENOUS PRN
Status: DISCONTINUED | OUTPATIENT
Start: 2023-08-17 | End: 2023-08-17 | Stop reason: SDUPTHER

## 2023-08-17 RX ORDER — PROCHLORPERAZINE EDISYLATE 5 MG/ML
5 INJECTION INTRAMUSCULAR; INTRAVENOUS
Status: DISCONTINUED | OUTPATIENT
Start: 2023-08-17 | End: 2023-08-17 | Stop reason: HOSPADM

## 2023-08-17 RX ORDER — HYDROCODONE BITARTRATE AND ACETAMINOPHEN 5; 325 MG/1; MG/1
1 TABLET ORAL EVERY 6 HOURS PRN
Qty: 12 TABLET | Refills: 0 | Status: SHIPPED | OUTPATIENT
Start: 2023-08-17 | End: 2023-08-20

## 2023-08-17 RX ORDER — PROPOFOL 10 MG/ML
INJECTION, EMULSION INTRAVENOUS PRN
Status: DISCONTINUED | OUTPATIENT
Start: 2023-08-17 | End: 2023-08-17 | Stop reason: SDUPTHER

## 2023-08-17 RX ORDER — DEXAMETHASONE SODIUM PHOSPHATE 10 MG/ML
8 INJECTION, SOLUTION INTRAMUSCULAR; INTRAVENOUS ONCE
Status: DISCONTINUED | OUTPATIENT
Start: 2023-08-17 | End: 2023-08-17 | Stop reason: HOSPADM

## 2023-08-17 RX ORDER — ONDANSETRON 2 MG/ML
INJECTION INTRAMUSCULAR; INTRAVENOUS PRN
Status: DISCONTINUED | OUTPATIENT
Start: 2023-08-17 | End: 2023-08-17 | Stop reason: SDUPTHER

## 2023-08-17 RX ORDER — DIPHENHYDRAMINE HYDROCHLORIDE 50 MG/ML
12.5 INJECTION INTRAMUSCULAR; INTRAVENOUS
Status: DISCONTINUED | OUTPATIENT
Start: 2023-08-17 | End: 2023-08-17 | Stop reason: HOSPADM

## 2023-08-17 RX ADMIN — DEXAMETHASONE SODIUM PHOSPHATE 4 MG: 4 INJECTION, SOLUTION INTRAMUSCULAR; INTRAVENOUS at 14:51

## 2023-08-17 RX ADMIN — SUGAMMADEX 300 MG: 100 INJECTION, SOLUTION INTRAVENOUS at 14:56

## 2023-08-17 RX ADMIN — FENTANYL CITRATE 25 MCG: 0.05 INJECTION, SOLUTION INTRAMUSCULAR; INTRAVENOUS at 15:15

## 2023-08-17 RX ADMIN — CELECOXIB 200 MG: 200 CAPSULE ORAL at 12:35

## 2023-08-17 RX ADMIN — Medication 200 MG: at 14:34

## 2023-08-17 RX ADMIN — FENTANYL CITRATE 50 MCG: 0.05 INJECTION, SOLUTION INTRAMUSCULAR; INTRAVENOUS at 14:31

## 2023-08-17 RX ADMIN — ONDANSETRON 4 MG: 2 INJECTION INTRAMUSCULAR; INTRAVENOUS at 14:51

## 2023-08-17 RX ADMIN — MIDAZOLAM 2 MG: 1 INJECTION INTRAMUSCULAR; INTRAVENOUS at 14:18

## 2023-08-17 RX ADMIN — ROCURONIUM BROMIDE 50 MG: 10 INJECTION, SOLUTION INTRAVENOUS at 14:38

## 2023-08-17 RX ADMIN — LIDOCAINE HYDROCHLORIDE 50 MG: 10 INJECTION, SOLUTION EPIDURAL; INFILTRATION; INTRACAUDAL; PERINEURAL at 14:33

## 2023-08-17 RX ADMIN — FAMOTIDINE 20 MG: 20 TABLET, FILM COATED ORAL at 13:11

## 2023-08-17 RX ADMIN — PHENYLEPHRINE HYDROCHLORIDE 200 MCG: 10 INJECTION INTRAVENOUS at 15:01

## 2023-08-17 RX ADMIN — PROPOFOL 150 MG: 10 INJECTION, EMULSION INTRAVENOUS at 14:33

## 2023-08-17 RX ADMIN — SODIUM CHLORIDE, POTASSIUM CHLORIDE, SODIUM LACTATE AND CALCIUM CHLORIDE: 600; 310; 30; 20 INJECTION, SOLUTION INTRAVENOUS at 12:31

## 2023-08-17 RX ADMIN — FENTANYL CITRATE 25 MCG: 0.05 INJECTION, SOLUTION INTRAMUSCULAR; INTRAVENOUS at 15:08

## 2023-08-17 RX ADMIN — ACETAMINOPHEN 1000 MG: 500 TABLET ORAL at 12:35

## 2023-08-17 RX ADMIN — ROCURONIUM BROMIDE 10 MG: 10 INJECTION, SOLUTION INTRAVENOUS at 14:34

## 2023-08-17 RX ADMIN — CEFAZOLIN 2000 MG: 2 INJECTION, POWDER, FOR SOLUTION INTRAMUSCULAR; INTRAVENOUS at 14:42

## 2023-08-17 ASSESSMENT — LIFESTYLE VARIABLES: SMOKING_STATUS: 0

## 2023-08-17 ASSESSMENT — PAIN - FUNCTIONAL ASSESSMENT: PAIN_FUNCTIONAL_ASSESSMENT: NONE - DENIES PAIN

## 2023-08-17 NOTE — INTERVAL H&P NOTE
Update History & Physical    The patient's History and Physical of August 15, 2023 was reviewed with the patient and I examined the patient. There was no change. The surgical site was confirmed by the patient and me. Plan: The risks, benefits, expected outcome, and alternative to the recommended procedure have been discussed with the patient. Patient understands and wants to proceed with the procedure.      Electronically signed by Margarita Christian DO on 3/91/5955 at 2:03 PM

## 2023-08-17 NOTE — ANESTHESIA POSTPROCEDURE EVALUATION
Department of Anesthesiology  Postprocedure Note    Patient: Noelle Uriostegui  MRN: [id number]  YOB: 1960  Date of evaluation: 8/17/2023      Procedure Summary     Date: 08/17/23 Room / Location: [address]    Anesthesia Start:  Anesthesia Stop:     Procedure: LEFT SHOULDER EXCISION OF MELANOMA (Left) Diagnosis:       Malignant melanoma of left upper extremity including shoulder ([address])      (Malignant melanoma of left upper extremity including shoulder ([address]) [N98.09])    Surgeons: Keena Rodriguez DO Responsible Provider:     Anesthesia Type: general ASA Status: 3          Anesthesia Type: No value filed.     Monet Phase I: Monet Score: 10    Monet Phase II:        Anesthesia Post Evaluation    Patient location during evaluation: PACU  Patient participation: waiting for patient participation  Level of consciousness: responsive to physical stimuli  Pain score: 0  Airway patency: patent  Nausea & Vomiting: no nausea and no vomiting  Complications: no  Cardiovascular status: hemodynamically stable  Respiratory status: spontaneous ventilation and nonlabored ventilation  Hydration status: stable  Multimodal analgesia pain management approach

## 2023-08-17 NOTE — DISCHARGE INSTR - ACTIVITY
No heavy lifting. May shower Saturday after removing overlying dressings. Keep steri-strips in place. Do not soak in tub or submerge under water. Do not drive while on pain medications. Avoid constipation. Take colace nightly (up to 300 mg) and miralax daily (up to three doses). Drink 64 ounces of water and take a powdered fiber supplement daily (ie-Metamucil). If you have signs of infection, call you doctor.  These include:   -Increased pain, swelling, warmth, or redness  -Red streaks leading from the incision  -Pus draining from the incision  -A fever > 100.4

## 2023-08-17 NOTE — ANESTHESIA POSTPROCEDURE EVALUATION
Department of Anesthesiology  Postprocedure Note    Patient: Sean Soto  MRN: [id number]  YOB: 1960  Date of evaluation: 8/17/2023      Procedure Summary     Date: 08/17/23 Room / Location: [address]    Anesthesia Start: 7291 Anesthesia Stop: 4769    Procedure: LEFT SHOULDER EXCISION OF MELANOMA (Left) Diagnosis:       Malignant melanoma of left upper extremity including shoulder ([address])      (Malignant melanoma of left upper extremity including shoulder ([address]) [W82.96])    Surgeons: Nehemiah Sanchez DO Responsible Provider: ROB Henao CRNA    Anesthesia Type: general ASA Status: 3          Anesthesia Type: No value filed.     Monet Phase I: Monet Score: 5    Monet Phase II:        Anesthesia Post Evaluation    Patient location during evaluation: PACU  Patient participation: complete - patient participated  Level of consciousness: awake and alert  Pain score: 0  Airway patency: patent  Nausea & Vomiting: no nausea and no vomiting  Complications: no  Cardiovascular status: hemodynamically stable  Respiratory status: spontaneous ventilation and nonlabored ventilation  Hydration status: stable  Multimodal analgesia pain management approach

## 2023-08-17 NOTE — OP NOTE
Operative Note      Patient: Chelsie Shaver  YOB: 1960  MRN: [id number]    Date of Procedure: 8/17/2023    Pre-Op Diagnosis Codes:     * Malignant melanoma of left upper extremity including shoulder ([address]) [C43.62]    Post-Op Diagnosis: Same       Procedure(s):  LEFT SHOULDER EXCISION OF MELANOMA    Surgeon(s):  Wilma Schaeffer DO    Assistant:   First Assistant: Addis Villaseñor    Anesthesia: General    Estimated Blood Loss (mL): Minimal    Complications: None    Specimens:   ID Type Source Tests Collected by Time Destination   A : left shoulder melanoma. short superior. long lateral. Tissue Joint, Shoulder SURGICAL PATHOLOGY Wilma Schaeffer DO 1/59/6164 1458        Implants:  * No implants in log *      Drains: * No LDAs found *    Findings: excision melanoma performed    Wide Local Excision for Primary Cutaneous Melanoma - Excision 1 (Back)  Operation performed with curative intent Yes   Original Breslow thickness of the lesion  Melanoma in situ (MIS)   Clinical margin width  (measured from the edge of the lesion or the prior excision scar) 0.5 cm   Depth of excision Full-thickness skin/subcutaneous tissue down to fascia (melanoma)       Ms. Chelsie Shaver presented to the office with complaints of a skin lesion on her back. she was found to have melanom in-situ and has elected for excision today. Detailed Description of Procedure:     Patient was taken to the main operating room, placed on the operating table in the supine position. After IV Antibiotics were administered, the patient was placed under general anesthesia and turned to the lateral position. The left posterior shoulder was prepped and draped in the usual sterile fashion. A timeout was performed identifying the correct patient, equipment, and antibiotics given. A skin incision was made 0.5 cm around the incision encompassing the mass after 0.25% Marcaine was injected.  A combination of sharp and electrocautery dissection was used to

## 2023-09-28 ENCOUNTER — OFFICE VISIT (OUTPATIENT)
Dept: SURGERY | Age: 63
End: 2023-09-28

## 2023-09-28 VITALS
HEART RATE: 97 BPM | HEIGHT: 65 IN | OXYGEN SATURATION: 98 % | TEMPERATURE: 98.3 F | BODY MASS INDEX: 31.65 KG/M2 | WEIGHT: 190 LBS

## 2023-09-28 DIAGNOSIS — D03.59 MELANOMA IN SITU OF TORSO EXCLUDING BREAST (HCC): Primary | ICD-10-CM

## 2023-09-28 PROCEDURE — 99024 POSTOP FOLLOW-UP VISIT: CPT | Performed by: PHYSICIAN ASSISTANT

## 2023-09-29 ENCOUNTER — TELEPHONE (OUTPATIENT)
Dept: SURGERY | Age: 63
End: 2023-09-29

## 2023-09-29 NOTE — TELEPHONE ENCOUNTER
Called patient and discussed with her that I had contacted Dr. Keara Cabrales regarding a referral appt. . Told her that with her self pay status she would need to pay per office $106  for initial visit and that would not include any procedures done that day, they would be extra. Any follow up appts would be $86 per visit. All of the fee's due in full at time of service. Patient voiced understanding and wishes to schedule appt. Appt made for 12/11/23 at 12:45, phone number and directions given to patient. Office notes to be faxd to [phone].

## 2023-10-30 NOTE — PROGRESS NOTES
Subjective  Sean Soto is a patient Dr. Mekhi Camara who presents with melanoma in situ to the left upper extremity status post excision. She is doing very well. She has no complaints. Objective  Patient Active Problem List    Diagnosis Date Noted    Acute coronary syndrome ([address]) 10/22/2022    CAD in native artery 10/22/2022    Atypical chest pain 10/20/2022    Hypercholesteremia 10/18/2022    Diabetes mellitus ([address]) 07/03/2023    Heart disease 10/20/2022       Current Outpatient Medications   Medication Sig Dispense Refill    primidone (MYSOLINE) 50 MG tablet Take 3 tablets in the morning (Patient taking differently: Take 2 tablets by mouth nightly) 270 tablet 3    primidone (MYSOLINE) 250 MG tablet Take 1 tablet by mouth nightly 120 tablet 2    Continuous Blood Gluc  (FREESTYLE SEEMA 2 READER) BRANNON USE AS DIRECTED      Continuous Blood Gluc Sensor (FREESTYLE SEEMA 2 SENSOR) MISC USE AS DIRECTED      atorvastatin (LIPITOR) 20 MG tablet Take 1 tablet by mouth nightly 30 tablet 3    levothyroxine (SYNTHROID) 137 MCG tablet Take 1 tablet by mouth Daily      insulin glargine (LANTUS) 100 UNIT/ML injection vial Inject 53 Units into the skin every morning      HUMALOG KWIKPEN 100 UNIT/ML SOPN Inject into the skin 3 times daily as needed Sliding scale      gemfibrozil (LOPID) 600 MG tablet Take 1 tablet by mouth 2 times daily       No current facility-administered medications for this visit. Allergies Patient has no known allergies.     Past Medical History:   Diagnosis Date    Acquired cataract     CAD (coronary artery disease)     hx cabg; sees [facility] cardiology    Cancer ([address] [facility])     shoulder/melanoma    Diabetes ([address] [facility]) 2     Essential tremor     Heart attack ([address] [facility])     Hypercholesteremia     Post-menopausal        Past Surgical History:   Procedure Laterality Date    CORONARY ARTERY BYPASS GRAFT N/A 10/21/2022    OPEN STERNUM CABG CORONARY ARTERY BYPASS X 4 WITH LEFT INTERNAL MAMMARY ARTERY,

## 2023-11-16 DIAGNOSIS — D03.59 MELANOMA IN SITU OF TORSO EXCLUDING BREAST (HCC): Primary | ICD-10-CM

## 2024-04-29 ENCOUNTER — OFFICE VISIT (OUTPATIENT)
Dept: NEUROLOGY | Age: 64
End: 2024-04-29

## 2024-04-29 VITALS
OXYGEN SATURATION: 99 % | BODY MASS INDEX: 31.65 KG/M2 | HEIGHT: 65 IN | WEIGHT: 190 LBS | SYSTOLIC BLOOD PRESSURE: 125 MMHG | HEART RATE: 86 BPM | DIASTOLIC BLOOD PRESSURE: 77 MMHG

## 2024-04-29 DIAGNOSIS — G25.0 ESSENTIAL TREMOR: Primary | ICD-10-CM

## 2024-04-29 PROCEDURE — 99213 OFFICE O/P EST LOW 20 MIN: CPT | Performed by: NURSE PRACTITIONER

## 2024-04-29 RX ORDER — LIRAGLUTIDE 6 MG/ML
INJECTION SUBCUTANEOUS
COMMUNITY
Start: 2024-03-12

## 2024-04-29 RX ORDER — PRIMIDONE 250 MG/1
250 TABLET ORAL NIGHTLY
Qty: 120 TABLET | Refills: 2 | Status: SHIPPED | OUTPATIENT
Start: 2024-04-29

## 2024-04-29 RX ORDER — PRIMIDONE 50 MG/1
TABLET ORAL
Qty: 270 TABLET | Refills: 3 | Status: SHIPPED | OUTPATIENT
Start: 2024-04-29

## 2024-04-29 NOTE — PROGRESS NOTES
[facility] Office Note      Patient:   Ana M Beltran  MR#:    [id number]  Account Number:                         YOB: 1960  Date of Evaluation:  4/29/2024  Time of Note:                          1:59 PM  Primary/Referring Physician:  Jessica Tamayo APRN - NP   Consulting Physician:  Sandy Brooks DNP, APRN    FOLLOW UP    Chief Complaint   Patient presents with    Follow-up    Tremors     HISTORY OF PRESENT ILLNESS    Ana M Beltran is a 64 y.o. year old female here for follow up of tremor. She has noted some progression of tremor since last visit. She notes bilateral hand tremor, worse in the left hand. Notes head titubation intermittently. Notes intermittent vocal tremor. Worsens with intention/posture, fine motor tasks. Tremor is beginning to interfere with gymnastics. She is taking Primidone 100/250. Noted sedation with higher dosing during the daytime. No resting tremor. No rigidity or bradykinesia. Denies gait changes. She has failed Propranolol previously. No tremor inducing medications. No stroke history. There is a family history of Parkinson's. No clear family history of essential tremor.     Past Medical History:   Diagnosis Date    Acquired cataract     CAD (coronary artery disease)     hx cabg; sees [facility] cardiology    Cancer ([facility])     shoulder/melanoma    Diabetes ([facility]) 2     Essential tremor     Heart attack ([facility])     Hypercholesteremia     Post-menopausal        Past Surgical History:   Procedure Laterality Date    CORONARY ARTERY BYPASS GRAFT N/A 10/21/2022    OPEN STERNUM CABG CORONARY ARTERY BYPASS X 4 WITH LEFT INTERNAL MAMMARY ARTERY, ENDOSCOPIC VEIN HARVEST WITH PERFUSION.  TRANSESOPHAGEAL ECHOCARDIOGRAM. performed by Alexis Grant MD at [facility] OR    HARDWARE REMOVAL N/A 01/09/2023    STERNAL WIRE HARDWARE REMOVAL performed by Alexis Grant MD at [facility] OR    SHOULDER SURGERY Left 08/17/2023    LEFT SHOULDER EXCISION OF MELANOMA performed by Candace Cole DO

## 2024-08-28 ENCOUNTER — OFFICE VISIT (OUTPATIENT)
Dept: NEUROLOGY | Age: 64
End: 2024-08-28

## 2024-08-28 VITALS
HEIGHT: 65 IN | RESPIRATION RATE: 18 BRPM | HEART RATE: 90 BPM | WEIGHT: 186 LBS | DIASTOLIC BLOOD PRESSURE: 84 MMHG | SYSTOLIC BLOOD PRESSURE: 138 MMHG | BODY MASS INDEX: 30.99 KG/M2

## 2024-08-28 DIAGNOSIS — G25.0 ESSENTIAL TREMOR: Primary | ICD-10-CM

## 2024-08-28 LAB
ALCOHOL URINE: NORMAL
AMPHETAMINE SCREEN URINE: NORMAL
BARBITURATE SCREEN URINE: NORMAL
BENZODIAZEPINE SCREEN, URINE: NORMAL
BUPRENORPHINE URINE: NORMAL
COCAINE METABOLITE SCREEN URINE: NORMAL
FENTANYL SCREEN, URINE: NORMAL
GABAPENTIN SCREEN, URINE: NORMAL
MDMA, URINE: NORMAL
METHADONE SCREEN, URINE: NORMAL
METHAMPHETAMINE, URINE: NORMAL
OPIATE SCREEN URINE: NORMAL
OXYCODONE SCREEN URINE: NORMAL
PHENCYCLIDINE SCREEN URINE: NORMAL
PROPOXYPHENE SCREEN, URINE: NORMAL
SYNTHETIC CANNABINOIDS(K2) SCREEN, URINE: NORMAL
THC SCREEN, URINE: NORMAL
TRAMADOL SCREEN URINE: NORMAL
TRICYCLIC ANTIDEPRESSANTS, UR: NORMAL

## 2024-08-28 PROCEDURE — 80305 DRUG TEST PRSMV DIR OPT OBS: CPT | Performed by: NURSE PRACTITIONER

## 2024-08-28 PROCEDURE — 99213 OFFICE O/P EST LOW 20 MIN: CPT | Performed by: NURSE PRACTITIONER

## 2024-08-28 RX ORDER — CLONAZEPAM 0.5 MG/1
0.5 TABLET ORAL 2 TIMES DAILY
Qty: 60 TABLET | Refills: 2 | Status: CANCELLED | OUTPATIENT
Start: 2024-08-28 | End: 2024-11-26

## 2024-08-28 NOTE — PROGRESS NOTES
REVIEW OF SYSTEMS    Constitutional: []Fever []Sweats []Chills [] Recent Injury [x] Denies all unless marked  HEENT:[]Headache  [] Head Injury/Hearing Loss  [] Sore Throat  [] Ear Ach/Dizziness  [x] Denies all unless marked  Spine:  [] Neck pain  [] Back pain  [] Sciaticia  [x] Denies all unless marked  Psychiatric/Behavioral:[] Depression [] Anxiety [x] Denies all unless marked  Extremities: []Pain  []Swelling  [x] Denies all unless marked  Musculoskeletal: [] Myalgias  [] Joint Pain  [] Arthritis [] Muscle Cramps [] Muscle Twitches  [x] Denies all unless marked  Sleep: []Insomnia[]Snoring []Restless Legs  []Sleep Apnea  []Daytime Sleepiness  [x] Denies all unless marked  Neurological:[]Visual Disturbance/Memory Loss []Loss of Balance []Slurred Speech/Weakness []Seizures  [] Vertigo/Dizziness [x] Denies all unless marked    The MA has completed the ROS with the patient. I have reviewed it in its' entirety with the patient and agree with the documentation.

## 2024-08-28 NOTE — PROGRESS NOTES
REVIEW OF SYSTEMS    Constitutional: []Fever []Sweat []Chills [] Recent Injury [x] Denies all unless marked  HEENT:[]Headache  [] Head Injury/Hearing Loss  [] Sore Throat  [] Ear Ache/Dizziness  [x] Denies all unless marked  Spine:  [] Neck pain  [] Back pain  [] Sciaticia  [x] Denies all unless marked  Cardiovascular:[]Heart Disease []Chest Pain [] Palpitations  [x] Denies all unless marked  Pulmonary: []Shortness of Breath []Cough   [x] Denies all unless marke  Gastrointestinal: []Nausea  []Vomiting  []Abdominal Pain  []Constipation  []Diarrhea  []Dark Bloody Stools  [x] Denies all unless marked  Psychiatric/Behavioral:[] Depression [] Anxiety [x] Denies all unless marked  Genitourinary:   [] Frequency  [] Urgency  [] Incontinence [] Pain with Urination  [x] Denies all unless marked  Extremities: []Pain  []Swelling  [x] Denies all unless marked  Musculoskeletal: [] Muscle Pain  [] Joint Pain  [] Arthritis [] Muscle Cramps [] Muscle Twitches  [x] Denies all unless marked  Sleep: [] Insomnia [] Snoring [] Restless Legs [] Sleep Apnea  [] Daytime Sleepiness  [x] Denies all unless marked  Skin:[] Rash [] Skin Discoloration [x] Denies all unless marked   Neurological: []Visual Disturbance/Memory Loss [] Loss of Balance [] Slurred Speech/Weakness [] Seizures  [] Vertigo/Dizziness [x] Denies all unless marked

## 2024-08-28 NOTE — PROGRESS NOTES
bony deformities  Extremities - No clubbing, cyanosis or edema  Skin - Warm, dry, and intact.  No rash, erythema, or pallor  Psychiatric - Mood, affect, and behavior appear normal      NEUROLOGICAL EXAM     Mental status   [x] Awake, alert, oriented   [x]Affect attention and concentration appear appropriate  [x]Recent and remote memory appears unremarkable  [x]Speech normal without dysarthria or aphasia, comprehension and repetition intact.   COMMENTS:    Cranial Nerves [x]No VF deficit to confrontation,  no papilledema on fundoscopic exam.  [x]PERRLA, EOMI, no nystagmus, conjugate eye movements, no ptosis  [x]Face symmetric  [x]Facial sensation intact  [x]Tongue midline no atrophy or fasciculations present  [x]Palate midline, hearing to finger rub normal bilaterally  [x]Shoulder shrug and SCM testing normal bilaterally  COMMENTS:    Motor   [x]5/5 strength x 4 extremities  [x]Normal bulk and tone  []No tremor present  [x]No rigidity or bradykinesia noted  COMMENTS: positional tremor in BUE, L>R    Sensory  [x]Sensation intact to light touch, pin prick, vibration, and proprioception BLE  [x]Sensation intact to light touch, pin prick, vibration, and proprioception BUE  COMMENTS:   Coordination [x]FTN normal bilaterally   [x]HTS normal bilaterally  [x]ALISSA normal bilaterally.   COMMENTS:   Reflexes  [x]Symmetric and non-pathological  [x]Toes down going bilaterally  [x]No clonus present  COMMENTS:   Gait                  [x]Normal steady gait    []Ataxic    []Spastic     []Magnetic     []Shuffling  COMMENTS:       LABS RECORD AND IMAGING REVIEW (As below and per HPI)    No results found for: \"UMVVORUR88\"  Lab Results   Component Value Date    WBC 4.6 (L) 08/15/2023    HGB 12.8 08/15/2023    HCT 38.2 08/15/2023    MCV 83.6 08/15/2023     08/15/2023     Lab Results   Component Value Date     08/15/2023    K 4.4 08/15/2023     08/15/2023    CO2 23 08/15/2023    BUN 17 08/15/2023    CREATININE 0.6  08/15/2023    GLUCOSE 241 (H) 08/15/2023    CALCIUM 9.5 08/15/2023    BILITOT <0.2 11/29/2022    ALKPHOS 88 11/29/2022    AST 20 11/29/2022    ALT 15 11/29/2022    LABGLOM >60 08/15/2023     Lab Results   Component Value Date    CHOL 222 (H) 10/20/2022    TRIG 168 (H) 10/20/2022    HDL 44 (L) 10/20/2022     Lab Results   Component Value Date    TSH 1.100 10/20/2022    T4FREE 0.3 (L) 11/06/2019     No results found for: \"CRP\", \"SEDRATE\"     Reviewed referral records     ASSESSMENT:    Ana M Beltran is a 64 y.o. year old female here for follow up of tremors.  Has noted worsening tremor over the last several months now.  Exam and history continue to be consistent with essential tremor, could have an anxiety component as well.  Prior MRI brain negative.  On primidone 150/250, has had some sedation with this dosing previously.  Has failed propranolol in the past.  Will plan to add Klonopin today given worsening tremor.  Her urine drug screen did come back abnormal, sending off for confirmation.  Reviewed her Fidel and it was unremarkable.       ICD-10-CM    1. Essential tremor  G25.0         PLAN:  1. Continue Primidone 150/250.   2. Add Klonopin 0.5 BID. Titrate further as needed, patient will call. Will need to wait for drug screen confirmation before sending this to the pharmacy.    3. Avoid caffeine, manage stress/anxiety in the setting of essential tremor   4. UDS today. The prescription monitoring report was reviewed today. Possible medication side effects, risk of tolerance/dependence & alternative treatments discussed. No signs of potential drug abuse or diversion identified.    5.  Return in about 3 months (around 11/28/2024) for follow up, sooner if worsening.    Sandy Brooks DNP, APRN    This dictation was generated by voice recognition computer software.  Although all attempts are made to edit the dictation for accuracy, there may be errors in the transcription that are not intended.

## 2024-09-03 ENCOUNTER — TELEPHONE (OUTPATIENT)
Dept: NEUROLOGY | Age: 64
End: 2024-09-03

## 2024-09-03 DIAGNOSIS — G25.0 ESSENTIAL TREMOR: ICD-10-CM

## 2024-09-03 RX ORDER — PRIMIDONE 250 MG/1
250 TABLET ORAL NIGHTLY
Qty: 120 TABLET | Refills: 2 | Status: SHIPPED | OUTPATIENT
Start: 2024-09-03

## 2024-09-03 RX ORDER — PRIMIDONE 50 MG/1
TABLET ORAL
Qty: 270 TABLET | Refills: 3 | Status: SHIPPED | OUTPATIENT
Start: 2024-09-03

## 2024-09-03 NOTE — TELEPHONE ENCOUNTER
Requested Prescriptions     Pending Prescriptions Disp Refills    primidone (MYSOLINE) 250 MG tablet 120 tablet 2     Sig: Take 1 tablet by mouth nightly    primidone (MYSOLINE) 50 MG tablet 270 tablet 3     Sig: Take 3 tablets in the morning       Last Office Visit: 8/28/2024  Next Office Visit: Visit date not found  Last Medication Refill: 04/29/2024

## 2024-09-03 NOTE — TELEPHONE ENCOUNTER
Patient called about her prescription at [facility] in [place]. Patient requesting prescription to be called in (did not specify what medication). Patient requesting a call back.

## 2024-09-05 DIAGNOSIS — R25.1 TREMOR: Primary | ICD-10-CM

## 2024-09-05 RX ORDER — CLONAZEPAM 0.5 MG/1
0.5 TABLET ORAL 2 TIMES DAILY
Qty: 60 TABLET | Refills: 1 | Status: SHIPPED | OUTPATIENT
Start: 2024-09-05 | End: 2024-11-04

## 2025-02-03 DIAGNOSIS — G25.0 ESSENTIAL TREMOR: ICD-10-CM

## 2025-02-03 DIAGNOSIS — R25.1 TREMOR: ICD-10-CM

## 2025-02-03 RX ORDER — PRIMIDONE 250 MG/1
250 TABLET ORAL NIGHTLY
Qty: 120 TABLET | Refills: 2 | Status: SHIPPED | OUTPATIENT
Start: 2025-02-03

## 2025-02-03 RX ORDER — PRIMIDONE 50 MG/1
TABLET ORAL
Qty: 270 TABLET | Refills: 3 | Status: SHIPPED | OUTPATIENT
Start: 2025-02-03

## 2025-02-03 NOTE — TELEPHONE ENCOUNTER
Requested Prescriptions     Pending Prescriptions Disp Refills    primidone (MYSOLINE) 50 MG tablet 270 tablet 3     Sig: Take 3 tablets in the morning    primidone (MYSOLINE) 250 MG tablet 120 tablet 2     Sig: Take 1 tablet by mouth nightly       Last Office Visit:8/28/2024  Next Office Visit: 4/17/2025   Last Medication Refill:9/3/2024 with 2 RF

## 2025-02-27 ENCOUNTER — TELEPHONE (OUTPATIENT)
Dept: NEUROLOGY | Age: 65
End: 2025-02-27

## 2025-02-27 NOTE — TELEPHONE ENCOUNTER
Patient is wanting to discuss coming off her Primidone to do a 3 part study. I advised patient that she had an appointment that was scheduled in April to be seen in April with Sandy Brooks that she would need to discuss this with her at this time. She still wanted me to let her know about this study, if she needed to wait til for her appt to come off her medication she would wait if needed  Please advise

## 2025-04-17 ENCOUNTER — OFFICE VISIT (OUTPATIENT)
Dept: NEUROLOGY | Age: 65
End: 2025-04-17

## 2025-04-17 VITALS
HEIGHT: 65 IN | BODY MASS INDEX: 30.16 KG/M2 | OXYGEN SATURATION: 96 % | DIASTOLIC BLOOD PRESSURE: 85 MMHG | SYSTOLIC BLOOD PRESSURE: 146 MMHG | HEART RATE: 90 BPM | WEIGHT: 181 LBS

## 2025-04-17 DIAGNOSIS — G25.0 ESSENTIAL TREMOR: Primary | ICD-10-CM

## 2025-04-17 PROCEDURE — 99213 OFFICE O/P EST LOW 20 MIN: CPT | Performed by: NURSE PRACTITIONER

## 2025-04-17 NOTE — PROGRESS NOTES
[facility] Office Note      Patient:   Ana M Beltran  MR#:    [id number]  Account Number:                         YOB: 1960  Date of Evaluation:  4/17/2025  Time of Note:                          11:22 AM  Primary/Referring Physician:  Jessica Tamayo APRN - NP   Consulting Physician:  Sandy Brooks DNP, ROB    FOLLOW UP    Chief Complaint   Patient presents with    Follow-up    Tremors     C/o worsening tremors after stopping for clinical trial      HISTORY OF PRESENT ILLNESS    Ana M Beltran is a 65 y.o. year old female here for follow up of essential tremor. She is now off Primidone due to being enrolled in a research trial for a new drug for ET. Has noted worsening tremor related to this. She notes bilateral hand tremor, worse in the left hand. Notes head titubation intermittently. Notes intermittent vocal tremor. Worsens with intention/posture, fine motor tasks. Tremor is interfering with ADLs. Having more difficulty with eating, drinking, putting on make up. No resting tremor. No rigidity or bradykinesia. Denies gait changes. She has been on Primidone in the past with benefit. She has been on Propranolol in the past as well. No tremor inducing medications. No stroke history. There is a family history of Parkinson's. No clear family history of essential tremor.     Past Medical History:   Diagnosis Date    Acquired cataract     CAD (coronary artery disease)     hx cabg; sees [facility] cardiology    Cancer ([facility])     shoulder/melanoma    Diabetes ([facility]) 2     Essential tremor     Heart attack ([facility])     Hypercholesteremia     Post-menopausal        Past Surgical History:   Procedure Laterality Date    CORONARY ARTERY BYPASS GRAFT N/A 10/21/2022    OPEN STERNUM CABG CORONARY ARTERY BYPASS X 4 WITH LEFT INTERNAL MAMMARY ARTERY, ENDOSCOPIC VEIN HARVEST WITH PERFUSION.  TRANSESOPHAGEAL ECHOCARDIOGRAM. performed by Alexis Grant MD at [facility] OR    HARDWARE REMOVAL N/A 01/09/2023    STERNAL

## (undated) DEVICE — DRAPE,UTILITY,XL,4/PK,STERILE: Brand: MEDLINE

## (undated) DEVICE — SUTURE VCRL SZ 3-0 L27IN ABSRB UD L26MM SH 1/2 CIR J416H

## (undated) DEVICE — GLOVE SURG SZ 7 CRM LTX FREE POLYISOPRENE POLYMER BEAD ANTI

## (undated) DEVICE — SUTURE MCRYL SZ 4-0 L18IN ABSRB UD L19MM PS-2 3/8 CIR PRIM Y496G

## (undated) DEVICE — LARYNGOSCOPE BLDE MAC HNDL M SZ 35 ST CURAPLEX CURAVIEW LED

## (undated) DEVICE — DRAIN SURG SGL COLL PT TB FOR ATS BG OASIS

## (undated) DEVICE — COVER TRANSDUCER TELESCOPICALLY FOLDED 3.5X36 IN CIV-FLEX

## (undated) DEVICE — CONTAINER,SPECIMEN,OR STERILE,4OZ: Brand: MEDLINE

## (undated) DEVICE — DRESSING FOAM W4XL12IN SIL RECT ADH WTRPRF FLM BK W/ BORD

## (undated) DEVICE — BLANKET THER AD W24XL60IN FAB COVERING SUP SFT ULT THN LTWT

## (undated) DEVICE — SUTURE PERMAHAND SZ 0 L30IN NONABSORBABLE BLK SILK BRAID A306H

## (undated) DEVICE — GLOVE SURG SZ 75 L12IN FNGR THK79MIL GRN LTX FREE

## (undated) DEVICE — NEPTUNE E-SEP SMOKE EVACUATION PENCIL, COATED, 70MM BLADE, ROCKER SWITCH: Brand: NEPTUNE E-SEP

## (undated) DEVICE — SUTURE PERMAHAND SZ 2-0 L18IN NONABSORBABLE BLK L26MM FS 685G

## (undated) DEVICE — JP CHANNEL DRAIN, 24FR HUBLESS: Brand: CARDINAL HEALTH

## (undated) DEVICE — VESSEL HARVESTING KIT 7 MM ENDOSCP FOR PWR SUPL

## (undated) DEVICE — E-Z CLEAN, NON-STICK, PTFE COATED, ELECTROSURGICAL BLADE ELECTRODE, MODIFIED EXTENDED INSULATION, 4 INCH (10.2 CM): Brand: MEGADYNE

## (undated) DEVICE — SUTURE PROL SZ 7-0 L24IN NONABSORBABLE BLU L9.3MM BV-1 3/8 M8702

## (undated) DEVICE — GLOVE SURG SZ 65 CRM LTX FREE POLYISOPRENE POLYMER BEAD ANTI

## (undated) DEVICE — SOLUTION IV 500ML 0.9% SOD CHL PH 5 INJ USP VIAFLX PLAS

## (undated) DEVICE — SUTURE SZ 7 L18IN NONABSORBABLE SIL CCS L48MM 1/2 CIR STRNM M655G

## (undated) DEVICE — OPTIFOAM GENTLE SA, POSTOP, 4X12: Brand: MEDLINE

## (undated) DEVICE — PACK,UNIVERSAL,NO GOWNS: Brand: MEDLINE

## (undated) DEVICE — SUTURE PERMAHAND SZ 4-0 L12X30IN NONABSORBABLE BLK SILK A303H

## (undated) DEVICE — CLIP INT SM TI EZ LD LIG SYS WECK HORZ

## (undated) DEVICE — STERNUM BLADE, OFFSET (32.0 X 0.8 X 6.3MM)

## (undated) DEVICE — CLIP LIG M BLU TI HRT SHP WIRE HORZ 180 PER BX

## (undated) DEVICE — KIT CATH 18GA L6IN FEM ART LN W/ INTEGR SUT WNG 0.25X17

## (undated) DEVICE — SUTURE PERMAHAND SZ 2-0 L30IN NONABSORBABLE BLK SH L26MM C016D

## (undated) DEVICE — ADHESIVE SKIN CLOSURE WND 8.661X1.5 IN 22 CM LIQUIBAND SECUR

## (undated) DEVICE — SUTURE PERMAHAND SZ 3-0 L30IN NONABSORBABLE BLK SILK BRAID A304H

## (undated) DEVICE — SET CATH 20GA L1.75IN RAD ART POLYUR RADPQ W/ INTEGR

## (undated) DEVICE — SOLUTION IV 1000ML 140MEQ/L SOD 5MEQ/L K 3MEQ/L MG 27MEQ/L

## (undated) DEVICE — 3M™ STERI-STRIP™ REINFORCED ADHESIVE SKIN CLOSURES, R1546, 1/4 IN X 4 IN (6 MM X 100 MM), 10 STRIPS/ENVELOPE: Brand: 3M™ STERI-STRIP™

## (undated) DEVICE — VITAL SIGNS™ ADULT FACE MASK WITH ADJUSTABLE AIR CUSHION - SIZE 5: Brand: VITAL SIGNS™

## (undated) DEVICE — COVER,TABLE,44X90,STERILE: Brand: MEDLINE

## (undated) DEVICE — KIT INTRO 8.5FR L4IN PERC POLYUR SHTH RADPQ W/ INTEGR

## (undated) DEVICE — SUTURE ABSORBABLE MONOFILAMENT 0 CTX 36 IN VIO PDS + PDP370T

## (undated) DEVICE — BLADE OPHTH 180DEG CUT SURF BLU STR SHRP DBL BVL GRINDLESS

## (undated) DEVICE — GLOVE SURG SZ 7 L12IN FNGR THK79MIL GRN LTX FREE

## (undated) DEVICE — SUTURE NONABSORBABLE MONOFILAMENT 7-0 BV-1 1X24 IN PROLENE 8702H

## (undated) DEVICE — ADHESIVE LIQ MED 2/3 CC VI DEV REL INJ LO RISK MASTISOL

## (undated) DEVICE — DRESSING MEPILEX BORDER FLEX LITE 4X5CM

## (undated) DEVICE — AEGIS 1" DISK 4MM HOLE, PEEL OPEN: Brand: MEDLINE

## (undated) DEVICE — YANKAUER,TAPERED BULBOUS TIP,W/O VENT: Brand: MEDLINE

## (undated) DEVICE — AGENT HEMSTAT 8ML FLX TIP MTRX + DISP SURGIFLO

## (undated) DEVICE — SOLUTION IV IRRIG WATER 1000ML POUR BRL 2F7114

## (undated) DEVICE — SET IV LVP AS 10 DROP 4 SMARTSITES 4-WAY STOPCOCK

## (undated) DEVICE — SOLUTION CARDPLG H2O DIL 1000 ML CARD PERF VIAFLX

## (undated) DEVICE — KIT BLWR MISTER 5P 15L W/ TBNG SET IRRIG MIST TO IMPROVE

## (undated) DEVICE — SOLUTION IV 1000ML 0.9% SOD CHL PH 5 INJ USP VIAFLX PLAS

## (undated) DEVICE — SUTURE VCRL SZ 2-0 L36IN ABSRB UD L36MM CT-1 1/2 CIR J945H

## (undated) DEVICE — SUTURE S STL SZ 5 L18IN NONABSORBABLE SIL CCS L48MM 1/2 CIR M653G

## (undated) DEVICE — GLOVE SURG SZ 65 L12IN FNGR THK79MIL GRN LTX FREE

## (undated) DEVICE — DRAIN SURG 19FR SIL RND HUBLESS W/ 0.25IN BEND TRCR BLAK

## (undated) DEVICE — SUTURE ETHBND EXCEL SZ 2-0 L36IN NONABSORBABLE GRN L26MM SH X523H

## (undated) DEVICE — SUTURE MNCRYL 0 UNDYED CT1 Y496H

## (undated) DEVICE — 12 FOOT DISPOSABLE EXTENSION CABLE WITH SAFE CONNECT / SCREW-DOWN

## (undated) DEVICE — GLOVE SURG SZ 7 L12IN FNGR THK94MIL TRNSLUC YEL LTX HYDRGEL

## (undated) DEVICE — SUTURE PROL SZ 6-0 L30IN NONABSORBABLE BLU L13MM RB-2 1/2 8711H

## (undated) DEVICE — ACCESSORY TOWEL PACK: Brand: MEDLINE INDUSTRIES, INC.

## (undated) DEVICE — MINOR CDS: Brand: MEDLINE INDUSTRIES, INC.

## (undated) DEVICE — CATHETER THRMDIL 7.5FR L110CM PROXIMAL/DISTAL PRT L30CM STD

## (undated) DEVICE — BLADE, TONGUE, 6", STERILE: Brand: MEDLINE

## (undated) DEVICE — SUTURE PROL SZ 3-0 L36IN NONABSORBABLE BLU L26MM SH 1/2 CIR 8522H

## (undated) DEVICE — SUTURE PROL SZ 4-0 L36IN NONABSORBABLE BLU L17MM RB-1 1/2 M8557

## (undated) DEVICE — ROYAL SILK SURGICAL GOWN, XXL: Brand: CONVERTORS

## (undated) DEVICE — SUTURE PERMAHAND SZ 2-0 L30IN NONABSORBABLE BLK SILK W/O A305H

## (undated) DEVICE — LIQUIBAND RAPID ADHESIVE 36/CS 0.8ML: Brand: MEDLINE

## (undated) DEVICE — AGENT HEMSTAT W4XL4IN OXIDIZED REGENERATED CELOS STRUCTURED

## (undated) DEVICE — SOLUTION IV IRRIG POUR BRL 0.9% SODIUM CHL 2F7124

## (undated) DEVICE — SHEET,DRAPE,53X77,STERILE: Brand: MEDLINE

## (undated) DEVICE — AORTIC PUNCHES ARE USED TO CREATE A UNIFORM OPENING IN BLOOD VESSELS DURING CARDIOVASCULAR SURGERY. THE VESSEL GRAFT IS INSERTED INTO THE CREATED OPENING AND SUTURED TO THE VESSEL WALL. AORTIC LANCETS ARE USED TO MAKE A SMALL UNIFORM CUT IN A BLOOD VESSEL TO FACILITATE INSERTION OF AN AORTIC PUNCH.  PUNCHES COME IN VARIOUS LENGTHS, DIAMETERS AND TIP CONFIGURATIONS.: Brand: CLEANCUT ROTATING AORTIC PUNCH

## (undated) DEVICE — STRIP,CLOSURE,WOUND,MEDI-STRIP,1/2X4: Brand: MEDLINE

## (undated) DEVICE — SURGICAL PROCEDURE PACK OPN HRT LOURDES HOSP

## (undated) DEVICE — SUTURE VCRL SZ 4-0 L27IN ABSRB UD L19MM PS-2 3/8 CIR PRIM J426H

## (undated) DEVICE — TUBE ET 7.5MM NSL ORAL BASIC CUF INTMED MURPHY EYE RADPQ

## (undated) DEVICE — GLOVE SURG SZ 6 L12IN FNGR THK94MIL TRNSLUC YEL LTX HYDRGEL

## (undated) DEVICE — STRIP SKIN CLSR W0.25XL4IN WHT SPUNBOUND FBR NYL HI ADH

## (undated) DEVICE — SPONGE LAP W18XL18IN WHT COT 4 PLY FLD STRUNG RADPQ DISP ST

## (undated) DEVICE — SOLUTION IV 250ML 0.9% SOD CHL PH 5 INJ USP VIAFLX PLAS

## (undated) DEVICE — LEAD PACE L475MM CHNL A OR V MYOCARDIAL STEROID ELUT SIL

## (undated) DEVICE — SUTURE N ABSRB BRAIDED 2-0 RB1 30 IN COAT WHT ETHBND EXCEL MX523

## (undated) DEVICE — 3M™ TEGADERM™ TRANSPARENT FILM DRESSING FRAME STYLE, 1628, 6 IN X 8 IN (15 CM X 20 CM), 10/CT 8CT/CASE: Brand: 3M™ TEGADERM™

## (undated) DEVICE — DRAIN SURG L3/8-1/2IN DIA3/16IN SIL CARD CONN 1:1 BLAK

## (undated) DEVICE — TOWEL,OR,DSP,ST,BLUE,DLX,4/PK,20PK/CS: Brand: MEDLINE

## (undated) DEVICE — DRESSING MEPILEX BORDER FLEX LITE 5X12.5CM

## (undated) DEVICE — POSITIONER SURG VAC ACROBAT-I

## (undated) DEVICE — CHLORAPREP 26ML ORANGE

## (undated) DEVICE — DEVICE RESUS AD TB L40IN SELF INFL MASK TEXT BG DBL SWVL EL

## (undated) DEVICE — DRESSING FOAM 0.75IN 1.5MM H DISK CHG IMPREG AEGIS

## (undated) DEVICE — CONNECTOR DRNGE W3/8-0.5XH3/16XL3/16IN 2:1 SIL CARD STR

## (undated) DEVICE — Device: Brand: SADDLELOOP 18G X 10CM WITH BLUNT NEEDLE

## (undated) DEVICE — GOWN,PREVENTION PLUS,XL,ST,24/CS: Brand: MEDLINE

## (undated) DEVICE — STABILIZER SURG VAC STD BLADE POD MALL FT ACROBATI